# Patient Record
Sex: FEMALE | Race: WHITE | Employment: FULL TIME | URBAN - METROPOLITAN AREA
[De-identification: names, ages, dates, MRNs, and addresses within clinical notes are randomized per-mention and may not be internally consistent; named-entity substitution may affect disease eponyms.]

---

## 2017-01-25 ENCOUNTER — HOSPITAL ENCOUNTER (EMERGENCY)
Age: 57
Discharge: HOME | End: 2017-01-25
Attending: EMERGENCY MEDICINE
Payer: COMMERCIAL

## 2017-01-25 ENCOUNTER — APPOINTMENT (OUTPATIENT)
Dept: CT IMAGING | Age: 57
End: 2017-01-25
Attending: EMERGENCY MEDICINE
Payer: COMMERCIAL

## 2017-01-25 VITALS — DIASTOLIC BLOOD PRESSURE: 74 MMHG | SYSTOLIC BLOOD PRESSURE: 122 MMHG | OXYGEN SATURATION: 100 %

## 2017-01-25 DIAGNOSIS — R10.31 ABDOMINAL PAIN, RIGHT LOWER QUADRANT: Primary | ICD-10-CM

## 2017-01-25 LAB
ALBUMIN SERPL BCP-MCNC: 3.7 G/DL (ref 3.5–5)
ALBUMIN/GLOB SERPL: 1.1 {RATIO} (ref 1.1–2.2)
ALP SERPL-CCNC: 67 U/L (ref 45–117)
ALT SERPL-CCNC: 23 U/L (ref 12–78)
ANION GAP BLD CALC-SCNC: 8 MMOL/L (ref 5–15)
APPEARANCE UR: ABNORMAL
AST SERPL W P-5'-P-CCNC: 21 U/L (ref 15–37)
BACTERIA URNS QL MICRO: NEGATIVE /HPF
BASOPHILS # BLD AUTO: 0 K/UL (ref 0–0.1)
BASOPHILS # BLD: 0 % (ref 0–1)
BILIRUB SERPL-MCNC: 0.6 MG/DL (ref 0.2–1)
BILIRUB UR QL: NEGATIVE
BUN SERPL-MCNC: 16 MG/DL (ref 6–20)
BUN/CREAT SERPL: 30 (ref 12–20)
CALCIUM SERPL-MCNC: 8.9 MG/DL (ref 8.5–10.1)
CHLORIDE SERPL-SCNC: 104 MMOL/L (ref 97–108)
CO2 SERPL-SCNC: 27 MMOL/L (ref 21–32)
COLOR UR: ABNORMAL
CREAT SERPL-MCNC: 0.54 MG/DL (ref 0.55–1.02)
EOSINOPHIL # BLD: 0.2 K/UL (ref 0–0.4)
EOSINOPHIL NFR BLD: 2 % (ref 0–7)
EPITH CASTS URNS QL MICRO: ABNORMAL /LPF
ERYTHROCYTE [DISTWIDTH] IN BLOOD BY AUTOMATED COUNT: 13.5 % (ref 11.5–14.5)
GLOBULIN SER CALC-MCNC: 3.5 G/DL (ref 2–4)
GLUCOSE SERPL-MCNC: 92 MG/DL (ref 65–100)
GLUCOSE UR STRIP.AUTO-MCNC: NEGATIVE MG/DL
HCG UR QL: NEGATIVE
HCT VFR BLD AUTO: 44.8 % (ref 35–47)
HGB BLD-MCNC: 15 G/DL (ref 11.5–16)
HGB UR QL STRIP: NEGATIVE
KETONES UR QL STRIP.AUTO: NEGATIVE MG/DL
LEUKOCYTE ESTERASE UR QL STRIP.AUTO: NEGATIVE
LYMPHOCYTES # BLD AUTO: 15 % (ref 12–49)
LYMPHOCYTES # BLD: 1.4 K/UL (ref 0.8–3.5)
MCH RBC QN AUTO: 33.5 PG (ref 26–34)
MCHC RBC AUTO-ENTMCNC: 33.5 G/DL (ref 30–36.5)
MCV RBC AUTO: 100 FL (ref 80–99)
MONOCYTES # BLD: 0.6 K/UL (ref 0–1)
MONOCYTES NFR BLD AUTO: 6 % (ref 5–13)
MUCOUS THREADS URNS QL MICRO: ABNORMAL /LPF
NEUTS SEG # BLD: 7.3 K/UL (ref 1.8–8)
NEUTS SEG NFR BLD AUTO: 77 % (ref 32–75)
NITRITE UR QL STRIP.AUTO: NEGATIVE
PH UR STRIP: 6 [PH] (ref 5–8)
PLATELET # BLD AUTO: 287 K/UL (ref 150–400)
POTASSIUM SERPL-SCNC: 3.8 MMOL/L (ref 3.5–5.1)
PROT SERPL-MCNC: 7.2 G/DL (ref 6.4–8.2)
PROT UR STRIP-MCNC: NEGATIVE MG/DL
RBC # BLD AUTO: 4.48 M/UL (ref 3.8–5.2)
RBC #/AREA URNS HPF: ABNORMAL /HPF (ref 0–5)
SODIUM SERPL-SCNC: 139 MMOL/L (ref 136–145)
SP GR UR REFRACTOMETRY: 1.03 (ref 1–1.03)
UROBILINOGEN UR QL STRIP.AUTO: 0.2 EU/DL (ref 0.2–1)
WBC # BLD AUTO: 9.5 K/UL (ref 3.6–11)
WBC URNS QL MICRO: ABNORMAL /HPF (ref 0–4)

## 2017-01-25 PROCEDURE — 81025 URINE PREGNANCY TEST: CPT

## 2017-01-25 PROCEDURE — 36415 COLL VENOUS BLD VENIPUNCTURE: CPT | Performed by: EMERGENCY MEDICINE

## 2017-01-25 PROCEDURE — 99284 EMERGENCY DEPT VISIT MOD MDM: CPT

## 2017-01-25 PROCEDURE — 74011250636 HC RX REV CODE- 250/636: Performed by: EMERGENCY MEDICINE

## 2017-01-25 PROCEDURE — 80053 COMPREHEN METABOLIC PANEL: CPT | Performed by: EMERGENCY MEDICINE

## 2017-01-25 PROCEDURE — 85025 COMPLETE CBC W/AUTO DIFF WBC: CPT | Performed by: EMERGENCY MEDICINE

## 2017-01-25 PROCEDURE — 96375 TX/PRO/DX INJ NEW DRUG ADDON: CPT

## 2017-01-25 PROCEDURE — 74176 CT ABD & PELVIS W/O CONTRAST: CPT

## 2017-01-25 PROCEDURE — 96374 THER/PROPH/DIAG INJ IV PUSH: CPT

## 2017-01-25 PROCEDURE — 81001 URINALYSIS AUTO W/SCOPE: CPT | Performed by: EMERGENCY MEDICINE

## 2017-01-25 RX ORDER — KETOROLAC TROMETHAMINE 30 MG/ML
30 INJECTION, SOLUTION INTRAMUSCULAR; INTRAVENOUS
Status: COMPLETED | OUTPATIENT
Start: 2017-01-25 | End: 2017-01-25

## 2017-01-25 RX ORDER — SODIUM CHLORIDE 0.9 % (FLUSH) 0.9 %
5-10 SYRINGE (ML) INJECTION AS NEEDED
Status: DISCONTINUED | OUTPATIENT
Start: 2017-01-25 | End: 2017-01-25 | Stop reason: HOSPADM

## 2017-01-25 RX ORDER — OXYCODONE AND ACETAMINOPHEN 5; 325 MG/1; MG/1
1 TABLET ORAL
Qty: 20 TAB | Refills: 0 | Status: SHIPPED | OUTPATIENT
Start: 2017-01-25 | End: 2017-02-02 | Stop reason: ALTCHOICE

## 2017-01-25 RX ORDER — MORPHINE SULFATE 4 MG/ML
4 INJECTION, SOLUTION INTRAMUSCULAR; INTRAVENOUS
Status: COMPLETED | OUTPATIENT
Start: 2017-01-25 | End: 2017-01-25

## 2017-01-25 RX ORDER — SODIUM CHLORIDE 0.9 % (FLUSH) 0.9 %
5-10 SYRINGE (ML) INJECTION EVERY 8 HOURS
Status: DISCONTINUED | OUTPATIENT
Start: 2017-01-25 | End: 2017-01-25 | Stop reason: HOSPADM

## 2017-01-25 RX ADMIN — SODIUM CHLORIDE 1000 ML: 900 INJECTION, SOLUTION INTRAVENOUS at 10:16

## 2017-01-25 RX ADMIN — MORPHINE SULFATE 4 MG: 4 INJECTION, SOLUTION INTRAMUSCULAR; INTRAVENOUS at 11:20

## 2017-01-25 RX ADMIN — KETOROLAC TROMETHAMINE 30 MG: 30 INJECTION, SOLUTION INTRAMUSCULAR at 10:16

## 2017-01-25 NOTE — ED PROVIDER NOTES
HPI Comments: Miles Farrell is a 64 y.o. female who presents ambulatory to the ED with cc of constant, right lowre abdominal pain that progressively worsened since onset last night. Pt states that last night she experienced acute onset right, lower back pain which she believed was due to a pulled muscle, however, she states pain has since radiated and localized within the right lower abdomen. She reports exacerbation of pain with sitting upright, noting pain is alleviated while laying flat or when standing up straight. She reports use of OTC Advil around 0600 this morning with relief of pain. She endorses normal bowel movements. She reports additional symptom of nausea and lightheadedness secondary to exacerbation of pain. Her PSHx is significant for appendectomy. She specifically denies any dysuria, hematuria, fevers, chills, vomiting, chest pain, shortness of breath, headache, rash, diarrhea, sweating or weight loss. PCP: Charles Juarez MD    There are no other complaints, changes or physical findings at this time. The history is provided by the patient. No  was used. No past medical history on file. Past Surgical History:   Procedure Laterality Date    Hx gyn       dysplasia         Family History:   Problem Relation Age of Onset    Heart Disease Mother        Social History     Social History    Marital status: SINGLE     Spouse name: N/A    Number of children: N/A    Years of education: N/A     Occupational History    Not on file. Social History Main Topics    Smoking status: Current Every Day Smoker     Packs/day: 0.50     Years: 30.00    Smokeless tobacco: Not on file    Alcohol use Yes      Comment: weekends    Drug use: Not on file    Sexual activity: Not on file     Other Topics Concern    Not on file     Social History Narrative         ALLERGIES: Review of patient's allergies indicates no known allergies.     Review of Systems   Constitutional: Negative. Negative for activity change, appetite change, chills, diaphoresis, fatigue, fever and unexpected weight change. HENT: Negative. Negative for congestion, hearing loss, rhinorrhea, sneezing and voice change. Eyes: Negative. Negative for pain and visual disturbance. Respiratory: Negative. Negative for apnea, cough, choking, chest tightness and shortness of breath. Cardiovascular: Negative. Negative for chest pain and palpitations. Gastrointestinal: Positive for abdominal pain and nausea. Negative for abdominal distention, blood in stool, diarrhea and vomiting. Genitourinary: Negative. Negative for difficulty urinating, dysuria, flank pain, frequency, hematuria and urgency. No discharge   Musculoskeletal: Negative. Negative for arthralgias, back pain, myalgias and neck stiffness. Skin: Negative. Negative for color change and rash. Neurological: Positive for light-headedness. Negative for dizziness, seizures, syncope, speech difficulty, weakness, numbness and headaches. Hematological: Negative for adenopathy. Psychiatric/Behavioral: Negative. Negative for agitation, behavioral problems, dysphoric mood and suicidal ideas. The patient is not nervous/anxious. Vitals:    01/25/17 1115 01/25/17 1130 01/25/17 1145 01/25/17 1146   BP: 126/77 121/76 127/77 122/74   SpO2: 99% 100% 100% 100%            Physical Exam   Constitutional: She is oriented to person, place, and time. She appears well-developed and well-nourished. No distress. HENT:   Head: Normocephalic and atraumatic. Mouth/Throat: Oropharynx is clear and moist. No oropharyngeal exudate. Eyes: Conjunctivae and EOM are normal. Pupils are equal, round, and reactive to light. Right eye exhibits no discharge. Left eye exhibits no discharge. Neck: Normal range of motion. Neck supple. Cardiovascular: Normal rate, regular rhythm and intact distal pulses. Exam reveals no gallop and no friction rub.     No murmur heard.  Pulmonary/Chest: Effort normal and breath sounds normal. No respiratory distress. She has no wheezes. She has no rales. She exhibits no tenderness. Abdominal: Soft. Bowel sounds are normal. She exhibits no distension and no mass. There is no tenderness. There is no rebound and no guarding. Musculoskeletal: Normal range of motion. She exhibits no edema. Lymphadenopathy:     She has no cervical adenopathy. Neurological: She is alert and oriented to person, place, and time. No cranial nerve deficit. Coordination normal.   Skin: Skin is warm and dry. No rash noted. No erythema. Psychiatric: She has a normal mood and affect. Nursing note and vitals reviewed. MDM  Number of Diagnoses or Management Options  Abdominal pain, right lower quadrant:   Diagnosis management comments: DDx: Diverticulitis, SBO, kidney stone, UTI       Amount and/or Complexity of Data Reviewed  Clinical lab tests: ordered and reviewed  Tests in the radiology section of CPT®: ordered and reviewed  Review and summarize past medical records: yes  Independent visualization of images, tracings, or specimens: yes    Patient Progress  Patient progress: stable        Procedures      Chief Complaint   Patient presents with    Abdominal Pain       9:41 AM  The patients presenting problems have been discussed, and they are in agreement with the care plan formulated and outlined with them. I have encouraged them to ask questions as they arise throughout their visit.     MEDICATIONS GIVEN:  Medications   ketorolac (TORADOL) injection 30 mg (30 mg IntraVENous Given 1/25/17 1016)   sodium chloride 0.9 % bolus infusion 1,000 mL (0 mL IntraVENous IV Completed 1/25/17 1205)   morphine injection 4 mg (4 mg IntraVENous Given 1/25/17 1120)       LABS REVIEWED:  Recent Results (from the past 24 hour(s))   METABOLIC PANEL, COMPREHENSIVE    Collection Time: 01/30/17 11:35 AM   Result Value Ref Range    Sodium 137 136 - 145 mmol/L    Potassium 4.1 3.5 - 5.1 mmol/L    Chloride 101 97 - 108 mmol/L    CO2 28 21 - 32 mmol/L    Anion gap 8 5 - 15 mmol/L    Glucose 88 65 - 100 mg/dL    BUN 10 6 - 20 MG/DL    Creatinine 0.53 (L) 0.55 - 1.02 MG/DL    BUN/Creatinine ratio 19 12 - 20      GFR est AA >60 >60 ml/min/1.73m2    GFR est non-AA >60 >60 ml/min/1.73m2    Calcium 8.7 8.5 - 10.1 MG/DL    Bilirubin, total 0.4 0.2 - 1.0 MG/DL    ALT 28 12 - 78 U/L    AST 19 15 - 37 U/L    Alk. phosphatase 54 45 - 117 U/L    Protein, total 6.5 6.4 - 8.2 g/dL    Albumin 3.4 (L) 3.5 - 5.0 g/dL    Globulin 3.1 2.0 - 4.0 g/dL    A-G Ratio 1.1 1.1 - 2.2     CBC WITH AUTOMATED DIFF    Collection Time: 01/30/17 11:35 AM   Result Value Ref Range    WBC 6.8 3.6 - 11.0 K/uL    RBC 4.54 3.80 - 5.20 M/uL    HGB 15.2 11.5 - 16.0 g/dL    HCT 44.0 35.0 - 47.0 %    MCV 96.9 80.0 - 99.0 FL    MCH 33.5 26.0 - 34.0 PG    MCHC 34.5 30.0 - 36.5 g/dL    RDW 12.9 11.5 - 14.5 %    PLATELET 471 971 - 867 K/uL    NEUTROPHILS 60 32 - 75 %    LYMPHOCYTES 28 12 - 49 %    MONOCYTES 8 5 - 13 %    EOSINOPHILS 4 0 - 7 %    BASOPHILS 0 0 - 1 %    ABS. NEUTROPHILS 4.1 1.8 - 8.0 K/UL    ABS. LYMPHOCYTES 1.9 0.8 - 3.5 K/UL    ABS. MONOCYTES 0.6 0.0 - 1.0 K/UL    ABS. EOSINOPHILS 0.2 0.0 - 0.4 K/UL    ABS.  BASOPHILS 0.0 0.0 - 0.1 K/UL   URINALYSIS W/ REFLEX CULTURE    Collection Time: 01/30/17 11:35 AM   Result Value Ref Range    Color YELLOW/STRAW      Appearance CLEAR CLEAR      Specific gravity 1.009 1.003 - 1.030      pH (UA) 7.5 5.0 - 8.0      Protein NEGATIVE  NEG mg/dL    Glucose NEGATIVE  NEG mg/dL    Ketone TRACE (A) NEG mg/dL    Bilirubin NEGATIVE  NEG      Blood NEGATIVE  NEG      Urobilinogen 0.2 0.2 - 1.0 EU/dL    Nitrites NEGATIVE  NEG      Leukocyte Esterase NEGATIVE  NEG      WBC 0-4 0 - 4 /hpf    RBC 0-5 0 - 5 /hpf    Epithelial cells FEW FEW /lpf    Bacteria NEGATIVE  NEG /hpf    UA:UC IF INDICATED CULTURE NOT INDICATED BY UA RESULT CNI      Hyaline Cast 0-2 0 - 5 /lpf       VITAL SIGNS:  No data found.      RADIOLOGY RESULTS:  The following have been ordered and reviewed:  CT ABD PELV WO CONT   Final Result   INDICATION: Right lower back pain since last night now radiating to right lower  abdomen     COMPARISON: 4/1/2013     TECHNIQUE:   Thin axial images were obtained through the abdomen and pelvis. Coronal and  sagittal reconstructions were generated. Oral contrast was not administered. CT  dose reduction was achieved through use of a standardized protocol tailored for  this examination and automatic exposure control for dose modulation.      The absence of intravenous contrast material reduces the sensitivity for  evaluation of the solid parenchymal organs of the abdomen.      FINDINGS:   LUNG BASES: Minimal bibasilar atelectasis is noted. INCIDENTALLY IMAGED HEART AND MEDIASTINUM: Unremarkable. LIVER: There is an 8 mm cyst in the tip of the liver. No hepatic mass. GALLBLADDER: Unremarkable. SPLEEN: No mass. PANCREAS: No mass or ductal dilatation. ADRENALS: Unremarkable. KIDNEYS/URETERS: No mass, calculus, or hydronephrosis. STOMACH: Unremarkable. SMALL BOWEL: No dilatation or wall thickening. COLON: No dilatation or wall thickening. APPENDIX: Surgically absent. PERITONEUM: No ascites or pneumoperitoneum. RETROPERITONEUM: No lymphadenopathy or aortic aneurysm. REPRODUCTIVE ORGANS: There is no pelvic mass or lymphadenopathy. URINARY BLADDER: No mass or calculus. BONES: Degenerative changes are seen in the lumbar spine and hip joints  bilaterally. ADDITIONAL COMMENTS: N/A     IMPRESSION  IMPRESSION:  No renal or ureteral stone or evidence of hydronephrosis. No acute abnormality       PROGRESS NOTES:    10:01 AM  I have just reevaluated the patient. I have reviewed Her vital signs and determined there is currently no worsening in their condition or physical exam.  Results have been reviewed with them and their questions have been answered.   We will continue to review further results as they come available. 10:49 AM  The patient states that their symptoms have resolved and they feel much better. There are no other new complaints at this time. Her questions have been answered. We are awaiting final results and those will be reviewed with them when they become available. 10:52 AM  Pt has been re-evaluated. She states that she does not have an OBGYN. Will refer her to one for follow up on an outpatient basis. DIAGNOSIS:    1. Abdominal pain, right lower quadrant        PLAN:  Follow-up Information     Follow up With Details Comments [address] [facility],[address], [address] [facility]  [address] [phone]      [address]  [facility] [address]   [phone]          Discharge Medication List as of 1/25/2017 10:53 AM      CONTINUE these medications which have CHANGED    Details   oxyCODONE-acetaminophen (PERCOCET) 5-325 mg per tablet Take 1 Tab by mouth every four (4) hours as needed for Pain. Max Daily Amount: 6 Tabs., Print, Disp-20 Tab, R-0         CONTINUE these medications which have NOT CHANGED    Details   levothyroxine (LEVOXYL) 112 mcg tablet Take  by mouth Daily (before breakfast). Historical Med      DOXYCYCLINE CALCIUM PO Take  by mouth. Historical Med               ED COURSE: The patients hospital course has been uncomplicated. 11:01 AM  Elvis Garcia's  results have been reviewed with her. She has been counseled regarding her diagnosis. She verbally conveys understanding and agreement of the signs, symptoms, diagnosis, treatment and prognosis and additionally agrees to follow up as recommended with Dr. Chloe Chavarria MD in 24 - 48 hours. She also agrees with the care-plan and conveys that all of her questions have been answered.   I have also put together some discharge instructions for her that include: 1) educational information regarding their diagnosis, 2) how to care for their diagnosis at home, as well a 3) list of reasons why they would want to return to the ED prior to their follow-up appointment, should their condition change. Attestation: This note is prepared by Ayesha Everett, acting as Scribe for Vitryn. Meaghan Reynoso, [facility] Meaghan Reynoso MD: The scribe's documentation has been prepared under my direction and personally reviewed by me in its entirety. I confirm that the note above accurately reflects all work, treatment, procedures, and medical decision making performed by me.

## 2017-01-25 NOTE — ED TRIAGE NOTES
Patient reports right lower back pain that began last night which has now radiated to her right lower abdomen. She took motrin which produced mild relief. Patient declines pain medication at this time. She was ambulatory to restroom with steady gait to provide urine specimen. Daughter present at bedside.

## 2017-01-25 NOTE — DISCHARGE INSTRUCTIONS

## 2017-01-25 NOTE — ED NOTES
The patient was given discharge instructions and questions were answered. Patient is in no apparent distress at time of discharge. Ambulatory with steady gait, accompanied by niece who will drive her home.

## 2017-01-30 ENCOUNTER — HOSPITAL ENCOUNTER (EMERGENCY)
Age: 57
Discharge: HOME | End: 2017-01-30
Attending: EMERGENCY MEDICINE
Payer: COMMERCIAL

## 2017-01-30 ENCOUNTER — APPOINTMENT (OUTPATIENT)
Dept: GENERAL RADIOLOGY | Age: 57
End: 2017-01-30
Attending: EMERGENCY MEDICINE
Payer: COMMERCIAL

## 2017-01-30 VITALS
SYSTOLIC BLOOD PRESSURE: 120 MMHG | BODY MASS INDEX: 24.74 KG/M2 | DIASTOLIC BLOOD PRESSURE: 72 MMHG | WEIGHT: 157.63 LBS | RESPIRATION RATE: 16 BRPM | HEIGHT: 67 IN | OXYGEN SATURATION: 99 % | HEART RATE: 72 BPM | TEMPERATURE: 98.6 F

## 2017-01-30 DIAGNOSIS — K59.00 CONSTIPATION, UNSPECIFIED CONSTIPATION TYPE: Primary | ICD-10-CM

## 2017-01-30 DIAGNOSIS — R10.31 RLQ ABDOMINAL PAIN: ICD-10-CM

## 2017-01-30 LAB
ALBUMIN SERPL BCP-MCNC: 3.4 G/DL (ref 3.5–5)
ALBUMIN/GLOB SERPL: 1.1 {RATIO} (ref 1.1–2.2)
ALP SERPL-CCNC: 54 U/L (ref 45–117)
ALT SERPL-CCNC: 28 U/L (ref 12–78)
ANION GAP BLD CALC-SCNC: 8 MMOL/L (ref 5–15)
APPEARANCE UR: CLEAR
AST SERPL W P-5'-P-CCNC: 19 U/L (ref 15–37)
BACTERIA URNS QL MICRO: NEGATIVE /HPF
BASOPHILS # BLD AUTO: 0 K/UL (ref 0–0.1)
BASOPHILS # BLD: 0 % (ref 0–1)
BILIRUB SERPL-MCNC: 0.4 MG/DL (ref 0.2–1)
BILIRUB UR QL: NEGATIVE
BUN SERPL-MCNC: 10 MG/DL (ref 6–20)
BUN/CREAT SERPL: 19 (ref 12–20)
CALCIUM SERPL-MCNC: 8.7 MG/DL (ref 8.5–10.1)
CHLORIDE SERPL-SCNC: 101 MMOL/L (ref 97–108)
CO2 SERPL-SCNC: 28 MMOL/L (ref 21–32)
COLOR UR: ABNORMAL
CREAT SERPL-MCNC: 0.53 MG/DL (ref 0.55–1.02)
EOSINOPHIL # BLD: 0.2 K/UL (ref 0–0.4)
EOSINOPHIL NFR BLD: 4 % (ref 0–7)
EPITH CASTS URNS QL MICRO: ABNORMAL /LPF
ERYTHROCYTE [DISTWIDTH] IN BLOOD BY AUTOMATED COUNT: 12.9 % (ref 11.5–14.5)
GLOBULIN SER CALC-MCNC: 3.1 G/DL (ref 2–4)
GLUCOSE SERPL-MCNC: 88 MG/DL (ref 65–100)
GLUCOSE UR STRIP.AUTO-MCNC: NEGATIVE MG/DL
HCT VFR BLD AUTO: 44 % (ref 35–47)
HGB BLD-MCNC: 15.2 G/DL (ref 11.5–16)
HGB UR QL STRIP: NEGATIVE
HYALINE CASTS URNS QL MICRO: ABNORMAL /LPF (ref 0–5)
KETONES UR QL STRIP.AUTO: ABNORMAL MG/DL
LEUKOCYTE ESTERASE UR QL STRIP.AUTO: NEGATIVE
LYMPHOCYTES # BLD AUTO: 28 % (ref 12–49)
LYMPHOCYTES # BLD: 1.9 K/UL (ref 0.8–3.5)
MCH RBC QN AUTO: 33.5 PG (ref 26–34)
MCHC RBC AUTO-ENTMCNC: 34.5 G/DL (ref 30–36.5)
MCV RBC AUTO: 96.9 FL (ref 80–99)
MONOCYTES # BLD: 0.6 K/UL (ref 0–1)
MONOCYTES NFR BLD AUTO: 8 % (ref 5–13)
NEUTS SEG # BLD: 4.1 K/UL (ref 1.8–8)
NEUTS SEG NFR BLD AUTO: 60 % (ref 32–75)
NITRITE UR QL STRIP.AUTO: NEGATIVE
PH UR STRIP: 7.5 [PH] (ref 5–8)
PLATELET # BLD AUTO: 274 K/UL (ref 150–400)
POTASSIUM SERPL-SCNC: 4.1 MMOL/L (ref 3.5–5.1)
PROT SERPL-MCNC: 6.5 G/DL (ref 6.4–8.2)
PROT UR STRIP-MCNC: NEGATIVE MG/DL
RBC # BLD AUTO: 4.54 M/UL (ref 3.8–5.2)
RBC #/AREA URNS HPF: ABNORMAL /HPF (ref 0–5)
SODIUM SERPL-SCNC: 137 MMOL/L (ref 136–145)
SP GR UR REFRACTOMETRY: 1.01 (ref 1–1.03)
UA: UC IF INDICATED,UAUC: ABNORMAL
UROBILINOGEN UR QL STRIP.AUTO: 0.2 EU/DL (ref 0.2–1)
WBC # BLD AUTO: 6.8 K/UL (ref 3.6–11)
WBC URNS QL MICRO: ABNORMAL /HPF (ref 0–4)

## 2017-01-30 PROCEDURE — 80053 COMPREHEN METABOLIC PANEL: CPT | Performed by: EMERGENCY MEDICINE

## 2017-01-30 PROCEDURE — 81001 URINALYSIS AUTO W/SCOPE: CPT | Performed by: EMERGENCY MEDICINE

## 2017-01-30 PROCEDURE — 85025 COMPLETE CBC W/AUTO DIFF WBC: CPT | Performed by: EMERGENCY MEDICINE

## 2017-01-30 PROCEDURE — 74022 RADEX COMPL AQT ABD SERIES: CPT

## 2017-01-30 PROCEDURE — 99284 EMERGENCY DEPT VISIT MOD MDM: CPT

## 2017-01-30 PROCEDURE — 36415 COLL VENOUS BLD VENIPUNCTURE: CPT | Performed by: EMERGENCY MEDICINE

## 2017-01-30 PROCEDURE — 74011250637 HC RX REV CODE- 250/637: Performed by: EMERGENCY MEDICINE

## 2017-01-30 RX ORDER — DICYCLOMINE HYDROCHLORIDE 20 MG/1
20 TABLET ORAL
Status: COMPLETED | OUTPATIENT
Start: 2017-01-30 | End: 2017-01-30

## 2017-01-30 RX ORDER — MAGNESIUM CITRATE
296 SOLUTION, ORAL ORAL
Status: COMPLETED | OUTPATIENT
Start: 2017-01-30 | End: 2017-01-30

## 2017-01-30 RX ADMIN — MAGESIUM CITRATE 296 ML: 1.75 LIQUID ORAL at 11:58

## 2017-01-30 RX ADMIN — DICYCLOMINE HYDROCHLORIDE 20 MG: 20 TABLET ORAL at 11:58

## 2017-01-30 NOTE — DISCHARGE INSTRUCTIONS
Abdominal Pain: Care Instructions  Your Care Instructions    Abdominal pain has many possible causes. Some aren't serious and get better on their own in a few days. Others need more testing and treatment. If your pain continues or gets worse, you need to be rechecked and may need more tests to find out what is wrong. You may need surgery to correct the problem. Don't ignore new symptoms, such as fever, nausea and vomiting, urination problems, pain that gets worse, and dizziness. These may be signs of a more serious problem. Your doctor may have recommended a follow-up visit in the next 8 to 12 hours. If you are not getting better, you may need more tests or treatment. The doctor has checked you carefully, but problems can develop later. If you notice any problems or new symptoms, get medical treatment right away. Follow-up care is a key part of your treatment and safety. Be sure to make and go to all appointments, and call your doctor if you are having problems. It's also a good idea to know your test results and keep a list of the medicines you take. How can you care for yourself at home? · Rest until you feel better. · To prevent dehydration, drink plenty of fluids, enough so that your urine is light yellow or clear like water. Choose water and other caffeine-free clear liquids until you feel better. If you have kidney, heart, or liver disease and have to limit fluids, talk with your doctor before you increase the amount of fluids you drink. · If your stomach is upset, eat mild foods, such as rice, dry toast or crackers, bananas, and applesauce. Try eating several small meals instead of two or three large ones. · Wait until 48 hours after all symptoms have gone away before you have spicy foods, alcohol, and drinks that contain caffeine. · Do not eat foods that are high in fat. · Avoid anti-inflammatory medicines such as aspirin, ibuprofen (Advil, Motrin), and naproxen (Aleve).  These can cause stomach upset. Talk to your doctor if you take daily aspirin for another health problem. When should you call for help? Call 911 anytime you think you may need emergency care. For example, call if:  · You passed out (lost consciousness). · You pass maroon or very bloody stools. · You vomit blood or what looks like coffee grounds. · You have new, severe belly pain. Call your doctor now or seek immediate medical care if:  · Your pain gets worse, especially if it becomes focused in one area of your belly. · You have a new or higher fever. · Your stools are black and look like tar, or they have streaks of blood. · You have unexpected vaginal bleeding. · You have symptoms of a urinary tract infection. These may include:  ¨ Pain when you urinate. ¨ Urinating more often than usual.  ¨ Blood in your urine. · You are dizzy or lightheaded, or you feel like you may faint. Watch closely for changes in your health, and be sure to contact your doctor if:  · You are not getting better after 1 day (24 hours). Where can you learn more? Go to http://[facility]ichael.info/. Enter L596 in the search box to learn more about \"Abdominal Pain: Care Instructions. \"  Current as of: May 27, 2016  Content Version: 11.1  © 3717-0077 [employer]. Care instructions adapted under license by [employer] (which disclaims liability or warranty for this information). If you have questions about a medical condition or this instruction, always ask your healthcare professional. [place] [address] any warranty or liability for your use of this information. Constipation: Care Instructions  Your Care Instructions  Constipation means that you have a hard time passing stools (bowel movements). People pass stools from 3 times a day to once every 3 days. What is normal for you may be different. Constipation may occur with pain in the rectum and cramping.  The pain may get worse when you try to pass stools. Sometimes there are small amounts of bright red blood on toilet paper or the surface of stools. This is because of enlarged veins near the rectum (hemorrhoids). A few changes in your diet and lifestyle may help you avoid ongoing constipation. Your doctor may also prescribe medicine to help loosen your stool. Some medicines can cause constipation. These include pain medicines and antidepressants. Tell your doctor about all the medicines you take. Your doctor may want to make a medicine change to ease your symptoms. Follow-up care is a key part of your treatment and safety. Be sure to make and go to all appointments, and call your doctor if you are having problems. It's also a good idea to know your test results and keep a list of the medicines you take. How can you care for yourself at home? · Drink plenty of fluids, enough so that your urine is light yellow or clear like water. If you have kidney, heart, or liver disease and have to limit fluids, talk with your doctor before you increase the amount of fluids you drink. · Include high-fiber foods in your diet each day. These include fruits, vegetables, beans, and whole grains. · Get at least 30 minutes of exercise on most days of the week. Walking is a good choice. You also may want to do other activities, such as running, swimming, cycling, or playing tennis or team sports. · Take a fiber supplement, such as Citrucel or Metamucil, every day. Read and follow all instructions on the label. · Schedule time each day for a bowel movement. A daily routine may help. Take your time having your bowel movement. · Support your feet with a small step stool when you sit on the toilet. This helps flex your hips and places your pelvis in a squatting position. · Your doctor may recommend an over-the-counter laxative to relieve your constipation. Examples are Milk of Magnesia and MiraLax. Read and follow all instructions on the label.  Do not use laxatives on a long-term basis. When should you call for help? Call your doctor now or seek immediate medical care if:  · You have new or worse belly pain. · You have new or worse nausea or vomiting. · You have blood in your stools. Watch closely for changes in your health, and be sure to contact your doctor if:  · Your constipation is getting worse. · You do not get better as expected. Where can you learn more? Go to http://yuni-michael.info/. Enter 21 [id number] in the search box to learn more about \"Constipation: Care Instructions. \"  Current as of: May 27, 2016  Content Version: 11.1  © 3706-5732 [employer]. Care instructions adapted under license by [employer] (which disclaims liability or warranty for this information). If you have questions about a medical condition or this instruction, always ask your healthcare professional. [address] any warranty or liability for your use of this information.

## 2017-01-30 NOTE — LETTER
[address] 
[facility] EMERGENCY DEPT 
[address] [place] [address] [address] [phone] [phone] Work/School Note Date: 1/30/2017 To Whom It May concern: 
 
Denise Moser was seen and treated today in the emergency room by the following provider(s): 
Attending Provider: Jose De Jesus Silva DO. Denise Moser may return to work on Feb 1, 2017.  
 
Sincerely, 
 
 
 
 
Giovanny Pedro RN

## 2017-02-02 ENCOUNTER — OFFICE VISIT (OUTPATIENT)
Dept: INTERNAL MEDICINE CLINIC | Age: 57
End: 2017-02-02

## 2017-02-02 ENCOUNTER — TELEPHONE (OUTPATIENT)
Dept: INTERNAL MEDICINE CLINIC | Age: 57
End: 2017-02-02

## 2017-02-02 VITALS
HEIGHT: 67 IN | TEMPERATURE: 96.3 F | DIASTOLIC BLOOD PRESSURE: 67 MMHG | BODY MASS INDEX: 24.48 KG/M2 | SYSTOLIC BLOOD PRESSURE: 115 MMHG | HEART RATE: 71 BPM | WEIGHT: 156 LBS | RESPIRATION RATE: 17 BRPM | OXYGEN SATURATION: 98 %

## 2017-02-02 DIAGNOSIS — R10.31 RLQ ABDOMINAL PAIN: Primary | ICD-10-CM

## 2017-02-02 DIAGNOSIS — R10.31 RLQ ABDOMINAL PAIN: ICD-10-CM

## 2017-02-02 DIAGNOSIS — K59.00 CONSTIPATION, UNSPECIFIED CONSTIPATION TYPE: ICD-10-CM

## 2017-02-02 DIAGNOSIS — M16.10 HIP ARTHRITIS: Primary | ICD-10-CM

## 2017-02-02 DIAGNOSIS — M16.10 HIP ARTHRITIS: ICD-10-CM

## 2017-02-02 RX ORDER — DOXYCYCLINE HYCLATE 20 MG
20 TABLET ORAL 2 TIMES DAILY
COMMUNITY
End: 2020-06-26

## 2017-02-02 NOTE — LETTER
NOTIFICATION RETURN TO WORK / SCHOOL 
 
2/2/2017 11:17 AM 
 
Ms. Chucky Melchor [facility] [address] To Whom It May Concern: 
 
Chucky Melchor is currently under the care of Beth. She will not return to work/school until after being evaluated by primary care doctor. If there are questions or concerns please have the patient contact our office.  
 
 
 
Sincerely, 
 
 
Marbin Chiang MD

## 2017-02-02 NOTE — MR AVS SNAPSHOT
Visit Information Date & Time Provider Department Dept. Phone Encounter #  
 2/2/2017 10:15 AM Mando Becerra MD [facility] Internal Medicine [phone] [id number] Follow-up Instructions Return in about 1 week (around 2/9/2017). Upcoming Health Maintenance Date Due Hepatitis C Screening 1960 Pneumococcal 19-64 Medium Risk (1 of 1 - PPSV23) 8/8/1979 DTaP/Tdap/Td series (1 - Tdap) 8/8/1981 PAP AKA CERVICAL CYTOLOGY 8/8/1981 FOBT Q 1 YEAR AGE 50-75 8/8/2010 BREAST CANCER SCRN MAMMOGRAM 6/26/2011 INFLUENZA AGE 9 TO ADULT 8/1/2016 Allergies as of 2/2/2017  Review Complete On: 2/2/2017 By: Melvi Thomas LPN No Known Allergies Current Immunizations  Reviewed on 4/1/2013 No immunizations on file. Not reviewed this visit You Were Diagnosed With   
  
 Codes Comments RLQ abdominal pain    -  Primary ICD-10-CM: R10.31 ICD-9-CM: 789.03 Hip arthritis     ICD-10-CM: M19.90 ICD-9-CM: 716.95 Constipation, unspecified constipation type     ICD-10-CM: K59.00 ICD-9-CM: 564.00 Vitals BP Pulse Temp Resp Height(growth percentile) Weight(growth percentile)  
 115/67 (BP 1 Location: Left arm, BP Patient Position: Sitting) 71 96.3 °F (35.7 °C) (Oral) 17 5' 7\" (1.702 m) 156 lb (70.8 kg) SpO2 BMI OB Status Smoking Status 98% 24.43 kg/m2 Postmenopausal Former Smoker Vitals History BMI and BSA Data Body Mass Index Body Surface Area  
 24.43 kg/m 2 1.83 m 2 Preferred Pharmacy Pharmacy Name Phone [address] [place], [address] [facility] [phone] Your Updated Medication List  
  
   
This list is accurate as of: 2/2/17 11:05 AM.  Always use your most recent med list.  
  
  
  
  
 doxycycline 20 mg tablet Commonly known as:  PERIOSTAT Take 20 mg by mouth two (2) times a day. LEVOXYL 112 mcg tablet Generic drug:  levothyroxine Take  by mouth Daily (before breakfast). Follow-up Instructions Return in about 1 week (around 2/9/2017). To-Do List   
 02/02/2017 Imaging:  MRI HIP RT W  WO CONT   
  
 02/02/2017 Imaging:  XR ABD ACUTE W 1 V CHEST Patient Instructions Abdominal Pain: Care Instructions Your Care Instructions Abdominal pain has many possible causes. Some aren't serious and get better on their own in a few days. Others need more testing and treatment. If your pain continues or gets worse, you need to be rechecked and may need more tests to find out what is wrong. You may need surgery to correct the problem. Don't ignore new symptoms, such as fever, nausea and vomiting, urination problems, pain that gets worse, and dizziness. These may be signs of a more serious problem. Your doctor may have recommended a follow-up visit in the next 8 to 12 hours. If you are not getting better, you may need more tests or treatment. The doctor has checked you carefully, but problems can develop later. If you notice any problems or new symptoms, get medical treatment right away. Follow-up care is a key part of your treatment and safety. Be sure to make and go to all appointments, and call your doctor if you are having problems. It's also a good idea to know your test results and keep a list of the medicines you take. How can you care for yourself at home? · Rest until you feel better. · To prevent dehydration, drink plenty of fluids, enough so that your urine is light yellow or clear like water. Choose water and other caffeine-free clear liquids until you feel better. If you have kidney, heart, or liver disease and have to limit fluids, talk with your doctor before you increase the amount of fluids you drink. · If your stomach is upset, eat mild foods, such as rice, dry toast or crackers, bananas, and applesauce. Try eating several small meals instead of two or three large ones. · Wait until 48 hours after all symptoms have gone away before you have spicy foods, alcohol, and drinks that contain caffeine. · Do not eat foods that are high in fat. · Avoid anti-inflammatory medicines such as aspirin, ibuprofen (Advil, Motrin), and naproxen (Aleve). These can cause stomach upset. Talk to your doctor if you take daily aspirin for another health problem. When should you call for help? Call 911 anytime you think you may need emergency care. For example, call if: 
· You passed out (lost consciousness). · You pass maroon or very bloody stools. · You vomit blood or what looks like coffee grounds. · You have new, severe belly pain. Call your doctor now or seek immediate medical care if: 
· Your pain gets worse, especially if it becomes focused in one area of your belly. · You have a new or higher fever. · Your stools are black and look like tar, or they have streaks of blood. · You have unexpected vaginal bleeding. · You have symptoms of a urinary tract infection. These may include: 
¨ Pain when you urinate. ¨ Urinating more often than usual. 
¨ Blood in your urine. · You are dizzy or lightheaded, or you feel like you may faint. Watch closely for changes in your health, and be sure to contact your doctor if: 
· You are not getting better after 1 day (24 hours). Where can you learn more? Go to http://yuni-michael.info/. Enter U041 in the search box to learn more about \"Abdominal Pain: Care Instructions. \" Current as of: May 27, 2016 Content Version: 11.1 © 5512-0865 [employer]ova. Care instructions adapted under license by [employer] (which disclaims liability or warranty for this information). If you have questions about a medical condition or this instruction, always ask your healthcare professional. [place] [address] any warranty or liability for your use of this information. Introducing [facility] SERVICES! Flory Townsend introduces Optaros patient portal. Now you can access parts of your medical record, email your doctor's office, and request medication refills online. 1. In your internet browser, go to [email or web address] 2. Click on the First Time User? Click Here link in the Sign In box. You will see the New Member Sign Up page. 3. Enter your Optaros Access Code exactly as it appears below. You will not need to use this code after youve completed the sign-up process. If you do not sign up before the expiration date, you must request a new code. · Optaros Access Code: [email or web address] Expires: 4/25/2017  9:27 AM 
 
4. Enter the last four digits of your Social Security Number (xxxx) and Date of Birth (mm/dd/yyyy) as indicated and click Submit. You will be taken to the next sign-up page. 5. Create a Optaros ID. This will be your Optaros login ID and cannot be changed, so think of one that is secure and easy to remember. 6. Create a Optaros password. You can change your password at any time. 7. Enter your Password Reset Question and Answer. This can be used at a later time if you forget your password. 8. Enter your e-mail address. You will receive e-mail notification when new information is available in [address] 9. Click Sign Up. You can now view and download portions of your medical record. 10. Click the Download Summary menu link to download a portable copy of your medical information. If you have questions, please visit the Frequently Asked Questions section of the Optaros website. Remember, Optaros is NOT to be used for urgent needs. For medical emergencies, dial 911. Now available from your iPhone and Android! Please provide this summary of care documentation to your next provider. Your primary care clinician is listed as Wilma Thomas. If you have any questions after today's visit, please call [phone].

## 2017-02-02 NOTE — TELEPHONE ENCOUNTER
[employer] of care called and stated that Pts insurance is not covering MRI with contrast, needs an order put it for without contrast.

## 2017-02-02 NOTE — TELEPHONE ENCOUNTER
Order placed in [facility]. Sydnie Moeller with central scheduling states that pt is set to go and authorization approved. Pt notified and verbalized her understanding.

## 2017-02-02 NOTE — TELEPHONE ENCOUNTER
Staff Message: Patient's niece, Gela Crenshaw, would like office to know that they had xrays done today at [facility] on [address].  If there are questions, please call her at [phone].

## 2017-02-02 NOTE — PROGRESS NOTES
Subjective:     Chief Complaint   Patient presents with    Groin Pain     9-10 days, constant        She  is a 64y.o. year old female who presents as a new patient to establish and  for evaluation of her ongoing groin pain for the past 9-10 days. Past medical history is significant for hypothyroidism. Patient and her niece is here today. Reports that she was in and out of the ER twice already for the the constant sharp pain in her right groin area. Pain does not radiate. Pain staretd gradually and now is constant. CT scan was normal except hip arthritis. She was sent home with percocet but seems like pain never went away so she went to the ER again where she was found have constipation. She was treated with enema and stool softer and sent home. Even though she has been having normal BM but still she continue to have pain. Pain is mostly when she sit on it or walk. No UTI symptoms. Urine was clear recently. Pertinent items are noted in HPI. Objective:     Vitals:    02/02/17 1035   BP: 115/67   Pulse: 71   Resp: 17   Temp: 96.3 °F (35.7 °C)   TempSrc: Oral   SpO2: 98%   Weight: 156 lb (70.8 kg)   Height: 5' 7\" (1.702 m)       Physical Examination: General appearance - alert, well appearing, in distress due to pain, oriented to person, place, and time and normal appearing weight  Mental status - alert, oriented to person, place, and time, normal mood, behavior, speech, dress, motor activity, and thought processes  Chest - clear to auscultation, no wheezes, rales or rhonchi, symmetric air entry  Heart - normal rate, regular rhythm, normal S1, S2, no murmurs, rubs, clicks or gallops  Abdomen - soft,  nondistended, no masses or organomegaly. TTP over RLQ near the inguinal area. No hernia or lymph node enragement.    Back exam - full range of motion, no tenderness, palpable spasm or pain on motion  Neurological - alert, oriented, normal speech, no focal findings or movement disorder noted  Flexion of right hip was painful. No Known Allergies   Social History     Social History    Marital status: SINGLE     Spouse name: N/A    Number of children: N/A    Years of education: N/A     Social History Main Topics    Smoking status: Former Smoker     Packs/day: 0.50     Years: 30.00     Quit date: 1/21/2016    Smokeless tobacco: Never Used    Alcohol use 0.6 oz/week     1 Glasses of wine per week      Comment: weekends    Drug use: No    Sexual activity: Yes     Other Topics Concern    None     Social History Narrative      Family History   Problem Relation Age of Onset    Heart Disease Mother     Hypertension Mother     Diabetes Brother     Hypertension Brother       Past Surgical History   Procedure Laterality Date    Hx gyn       dysplasia    Hx appendectomy        Past Medical History   Diagnosis Date    Thyroid disease       Current Outpatient Prescriptions   Medication Sig Dispense Refill    doxycycline (PERIOSTAT) 20 mg tablet Take 20 mg by mouth two (2) times a day.  levothyroxine (LEVOXYL) 112 mcg tablet Take  by mouth Daily (before breakfast). Assessment/ Plan:   Allie Escoto was seen today for groin pain. Diagnoses and all orders for this visit:    RLQ abdominal pain  -       -     XR ABD ACUTE W 1 V CHEST; Future    -     MRI HIP RT W  WO CONT; Future    Hip arthritis:  -     MRI HIP RT W  WO CONT; Future    Constipation, unspecified constipation type  -     XR ABD ACUTE W 1 V CHEST; Future        -     Need to r/o any underlying bone pathology such as Avascular necrosis, soft tissue D/S    Medication risks/benefits/costs/interactions/alternatives discussed with patient. Advised patient to call back or return to office if symptoms worsen/change/persist. If patient cannot reach us or should anything more severe/urgent arise he/she should proceed directly to the nearest emergency department. Discussed expected course/resolution/complications of diagnosis in detail with patient.   Patient given a written after visit summary which includes her diagnoses, current medications and vitals. Patient expressed understanding with the diagnosis and plan. Follow-up Disposition:  Return in about 1 week (around 2/9/2017).

## 2017-02-02 NOTE — PATIENT INSTRUCTIONS

## 2017-02-03 ENCOUNTER — TELEPHONE (OUTPATIENT)
Dept: INTERNAL MEDICINE CLINIC | Age: 57
End: 2017-02-03

## 2017-02-03 ENCOUNTER — HOSPITAL ENCOUNTER (OUTPATIENT)
Dept: MRI IMAGING | Age: 57
Discharge: HOME | End: 2017-02-03
Attending: FAMILY MEDICINE
Payer: COMMERCIAL

## 2017-02-03 DIAGNOSIS — R10.31 RLQ ABDOMINAL PAIN: ICD-10-CM

## 2017-02-03 DIAGNOSIS — M16.10 HIP ARTHRITIS: ICD-10-CM

## 2017-02-03 PROCEDURE — 73721 MRI JNT OF LWR EXTRE W/O DYE: CPT

## 2017-02-03 NOTE — TELEPHONE ENCOUNTER
----- Message from Omar Huizar MD sent at 2/3/2017 11:57 AM EST -----  I did no receive her Xray result yet. MRI showed arthritis in hip joint otherwise no significant finding.

## 2017-02-03 NOTE — PROGRESS NOTES
I did no receive her Xray result yet. MRI showed arthritis in hip joint otherwise no significant finding.

## 2017-02-03 NOTE — TELEPHONE ENCOUNTER
Informed pt that MRI showed arthritis, but no other abnormalities. Pt verbalized her understanding. Went to [facility] for xray. States they told them to send report to . Informed pt that we have not yet received it.

## 2017-02-06 ENCOUNTER — TELEPHONE (OUTPATIENT)
Dept: INTERNAL MEDICINE CLINIC | Age: 57
End: 2017-02-06

## 2017-02-06 ENCOUNTER — OFFICE VISIT (OUTPATIENT)
Dept: INTERNAL MEDICINE CLINIC | Age: 57
End: 2017-02-06

## 2017-02-06 DIAGNOSIS — R14.1 ABDOMINAL GAS PAIN: ICD-10-CM

## 2017-02-06 DIAGNOSIS — K59.00 CONSTIPATION, UNSPECIFIED CONSTIPATION TYPE: Primary | ICD-10-CM

## 2017-02-06 RX ORDER — SIMETHICONE 80 MG
80 TABLET,CHEWABLE ORAL
Qty: 30 TAB | Refills: 0 | Status: SHIPPED | OUTPATIENT
Start: 2017-02-06 | End: 2017-09-25 | Stop reason: ALTCHOICE

## 2017-02-06 RX ORDER — AMOXICILLIN 250 MG
2 CAPSULE ORAL DAILY
Qty: 30 TAB | Refills: 0 | Status: SHIPPED | OUTPATIENT
Start: 2017-02-06 | End: 2017-09-25 | Stop reason: ALTCHOICE

## 2017-02-06 NOTE — TELEPHONE ENCOUNTER
Staff Message: BEST Pt's Niece [phone]   Pt's niece advised she is on pt's privacy statement and left several messages, yesterday, requesting a call back.  Pt is scheduled on Monday, 02/06/17 to discuss pt's MRI.  Pt's x-rays of stomach, pelvic area and chest were done yesterday at [facility], [place]; pt was experiencing pain lower (R) side of stomach.  [facility] advised pt, the disc was not necessary and declined giving her the disc.   She was advised to give Dr. Nico Jasso name which is all that was needed for pt's PCP to obtain the x-rays.

## 2017-02-06 NOTE — MR AVS SNAPSHOT
Visit Information Date & Time Provider Department Dept. Phone Encounter #  
 2/6/2017  1:30 PM Betty Rodriguez MD [facility] Internal Medicine [phone] [id number] Follow-up Instructions Return if symptoms worsen or fail to improve. Upcoming Health Maintenance Date Due Hepatitis C Screening 1960 DTaP/Tdap/Td series (1 - Tdap) 8/8/1981 PAP AKA CERVICAL CYTOLOGY 8/8/1981 FOBT Q 1 YEAR AGE 50-75 8/8/2010 BREAST CANCER SCRN MAMMOGRAM 6/26/2011 INFLUENZA AGE 9 TO ADULT 8/1/2016 Allergies as of 2/6/2017  Review Complete On: 2/6/2017 By: Betty Rodriguez MD  
 No Known Allergies Current Immunizations  Reviewed on 4/1/2013 No immunizations on file. Not reviewed this visit You Were Diagnosed With   
  
 Codes Comments Constipation, unspecified constipation type    -  Primary ICD-10-CM: K59.00 ICD-9-CM: 564.00 Abdominal gas pain     ICD-10-CM: R14.1 ICD-9-CM: 641. 3 Vitals BP Height(growth percentile) OB Status Smoking Status (!) 80/60 (P) 5' 7\" (1.702 m) Postmenopausal Former Smoker Vitals History Preferred Pharmacy Pharmacy Name Phone [address] [place] [address], [facility] [phone] Your Updated Medication List  
  
   
This list is accurate as of: 2/6/17  3:09 PM.  Always use your most recent med list.  
  
  
  
  
 doxycycline 20 mg tablet Commonly known as:  PERIOSTAT Take 20 mg by mouth two (2) times a day. LEVOXYL 112 mcg tablet Generic drug:  levothyroxine Take  by mouth Daily (before breakfast). senna-docusate 8.6-50 mg per tablet Commonly known as:  Raiza Varinder Take 2 Tabs by mouth daily. Indications: Constipation  
  
 simethicone 80 mg chewable tablet Commonly known as:  Cates Mower Take 1 Tab by mouth every six (6) hours as needed for Flatulence. Indications: FLATULENCE Prescriptions Sent to Pharmacy Refills senna-docusate (PERICOLACE) 8.6-50 mg per tablet 0 Sig: Take 2 Tabs by mouth daily. Indications: Constipation Class: Normal  
 Pharmacy: [place] [address] [address] [place], [place] [address], [address] [facility] #: [phone] Route: Oral  
 simethicone (MYLICON) 80 mg chewable tablet 0 Sig: Take 1 Tab by mouth every six (6) hours as needed for Flatulence. Indications: FLATULENCE Class: Normal  
 [address] [facility] Ph #: [phone] Route: Oral  
  
Follow-up Instructions Return if symptoms worsen or fail to improve. Patient Instructions Gas and Bloating: Care Instructions Your Care Instructions Gas and bloating can be uncomfortable and embarrassing problems. All people pass gas, but some people produce more gas than others, sometimes enough to cause distress. It is normal to pass gas from 6 to 20 times per day. Excess gas usually is not caused by a serious health problem. Gas and bloating usually are caused by something you eat or drink, including some food supplements and medicines. Gas and bloating are usually harmless and go away without treatment. However, changing your diet can help end the problem. Some over-the-counter medicines can help prevent gas and relieve bloating. Follow-up care is a key part of your treatment and safety. Be sure to make and go to all appointments, and call your doctor if you are having problems. Its also a good idea to know your test results and keep a list of the medicines you take. How can you care for yourself at home? · Keep a food diary if you think a food gives you gas. Write down what you eat or drink. Also record when you get gas. If you notice that a food seems to cause your gas each time, avoid it and see if the gas goes away. Examples of foods that cause gas include: ¨ Fried and fatty foods. ¨ Beans.  
¨ Vegetables such as artichokes, asparagus, broccoli, brussels sprouts, cabbage, cauliflower, cucumbers, green peppers, onions, peas, radishes, and raw potatoes. ¨ Fruits such as apricots, bananas, melons, peaches, pears, prunes, and raw apples. ¨ Wheat and wheat bran. · Soak dry beans in water overnight, then dump the water and cook the soaked beans in new water. This can help prevent gas and bloating. · If you have problems with lactose, avoid dairy products such as milk and cheese. · Try not to swallow air. Do not drink through a straw, gulp your food, or chew gum. · Take an over-the-counter medicine. Read and follow all instructions on the label. ¨ Food enzymes, such as Beano, can be added to gas-producing foods to prevent gas. ¨ Antacids, such as Maalox Anti-Gas and Mylanta Gas, can relieve bloating by making you burp. ¨ Activated charcoal tablets, such as CharcoCaps, may decrease odor from gas you pass. ¨ If you have problems with lactose, you can take medicines such as Dairy Ease and Lactaid with dairy products to prevent gas and bloating. · Get some exercise regularly. When should you call for help? Call 911 anytime you think you may need emergency care. For example, call if: 
· You have gas and signs of a heart attack, such as: ¨ Chest pain or pressure. ¨ Sweating. ¨ Shortness of breath. ¨ Nausea or vomiting. ¨ Pain that spreads from the chest to the neck, jaw, or one or both shoulders or arms. ¨ Dizziness or lightheadedness. ¨ A fast or uneven pulse. After calling 911, chew 1 adult-strength aspirin. Wait for an ambulance. Do not try to drive yourself. Call your doctor now or seek immediate medical care if: 
· You have severe belly pain. · You have blood in your stool. Watch closely for changes in your health, and be sure to contact your doctor if: 
· You have blood or pus in your urine. · Your urine is cloudy or smells bad. · You are burping and have trouble swallowing. · You feel bloated and have swelling in your belly. · You do not get better as expected. Where can you learn more? Go to http://yuni-michael.info/. Enter X876 in the search box to learn more about \"Gas and Bloating: Care Instructions. \" Current as of: May 27, 2016 Content Version: 11.1 © 5214-6296 [employer]. Care instructions adapted under license by [employer] (which disclaims liability or warranty for this information). If you have questions about a medical condition or this instruction, always ask your healthcare professional. [address] any warranty or liability for your use of this information. Introducing \[facility]\"" & HEALTH SERVICES! Rene Hernandez introduces SpeedTax patient portal. Now you can access parts of your medical record, email your doctor's office, and request medication refills online. 1. In your internet browser, go to [email or web address] 2. Click on the First Time User? Click Here link in the Sign In box. You will see the New Member Sign Up page. 3. Enter your SpeedTax Access Code exactly as it appears below. You will not need to use this code after youve completed the sign-up process. If you do not sign up before the expiration date, you must request a new code. · SpeedTax Access Code: [email or web address] Expires: 4/25/2017  9:27 AM 
 
4. Enter the last four digits of your Social Security Number (xxxx) and Date of Birth (mm/dd/yyyy) as indicated and click Submit. You will be taken to the next sign-up page. 5. Create a Create! Art Collectivet ID. This will be your SpeedTax login ID and cannot be changed, so think of one that is secure and easy to remember. 6. Create a SpeedTax password. You can change your password at any time. 7. Enter your Password Reset Question and Answer. This can be used at a later time if you forget your password. 8. Enter your e-mail address. You will receive e-mail notification when new information is available in [address] 9. Click Sign Up. You can now view and download portions of your medical record. 10. Click the Download Summary menu link to download a portable copy of your medical information. If you have questions, please visit the Frequently Asked Questions section of the KXEN website. Remember, KXEN is NOT to be used for urgent needs. For medical emergencies, dial 911. Now available from your iPhone and Android! Please provide this summary of care documentation to your next provider. Your primary care clinician is listed as Deya Gonzales. If you have any questions after today's visit, please call [phone].

## 2017-02-06 NOTE — PATIENT INSTRUCTIONS
Gas and Bloating: Care Instructions  Your Care Instructions  Gas and bloating can be uncomfortable and embarrassing problems. All people pass gas, but some people produce more gas than others, sometimes enough to cause distress. It is normal to pass gas from 6 to 20 times per day. Excess gas usually is not caused by a serious health problem. Gas and bloating usually are caused by something you eat or drink, including some food supplements and medicines. Gas and bloating are usually harmless and go away without treatment. However, changing your diet can help end the problem. Some over-the-counter medicines can help prevent gas and relieve bloating. Follow-up care is a key part of your treatment and safety. Be sure to make and go to all appointments, and call your doctor if you are having problems. Its also a good idea to know your test results and keep a list of the medicines you take. How can you care for yourself at home? · Keep a food diary if you think a food gives you gas. Write down what you eat or drink. Also record when you get gas. If you notice that a food seems to cause your gas each time, avoid it and see if the gas goes away. Examples of foods that cause gas include:  ¨ Fried and fatty foods. ¨ Beans. ¨ Vegetables such as artichokes, asparagus, broccoli, brussels sprouts, cabbage, cauliflower, cucumbers, green peppers, onions, peas, radishes, and raw potatoes. ¨ Fruits such as apricots, bananas, melons, peaches, pears, prunes, and raw apples. ¨ Wheat and wheat bran. · Soak dry beans in water overnight, then dump the water and cook the soaked beans in new water. This can help prevent gas and bloating. · If you have problems with lactose, avoid dairy products such as milk and cheese. · Try not to swallow air. Do not drink through a straw, gulp your food, or chew gum. · Take an over-the-counter medicine. Read and follow all instructions on the label.   ¨ Food enzymes, such as Beano, can be added to gas-producing foods to prevent gas. ¨ Antacids, such as Maalox Anti-Gas and Mylanta Gas, can relieve bloating by making you burp. ¨ Activated charcoal tablets, such as CharcoCaps, may decrease odor from gas you pass. ¨ If you have problems with lactose, you can take medicines such as Dairy Ease and Lactaid with dairy products to prevent gas and bloating. · Get some exercise regularly. When should you call for help? Call 911 anytime you think you may need emergency care. For example, call if:  · You have gas and signs of a heart attack, such as:  ¨ Chest pain or pressure. ¨ Sweating. ¨ Shortness of breath. ¨ Nausea or vomiting. ¨ Pain that spreads from the chest to the neck, jaw, or one or both shoulders or arms. ¨ Dizziness or lightheadedness. ¨ A fast or uneven pulse. After calling 911, chew 1 adult-strength aspirin. Wait for an ambulance. Do not try to drive yourself. Call your doctor now or seek immediate medical care if:  · You have severe belly pain. · You have blood in your stool. Watch closely for changes in your health, and be sure to contact your doctor if:  · You have blood or pus in your urine. · Your urine is cloudy or smells bad. · You are burping and have trouble swallowing. · You feel bloated and have swelling in your belly. · You do not get better as expected. Where can you learn more? Go to http://yuni-michael.info/. Enter M317 in the search box to learn more about \"Gas and Bloating: Care Instructions. \"  Current as of: May 27, 2016  Content Version: 11.1  © 3316-1632 [employer]. Care instructions adapted under license by [employer] (which disclaims liability or warranty for this information). If you have questions about a medical condition or this instruction, always ask your healthcare professional. [place] [address] any warranty or liability for your use of this information.

## 2017-02-07 VITALS
DIASTOLIC BLOOD PRESSURE: 60 MMHG | BODY MASS INDEX: 24.43 KG/M2 | HEART RATE: 86 BPM | TEMPERATURE: 96 F | RESPIRATION RATE: 17 BRPM | SYSTOLIC BLOOD PRESSURE: 80 MMHG | WEIGHT: 156 LBS | OXYGEN SATURATION: 100 %

## 2017-02-07 NOTE — PROGRESS NOTES
Subjective:     Chief Complaint   Patient presents with    Abdominal Pain        She  is a 64y.o. year old female who presents with her niece for evaluation of right groin pain for the past two weeks. She had a CT and MRI done, MRI showed DDD of hip with tendinopathy  of IT band band. Her Xray and showed non obstructive gas pattern. She reports that she feels slight better today but still having sharp pain in right groin area. Sitting hurts. In last three days she had one small BM today. Reports that she has been passing lot of gas and burp. No N/V. She is not taking any stool softner today. See last note for more info. She denies any chest pain, palpitation, soa. Pertinent items are noted in HPI. Objective:     Vitals:    02/06/17 1407   BP: (!) 80/60   Height: (P) 5' 7\" (1.702 m)       Physical Examination: General appearance - alert, well appearing, and in no distress, oriented to person, place, and time and normal appearing weight  Mental status - alert, oriented to person, place, and time  Abdomen - tenderness noted in RLQ near inguinal area. No rebound. Soft, normal BS.     No Known Allergies   Social History     Social History    Marital status: SINGLE     Spouse name: N/A    Number of children: N/A    Years of education: N/A     Social History Main Topics    Smoking status: Former Smoker     Packs/day: 0.50     Years: 30.00     Quit date: 1/21/2016    Smokeless tobacco: Never Used    Alcohol use 0.6 oz/week     1 Glasses of wine per week      Comment: weekends    Drug use: No    Sexual activity: Yes     Other Topics Concern    None     Social History Narrative      Family History   Problem Relation Age of Onset    Heart Disease Mother     Hypertension Mother     Diabetes Brother     Hypertension Brother       Past Surgical History   Procedure Laterality Date    Hx gyn       dysplasia    Hx appendectomy        Past Medical History   Diagnosis Date    Thyroid disease       Current Outpatient Prescriptions   Medication Sig Dispense Refill    senna-docusate (PERICOLACE) 8.6-50 mg per tablet Take 2 Tabs by mouth daily. Indications: Constipation 30 Tab 0    simethicone (MYLICON) 80 mg chewable tablet Take 1 Tab by mouth every six (6) hours as needed for Flatulence. Indications: FLATULENCE 30 Tab 0    doxycycline (PERIOSTAT) 20 mg tablet Take 20 mg by mouth two (2) times a day.  levothyroxine (LEVOXYL) 112 mcg tablet Take  by mouth Daily (before breakfast). Assessment/ Plan:   Kathy Molina was seen today for abdominal pain. Diagnoses and all orders for this visit:    Constipation, unspecified constipation type  -     senna-docusate (PERICOLACE) 8.6-50 mg per tablet; Take 2 Tabs by mouth daily. Indications: Constipation  -     simethicone (MYLICON) 80 mg chewable tablet; Take 1 Tab by mouth every six (6) hours as needed for Flatulence. Indications: FLATULENCE    Abdominal gas pain  -     simethicone (MYLICON) 80 mg chewable tablet; Take 1 Tab by mouth every six (6) hours as needed for Flatulence. Indications: FLATULENCE       Advised that if pain does not get better in next 48 hours call me or make an appointment. Got to ER if any worsening symptoms. If BM is back to normal but continue to have pain will consider NSAID/medrol pack to treat IT band tendinopathy. Will consider GI eval.     Medication risks/benefits/costs/interactions/alternatives discussed with patient. Advised patient to call back or return to office if symptoms worsen/change/persist. If patient cannot reach us or should anything more severe/urgent arise he/she should proceed directly to the nearest emergency department. Discussed expected course/resolution/complications of diagnosis in detail with patient. Patient given a written after visit summary which includes her diagnoses, current medications and vitals. Patient expressed understanding with the diagnosis and plan.        Follow-up Disposition:  Return if symptoms worsen or fail to improve.

## 2017-02-09 ENCOUNTER — OFFICE VISIT (OUTPATIENT)
Dept: INTERNAL MEDICINE CLINIC | Age: 57
End: 2017-02-09

## 2017-02-09 VITALS
WEIGHT: 153.6 LBS | BODY MASS INDEX: 24.11 KG/M2 | DIASTOLIC BLOOD PRESSURE: 49 MMHG | HEART RATE: 75 BPM | OXYGEN SATURATION: 99 % | RESPIRATION RATE: 17 BRPM | SYSTOLIC BLOOD PRESSURE: 66 MMHG | HEIGHT: 67 IN | TEMPERATURE: 96.6 F

## 2017-02-09 DIAGNOSIS — M76.31 ILIOTIBIAL BAND TENDINITIS OF RIGHT SIDE: ICD-10-CM

## 2017-02-09 DIAGNOSIS — M70.61 TROCHANTERIC BURSITIS OF RIGHT HIP: Primary | ICD-10-CM

## 2017-02-09 RX ORDER — METHYLPREDNISOLONE 4 MG/1
TABLET ORAL
Qty: 1 DOSE PACK | Refills: 0 | Status: SHIPPED | OUTPATIENT
Start: 2017-02-09 | End: 2017-09-25 | Stop reason: ALTCHOICE

## 2017-02-09 RX ORDER — IBUPROFEN 800 MG/1
800 TABLET ORAL
Qty: 30 TAB | Refills: 0 | Status: SHIPPED | OUTPATIENT
Start: 2017-02-09 | End: 2017-09-25 | Stop reason: DRUGHIGH

## 2017-02-09 RX ORDER — TRIAMCINOLONE ACETONIDE 40 MG/ML
40 INJECTION, SUSPENSION INTRA-ARTICULAR; INTRAMUSCULAR ONCE
Qty: 1 ML | Refills: 0
Start: 2017-02-09 | End: 2017-02-09

## 2017-02-09 NOTE — PROGRESS NOTES
Subjective:     Chief Complaint   Patient presents with    Abdominal Pain        She  is a 64y.o. year old female who presents with continue to have pain in her right groin area when she sits or move to certain movement. MRI, CT scan was negative for any major issues other than DDD and IT band tendinopathy. She sates that her BM is much regulated now after the senna treatment, she is not gassy anymore. Pain is somewhat better compare to last visit. Pertinent items are noted in HPI. Objective:     Vitals:    02/09/17 1013   BP: (!) 66/49   Pulse: 75   Resp: 17   Temp: 96.6 °F (35.9 °C)   TempSrc: Oral   SpO2: 99%   Weight: 153 lb 9.6 oz (69.7 kg)   Height: 5' 7\" (1.702 m)       Physical Examination: General appearance - alert, well appearing, and in no distress, oriented to person, place, and time and normal appearing weight  Mental status - alert, oriented to person, place, and time  Back exam - full range of motion, no tenderness, palpable spasm or pain on motion, sacroiliac joints and sciatic notches nontender, positive straight-leg raise in right, normal reflexes and strength bilateral lower extremities, sensory exam intact bilateral lower extremities. Very tender to palpiation in right trochanteric area. Neurological - alert, oriented, normal speech, no focal findings or movement disorder noted    No rash noted.      No Known Allergies   Social History     Social History    Marital status: SINGLE     Spouse name: N/A    Number of children: N/A    Years of education: N/A     Social History Main Topics    Smoking status: Former Smoker     Packs/day: 0.50     Years: 30.00     Quit date: 1/21/2016    Smokeless tobacco: Never Used    Alcohol use 0.6 oz/week     1 Glasses of wine per week      Comment: weekends    Drug use: No    Sexual activity: Yes     Other Topics Concern    None     Social History Narrative      Family History   Problem Relation Age of Onset    Heart Disease Mother    Juan Hunter Hypertension Mother     Diabetes Brother     Hypertension Brother       Past Surgical History   Procedure Laterality Date    Hx gyn       dysplasia    Hx appendectomy        Past Medical History   Diagnosis Date    Thyroid disease       Current Outpatient Prescriptions   Medication Sig Dispense Refill    methylPREDNISolone (MEDROL DOSEPACK) 4 mg tablet Follow pack instruction. 1 Dose Pack 0    ibuprofen (MOTRIN) 800 mg tablet Take 1 Tab by mouth every eight (8) hours as needed for Pain. 30 Tab 0    senna-docusate (PERICOLACE) 8.6-50 mg per tablet Take 2 Tabs by mouth daily. Indications: Constipation 30 Tab 0    doxycycline (PERIOSTAT) 20 mg tablet Take 20 mg by mouth two (2) times a day.  levothyroxine (LEVOXYL) 112 mcg tablet Take  by mouth Daily (before breakfast).  simethicone (MYLICON) 80 mg chewable tablet Take 1 Tab by mouth every six (6) hours as needed for Flatulence. Indications: FLATULENCE 30 Tab 0        Assessment/ Plan:   Ruby Nassar was seen today for abdominal pain. Diagnoses and all orders for this visit:    Trochanteric bursitis of right hip  -     methylPREDNISolone (MEDROL DOSEPACK) 4 mg tablet; Follow pack instruction. -     ibuprofen (MOTRIN) 800 mg tablet; Take 1 Tab by mouth every eight (8) hours as needed for Pain.  -     TRIAMCINOLONE ACETONIDE INJ  -     triamcinolone acetonide (KENALOG) 40 mg/mL injection; 1 mL by IntraMUSCular route once for 1 dose. Iliotibial band tendinitis of right side  -    Start tomorrow- methylPREDNISolone (MEDROL DOSEPACK) 4 mg tablet; Follow pack instruction. -     ibuprofen (MOTRIN) 800 mg tablet; Take 1 Tab by mouth every eight (8) hours as needed for Pain.  -     TRIAMCINOLONE ACETONIDE INJ  -     triamcinolone acetonide (KENALOG) 40 mg/mL injection; 1 mL by IntraMUSCular route once for 1 dose. -     Exercise instructed.     1 ml of kenalog with 4 ml lidocaine with epi was injected intramuscularly in her highest pain point in right trochanteric area. Patient tolerated well. Patient felt less pain after the injection. Medication risks/benefits/costs/interactions/alternatives discussed with patient. Advised patient to call back or return to office if symptoms worsen/change/persist. If patient cannot reach us or should anything more severe/urgent arise he/she should proceed directly to the nearest emergency department. Discussed expected course/resolution/complications of diagnosis in detail with patient. Patient given a written after visit summary which includes her diagnoses, current medications and vitals. Patient expressed understanding with the diagnosis and plan. Follow-up Disposition:  Return if symptoms worsen or fail to improve.

## 2017-02-09 NOTE — PATIENT INSTRUCTIONS
Iliotibial Band Syndrome: Exercises  Your Care Instructions  Here are some examples of typical rehabilitation exercises for your condition. Start each exercise slowly. Ease off the exercise if you start to have pain. Your doctor or physical therapist will tell you when you can start these exercises and which ones will work best for you. How to do the exercises  Iliotibial band stretch    1. Lean sideways against a wall. If you are not steady on your feet, hold on to a chair or counter. 2. Stand on the leg with the affected hip, with that leg close to the wall. Then cross your other leg in front of it. 3. Let your affected hip drop out to the side of your body and against the wall. Then lean away from your affected hip until you feel a stretch. 4. Hold the stretch for 15 to 30 seconds. 5. Repeat 2 to 4 times. Piriformis stretch    1. Lie on your back with your legs straight. 2. Lift your affected leg and bend your knee. With your opposite hand, reach across your body, and then gently pull your knee toward your opposite shoulder. 3. Hold the stretch for 15 to 30 seconds. 4. Repeat 2 to 4 times. Hamstring wall stretch    1. Lie on your back in a doorway, with your good leg through the open door. 2. Slide your affected leg up the wall to straighten your knee. You should feel a gentle stretch down the back of your leg. ¨ Do not arch your back. ¨ Do not bend either knee. ¨ Keep one heel touching the floor and the other heel touching the wall. Do not point your toes. 3. Hold the stretch for at least 1 minute to begin. Then try to lengthen the time you hold the stretch to as long as 6 minutes. 4. Repeat 2 to 4 times. If you do not have a place to do this exercise in a doorway, there is another way to do it:  1. Lie on your back, and bend the knee of your affected leg. 2. Loop a towel under the ball and toes of that foot, and hold the ends of the towel in your hands.   3. Straighten your knee, and slowly pull back on the towel. You should feel a gentle stretch down the back of your leg. 4. Hold the stretch for 15 to 30 seconds. Or even better, hold the stretch for 1 minute if you can. 5. Repeat 2 to 4 times. Follow-up care is a key part of your treatment and safety. Be sure to make and go to all appointments, and call your doctor if you are having problems. It's also a good idea to know your test results and keep a list of the medicines you take. Where can you learn more? Go to http://yuni-michael.info/. Enter P252 in the search box to learn more about \"Iliotibial Band Syndrome: Exercises. \"  Current as of: May 23, 2016  Content Version: 11.1  © 2006-2016 [employer]. Care instructions adapted under license by [employer] (which disclaims liability or warranty for this information). If you have questions about a medical condition or this instruction, always ask your healthcare professional. [address] any warranty or liability for your use of this information. Bursitis: Care Instructions  Your Care Instructions  A bursa is a small sac of fluid that helps the tissues around a joint slide over one another easily. Injury or overuse of a joint can cause pain, redness, and inflammation in the bursa (bursitis). Bursitis usually gets better if you avoid the activity that caused it. You can help prevent bursitis from coming back by doing stretching and strengthening exercises. You may also need to change the way you do some activities. Follow-up care is a key part of your treatment and safety. Be sure to make and go to all appointments, and call your doctor if you are having problems. Its also a good idea to know your test results and keep a list of the medicines you take. How can you care for yourself at home? · Put ice or a cold pack on the area for 10 to 20 minutes at a time.  Try to do this every 1 to 2 hours for the next 3 days (when you are awake) or until the swelling goes down. Put a thin cloth between the ice and your skin. · After the 3 days of using ice, you may use heat on the area. You can use a hot water bottle; a warm, moist towel; or a heating pad set on low. You can also try alternating heat and ice. · Rest the area where you have pain. Stop any activities that cause pain. Switch to activities that do not stress the area. · Take pain medicines exactly as directed. ¨ If the doctor gave you a prescription medicine for pain, take it as prescribed. ¨ If you are not taking a prescription pain medicine, ask your doctor if you can take an over-the-counter medicine. ¨ Do not take two or more pain medicines at the same time unless the doctor told you to. Many pain medicines have acetaminophen, which is Tylenol. Too much acetaminophen (Tylenol) can be harmful. · To prevent stiffness, gently move the joint as much as you can without pain every day. As the pain gets better, keep doing range-of-motion exercises. Ask your doctor for exercises that will make the muscles around the joint stronger. Do these as directed. · You can slowly return to the activity that caused the pain, but do it with less effort until you can do it without pain or swelling. Be sure to warm up before and stretch after you do the activity. When should you call for help? Call your doctor now or seek immediate medical care if:  · You get a fever and chills. · You have increased swelling or redness in a joint. · You cannot use a joint, or the pain in a joint gets worse. Watch closely for changes in your health, and be sure to contact your doctor if:  · You have pain for 2 weeks or longer despite home treatment. Where can you learn more? Go to http://yuni-michael.info/. Enter E747 in the search box to learn more about \"Bursitis: Care Instructions. \"  Current as of: May 23, 2016  Content Version: 11.1  © 6653-2076 [employer]. Care instructions adapted under license by [employer] (which disclaims liability or warranty for this information). If you have questions about a medical condition or this instruction, always ask your healthcare professional. [address] any warranty or liability for your use of this information.

## 2017-02-09 NOTE — MR AVS SNAPSHOT
Visit Information Date & Time Provider Department Dept. Phone Encounter #  
 2/9/2017 10:15 AM Mando Varela MD [facility] Internal Medicine [phone] [id number] Follow-up Instructions Return if symptoms worsen or fail to improve. Upcoming Health Maintenance Date Due Hepatitis C Screening 1960 DTaP/Tdap/Td series (1 - Tdap) 8/8/1981 PAP AKA CERVICAL CYTOLOGY 8/8/1981 FOBT Q 1 YEAR AGE 50-75 8/8/2010 BREAST CANCER SCRN MAMMOGRAM 6/26/2011 INFLUENZA AGE 9 TO ADULT 8/1/2016 Allergies as of 2/9/2017  Review Complete On: 2/9/2017 By: Jumana Hernandez LPN No Known Allergies Current Immunizations  Reviewed on 4/1/2013 No immunizations on file. Not reviewed this visit You Were Diagnosed With   
  
 Codes Comments Trochanteric bursitis of right hip    -  Primary ICD-10-CM: M70.61 ICD-9-CM: 726.5 Iliotibial band tendinitis of right side     ICD-10-CM: M76.31 
ICD-9-CM: 728.89 Vitals BP Pulse Temp Resp Height(growth percentile) Weight(growth percentile) (!) 66/49 (BP 1 Location: Left arm, BP Patient Position: Sitting) 75 96.6 °F (35.9 °C) (Oral) 17 5' 7\" (1.702 m) 153 lb 9.6 oz (69.7 kg) SpO2 BMI OB Status Smoking Status 99% 24.06 kg/m2 Postmenopausal Former Smoker Vitals History BMI and BSA Data Body Mass Index Body Surface Area 24.06 kg/m 2 1.82 m 2 Preferred Pharmacy Pharmacy Name Phone [address] [place] [address], [facility] [address] [phone] Your Updated Medication List  
  
   
This list is accurate as of: 2/9/17 10:48 AM.  Always use your most recent med list.  
  
  
  
  
 doxycycline 20 mg tablet Commonly known as:  PERIOSTAT Take 20 mg by mouth two (2) times a day. ibuprofen 800 mg tablet Commonly known as:  MOTRIN Take 1 Tab by mouth every eight (8) hours as needed for Pain. LEVOXYL 112 mcg tablet Generic drug:  levothyroxine Take  by mouth Daily (before breakfast). methylPREDNISolone 4 mg tablet Commonly known as:  Leigha Reason Follow pack instruction. senna-docusate 8.6-50 mg per tablet Commonly known as:  Aurora Distad Take 2 Tabs by mouth daily. Indications: Constipation  
  
 simethicone 80 mg chewable tablet Commonly known as:  Ulis Lundborg Take 1 Tab by mouth every six (6) hours as needed for Flatulence. Indications: FLATULENCE Prescriptions Sent to Pharmacy Refills  
 methylPREDNISolone (MEDROL DOSEPACK) 4 mg tablet 0 Sig: Follow pack instruction. Class: Normal  
 Pharmacy: [address] [place] [address], [address] [facility] Ph #: [phone]  
 ibuprofen (MOTRIN) 800 mg tablet 0 Sig: Take 1 Tab by mouth every eight (8) hours as needed for Pain. Class: Normal  
 Pharmacy: [facility] [address] [facility], [facility] Ph #: [phone] Route: Oral  
  
Follow-up Instructions Return if symptoms worsen or fail to improve. Patient Instructions Iliotibial Band Syndrome: Exercises Your Care Instructions Here are some examples of typical rehabilitation exercises for your condition. Start each exercise slowly. Ease off the exercise if you start to have pain. Your doctor or physical therapist will tell you when you can start these exercises and which ones will work best for you. How to do the exercises Iliotibial band stretch 1. Lean sideways against a wall. If you are not steady on your feet, hold on to a chair or counter. 2. Stand on the leg with the affected hip, with that leg close to the wall. Then cross your other leg in front of it. 3. Let your affected hip drop out to the side of your body and against the wall. Then lean away from your affected hip until you feel a stretch. 4. Hold the stretch for 15 to 30 seconds. 5. Repeat 2 to 4 times. Piriformis stretch 1. Lie on your back with your legs straight. 2. Lift your affected leg and bend your knee. With your opposite hand, reach across your body, and then gently pull your knee toward your opposite shoulder. 3. Hold the stretch for 15 to 30 seconds. 4. Repeat 2 to 4 times. Hamstring wall stretch 1. Lie on your back in a doorway, with your good leg through the open door. 2. Slide your affected leg up the wall to straighten your knee. You should feel a gentle stretch down the back of your leg. ¨ Do not arch your back. ¨ Do not bend either knee. ¨ Keep one heel touching the floor and the other heel touching the wall. Do not point your toes. 3. Hold the stretch for at least 1 minute to begin. Then try to lengthen the time you hold the stretch to as long as 6 minutes. 4. Repeat 2 to 4 times. If you do not have a place to do this exercise in a doorway, there is another way to do it: 1. Lie on your back, and bend the knee of your affected leg. 2. Loop a towel under the ball and toes of that foot, and hold the ends of the towel in your hands. 3. Straighten your knee, and slowly pull back on the towel. You should feel a gentle stretch down the back of your leg. 4. Hold the stretch for 15 to 30 seconds. Or even better, hold the stretch for 1 minute if you can. 5. Repeat 2 to 4 times. Follow-up care is a key part of your treatment and safety. Be sure to make and go to all appointments, and call your doctor if you are having problems. It's also a good idea to know your test results and keep a list of the medicines you take. Where can you learn more? Go to http://yuni-michael.info/. Enter P252 in the search box to learn more about \"Iliotibial Band Syndrome: Exercises. \" Current as of: May 23, 2016 Content Version: 11.1 © 1132-0416 [employer].  Care instructions adapted under license by [employer] (which disclaims liability or warranty for this information). If you have questions about a medical condition or this instruction, always ask your healthcare professional. [address] any warranty or liability for your use of this information. Bursitis: Care Instructions Your Care Instructions A bursa is a small sac of fluid that helps the tissues around a joint slide over one another easily. Injury or overuse of a joint can cause pain, redness, and inflammation in the bursa (bursitis). Bursitis usually gets better if you avoid the activity that caused it. You can help prevent bursitis from coming back by doing stretching and strengthening exercises. You may also need to change the way you do some activities. Follow-up care is a key part of your treatment and safety. Be sure to make and go to all appointments, and call your doctor if you are having problems. Its also a good idea to know your test results and keep a list of the medicines you take. How can you care for yourself at home? · Put ice or a cold pack on the area for 10 to 20 minutes at a time. Try to do this every 1 to 2 hours for the next 3 days (when you are awake) or until the swelling goes down. Put a thin cloth between the ice and your skin. · After the 3 days of using ice, you may use heat on the area. You can use a hot water bottle; a warm, moist towel; or a heating pad set on low. You can also try alternating heat and ice. · Rest the area where you have pain. Stop any activities that cause pain. Switch to activities that do not stress the area. · Take pain medicines exactly as directed. ¨ If the doctor gave you a prescription medicine for pain, take it as prescribed. ¨ If you are not taking a prescription pain medicine, ask your doctor if you can take an over-the-counter medicine. ¨ Do not take two or more pain medicines at the same time unless the doctor told you to.  Many pain medicines have acetaminophen, which is Tylenol. Too much acetaminophen (Tylenol) can be harmful. · To prevent stiffness, gently move the joint as much as you can without pain every day. As the pain gets better, keep doing range-of-motion exercises. Ask your doctor for exercises that will make the muscles around the joint stronger. Do these as directed. · You can slowly return to the activity that caused the pain, but do it with less effort until you can do it without pain or swelling. Be sure to warm up before and stretch after you do the activity. When should you call for help? Call your doctor now or seek immediate medical care if: 
· You get a fever and chills. · You have increased swelling or redness in a joint. · You cannot use a joint, or the pain in a joint gets worse. Watch closely for changes in your health, and be sure to contact your doctor if: 
· You have pain for 2 weeks or longer despite home treatment. Where can you learn more? Go to http://yuni-michael.info/. Enter O624 in the search box to learn more about \"Bursitis: Care Instructions. \" Current as of: May 23, 2016 Content Version: 11.1 © 5582-0728 [employer]. Care instructions adapted under license by [employer] (which disclaims liability or warranty for this information). If you have questions about a medical condition or this instruction, always ask your healthcare professional. [address] any warranty or liability for your use of this information. Introducing [facility]! Susie Sanchez introduces [employer] patient portal. Now you can access parts of your medical record, email your doctor's office, and request medication refills online. 1. In your internet browser, go to [email or web address] 2. Click on the First Time User? Click Here link in the Sign In box. You will see the New Member Sign Up page. 3. Enter your [employer] Access Code exactly as it appears below.  You will not need to use this code after youve completed the sign-up process. If you do not sign up before the expiration date, you must request a new code. · Instabug Access Code: [email or web address] Expires: 4/25/2017  9:27 AM 
 
4. Enter the last four digits of your Social Security Number (xxxx) and Date of Birth (mm/dd/yyyy) as indicated and click Submit. You will be taken to the next sign-up page. 5. Create a Instabug ID. This will be your Instabug login ID and cannot be changed, so think of one that is secure and easy to remember. 6. Create a Inst[employer] password. You can change your password at any time. 7. Enter your Password Reset Question and Answer. This can be used at a later time if you forget your password. 8. Enter your e-mail address. You will receive e-mail notification when new information is available in [address]. 9. Click Sign Up. You can now view and download portions of your medical record. 10. Click the Download Summary menu link to download a portable copy of your medical information. If you have questions, please visit the Frequently Asked Questions section of the Instabug website. Remember, Instabug is NOT to be used for urgent needs. For medical emergencies, dial 911. Now available from your iPhone and Android! Please provide this summary of care documentation to your next provider. Your primary care clinician is listed as Merlinda Andrews. If you have any questions after today's visit, please call [phone].

## 2017-02-13 ENCOUNTER — TELEPHONE (OUTPATIENT)
Dept: INTERNAL MEDICINE CLINIC | Age: 57
End: 2017-02-13

## 2017-02-13 NOTE — TELEPHONE ENCOUNTER
Pt rates pain 4-5/10. States that pain is about 70% better. Still unable to sit for  Long periods of time. Taking medication as prescribed. Thinks she will need another 2 days off before returning to work. She will call back on Wednesday and let me know so that we can finish filling out STD form and give RTW note.

## 2017-02-16 ENCOUNTER — TELEPHONE (OUTPATIENT)
Dept: INTERNAL MEDICINE CLINIC | Age: 57
End: 2017-02-16

## 2017-02-16 NOTE — TELEPHONE ENCOUNTER
Pt called and stated she started back to work today and would like to let Dr. Hermila Valdez know so she can start on her disability forms

## 2017-09-25 ENCOUNTER — OFFICE VISIT (OUTPATIENT)
Dept: INTERNAL MEDICINE CLINIC | Age: 57
End: 2017-09-25

## 2017-09-25 VITALS
TEMPERATURE: 97.4 F | WEIGHT: 168 LBS | HEIGHT: 67 IN | RESPIRATION RATE: 18 BRPM | OXYGEN SATURATION: 99 % | DIASTOLIC BLOOD PRESSURE: 50 MMHG | HEART RATE: 85 BPM | SYSTOLIC BLOOD PRESSURE: 101 MMHG | BODY MASS INDEX: 26.37 KG/M2

## 2017-09-25 DIAGNOSIS — S46.812A TRAPEZIUS MUSCLE STRAIN, LEFT, INITIAL ENCOUNTER: Primary | ICD-10-CM

## 2017-09-25 RX ORDER — IBUPROFEN 800 MG/1
800 TABLET ORAL
Qty: 30 TAB | Refills: 0 | Status: SHIPPED | OUTPATIENT
Start: 2017-09-25 | End: 2018-10-30 | Stop reason: SDUPTHER

## 2017-09-25 RX ORDER — IBUPROFEN 200 MG
800 TABLET ORAL
COMMUNITY
End: 2017-09-25

## 2017-09-25 RX ORDER — CYCLOBENZAPRINE HCL 10 MG
10 TABLET ORAL
Qty: 30 TAB | Refills: 0 | Status: SHIPPED | OUTPATIENT
Start: 2017-09-25 | End: 2018-04-03 | Stop reason: ALTCHOICE

## 2017-09-25 RX ORDER — FEXOFENADINE HCL AND PSEUDOEPHEDRINE HCI 180; 240 MG/1; MG/1
1 TABLET, EXTENDED RELEASE ORAL DAILY
COMMUNITY
End: 2018-10-30 | Stop reason: ALTCHOICE

## 2017-09-25 NOTE — PATIENT INSTRUCTIONS
Rhomboid Muscle Strain: Rehab Exercises  Your Care Instructions  Here are some examples of typical rehabilitation exercises for your condition. Start each exercise slowly. Ease off the exercise if you start to have pain. Your doctor or physical therapist will tell you when you can start these exercises and which ones will work best for you. How to do the exercises  Lower neck and upper back (rhomboid) stretch    1. Stretch your arms out in front of your body. Clasp one hand on top of your other hand. 2. Gently reach out so that you feel your shoulder blades stretching away from each other. 3. Gently bend your head forward. 4. Hold for 15 to 30 seconds. 5. Repeat 2 to 4 times. Resisted rows    Note: For this exercise, you will need elastic exercise material, such as surgical tubing or Thera-Band. 1. Put the band around a solid object, such as a bedpost, at about waist level. Stand facing where you have placed the band. Hold equal lengths of the band in each hand. 2. Start with your arms held out in front of you. 3. Pull the bands back, and move your shoulder blades together. As you finish, your elbows should be at your side and bent at 90 degrees (like the angle of the letter \"L\"). 4. Return to the starting position. 5. Repeat 8 to 12 times. Neck stretches    1. Look straight ahead, and tip your right ear to your right shoulder. Do not let your left shoulder rise as you tip your head to the right. 2. Hold for 15 to 30 seconds. 3. Tilt your head to the left. Do not let your right shoulder rise as you tip your head to the left. 4. Hold for 15 to 30 seconds. 5. Repeat 2 to 4 times to each side. Neck rotation    1. Sit in a firm chair, or stand up straight. 2. Keeping your chin level, turn your head to the right, and hold for 15 to 30 seconds. 3. Turn your head to the left, and hold for 15 to 30 seconds. 4. Repeat 2 to 4 times to each side.   Follow-up care is a key part of your treatment and safety. Be sure to make and go to all appointments, and call your doctor if you are having problems. It's also a good idea to know your test results and keep a list of the medicines you take. Where can you learn more? Go to http://yuni-michael.info/. Enter 0841 [id number] in the search box to learn more about \"Rhomboid Muscle Strain: Rehab Exercises. \"  Current as of: March 21, 2017  Content Version: 11.3  © 8151-8644 [employer]. Care instructions adapted under license by [employer] (which disclaims liability or warranty for this information). If you have questions about a medical condition or this instruction, always ask your healthcare professional. [address] any warranty or liability for your use of this information.

## 2017-09-25 NOTE — PROGRESS NOTES
Subjective:     Chief Complaint   Patient presents with    Shoulder Pain     left shoulder pain x 1 week. Thinks she pulled a muscle        She  is a 62y.o. year old female who presents with a complain of pain in her left upper back for the past one week after she helped her friend to move heavy stuff. Reports that pain is constant, radiates to her top of the shoulder, turning  head to left side hurts. ROM of left shoulder is normal. Reports that she had taken 4 OTC advil this morning which helps . Pertinent items are noted in HPI. Objective:     Vitals:    09/25/17 0928   BP: 101/50   Pulse: 85   Resp: 18   Temp: 97.4 °F (36.3 °C)   TempSrc: Oral   SpO2: 99%   Weight: 168 lb (76.2 kg)   Height: 5' 7\" (1.702 m)       Physical Examination: General appearance - alert, well appearing, and in no distress, oriented to person, place, and time and normal appearing weight  Mental status - alert, oriented to person, place, and time, normal mood, behavior, speech, dress, motor activity, and thought processes  Neck - supple, no significant adenopathy. No swelling noted. Left lateral  movement of neck triggers the pain. Extension of the cervical area triggers the pain. Otherwise all ROM is normal.   Back exam - full range of motion, no tenderness, palpable spasm or pain on motion  Musculoskeletal - no joint tenderness, deformity or swelling.  Shoulder ROM is normal.       No Known Allergies   Social History     Social History    Marital status: SINGLE     Spouse name: N/A    Number of children: N/A    Years of education: N/A     Social History Main Topics    Smoking status: Former Smoker     Packs/day: 0.50     Years: 30.00     Quit date: 1/21/2016    Smokeless tobacco: Never Used    Alcohol use 0.6 oz/week     1 Glasses of wine per week      Comment: weekends    Drug use: No    Sexual activity: Yes     Other Topics Concern    None     Social History Narrative      Family History   Problem Relation Age of Onset  Heart Disease Mother     Hypertension Mother     Diabetes Brother     Hypertension Brother       Past Surgical History:   Procedure Laterality Date    HX APPENDECTOMY      HX GYN      dysplasia      Past Medical History:   Diagnosis Date    Thyroid disease       Current Outpatient Prescriptions   Medication Sig Dispense Refill    fexofenadine-pseudoephedrine (ALLEGRA-D 24 HOUR) 180-240 mg per tablet Take 1 Tab by mouth daily.  cyclobenzaprine (FLEXERIL) 10 mg tablet Take 1 Tab by mouth three (3) times daily as needed for Muscle Spasm(s). Indications: MUSCLE SPASM 30 Tab 0    ibuprofen (MOTRIN) 800 mg tablet Take 1 Tab by mouth every eight (8) hours as needed for Pain. 30 Tab 0    doxycycline (PERIOSTAT) 20 mg tablet Take 20 mg by mouth two (2) times a day.  levothyroxine (LEVOXYL) 112 mcg tablet Take  by mouth Daily (before breakfast). Assessment/ Plan:   Diagnoses and all orders for this visit:    1. Trapezius muscle strain, left, initial encounter  -     cyclobenzaprine (FLEXERIL) 10 mg tablet; Take 1 Tab by mouth three (3) times daily as needed for Muscle Spasm(s). Indications: MUSCLE SPASM  -     ibuprofen (MOTRIN) 800 mg tablet; Take 1 Tab by mouth every eight (8) hours as needed for Pain.        -     Advised heat and cold therapy. -     Stretching exercise. -     Avoid triggering factors. Medication risks/benefits/costs/interactions/alternatives discussed with patient. Advised patient to call back or return to office if symptoms worsen/change/persist. If patient cannot reach us or should anything more severe/urgent arise he/she should proceed directly to the nearest emergency department. Discussed expected course/resolution/complications of diagnosis in detail with patient. Patient given a written after visit summary which includes her diagnoses, current medications and vitals. Patient expressed understanding with the diagnosis and plan. Follow-up Disposition:  Return in about 1 month (around 10/25/2017) for complete physical  and fasting blood work. Arbutus Ring

## 2017-09-25 NOTE — MR AVS SNAPSHOT
Visit Information Date & Time Provider Department Dept. Phone Encounter #  
 9/25/2017  9:30 AM Jaja Godoy MD [facility] Internal Medicine [phone] [id number] Follow-up Instructions Return in about 1 month (around 10/25/2017) for complete physical  and fasting blood work. Chong Harris Upcoming Health Maintenance Date Due Hepatitis C Screening 1960 DTaP/Tdap/Td series (1 - Tdap) 8/8/1981 PAP AKA CERVICAL CYTOLOGY 8/8/1981 FOBT Q 1 YEAR AGE 50-75 8/8/2010 BREAST CANCER SCRN MAMMOGRAM 6/26/2011 INFLUENZA AGE 9 TO ADULT 8/1/2017 Allergies as of 9/25/2017  Review Complete On: 9/25/2017 By: Antonio Tavarez LPN No Known Allergies Current Immunizations  Reviewed on 4/1/2013 No immunizations on file. Not reviewed this visit You Were Diagnosed With   
  
 Codes Comments Trapezius muscle strain, left, initial encounter    -  Primary ICD-10-CM: C30.132X ICD-9-CM: 840.8 Vitals BP Pulse Temp Resp Height(growth percentile) Weight(growth percentile) 101/50 (BP 1 Location: Left arm, BP Patient Position: Sitting) 85 97.4 °F (36.3 °C) (Oral) 18 5' 7\" (1.702 m) 168 lb (76.2 kg) SpO2 BMI OB Status Smoking Status 99% 26.31 kg/m2 Postmenopausal Former Smoker Vitals History BMI and BSA Data Body Mass Index Body Surface Area  
 26.31 kg/m 2 1.9 m 2 Preferred Pharmacy Pharmacy Name Phone [facility] [address] [place] [address], [facility] [address] [phone] Your Updated Medication List  
  
   
This list is accurate as of: 9/25/17  9:52 AM.  Always use your most recent med list. ALLEGRA-D 24 HOUR 180-240 mg per tablet Generic drug:  fexofenadine-pseudoephedrine Take 1 Tab by mouth daily. cyclobenzaprine 10 mg tablet Commonly known as:  FLEXERIL Take 1 Tab by mouth three (3) times daily as needed for Muscle Spasm(s). Indications: MUSCLE SPASM doxycycline 20 mg tablet Commonly known as:  PERIOSTAT Take 20 mg by mouth two (2) times a day. ibuprofen 800 mg tablet Commonly known as:  MOTRIN Take 1 Tab by mouth every eight (8) hours as needed for Pain. LEVOXYL 112 mcg tablet Generic drug:  levothyroxine Take  by mouth Daily (before breakfast). Prescriptions Sent to Pharmacy Refills  
 cyclobenzaprine (FLEXERIL) 10 mg tablet 0 Sig: Take 1 Tab by mouth three (3) times daily as needed for Muscle Spasm(s). Indications: MUSCLE SPASM Class: Normal  
 Pharmacy: [facility] [address] [place] [address] , [address] [facility] Ph #: [phone] Route: Oral  
 ibuprofen (MOTRIN) 800 mg tablet 0 Sig: Take 1 Tab by mouth every eight (8) hours as needed for Pain. Class: Normal  
 Pharmacy: [facility] [address] [place] [address] , [address] [facility] Ph #: [phone] Route: Oral  
  
Follow-up Instructions Return in about 1 month (around 10/25/2017) for complete physical  and fasting blood work. Jayne Anand Patient Instructions Rhomboid Muscle Strain: Rehab Exercises Your Care Instructions Here are some examples of typical rehabilitation exercises for your condition. Start each exercise slowly. Ease off the exercise if you start to have pain. Your doctor or physical therapist will tell you when you can start these exercises and which ones will work best for you. How to do the exercises Lower neck and upper back (rhomboid) stretch 1. Stretch your arms out in front of your body. Clasp one hand on top of your other hand. 2. Gently reach out so that you feel your shoulder blades stretching away from each other. 3. Gently bend your head forward. 4. Hold for 15 to 30 seconds. 5. Repeat 2 to 4 times. Resisted rows Note: For this exercise, you will need elastic exercise material, such as surgical tubing or Thera-Band. 1. Put the band around a solid object, such as a bedpost, at about waist level. Stand facing where you have placed the band. Hold equal lengths of the band in each hand. 2. Start with your arms held out in front of you. 3. Pull the bands back, and move your shoulder blades together. As you finish, your elbows should be at your side and bent at 90 degrees (like the angle of the letter \"L\"). 4. Return to the starting position. 5. Repeat 8 to 12 times. Neck stretches 1. Look straight ahead, and tip your right ear to your right shoulder. Do not let your left shoulder rise as you tip your head to the right. 2. Hold for 15 to 30 seconds. 3. Tilt your head to the left. Do not let your right shoulder rise as you tip your head to the left. 4. Hold for 15 to 30 seconds. 5. Repeat 2 to 4 times to each side. Neck rotation 1. Sit in a firm chair, or stand up straight. 2. Keeping your chin level, turn your head to the right, and hold for 15 to 30 seconds. 3. Turn your head to the left, and hold for 15 to 30 seconds. 4. Repeat 2 to 4 times to each side. Follow-up care is a key part of your treatment and safety. Be sure to make and go to all appointments, and call your doctor if you are having problems. It's also a good idea to know your test results and keep a list of the medicines you take. Where can you learn more? Go to http://yuni-michael.info/. Enter [id number] in the search box to learn more about \"Rhomboid Muscle Strain: Rehab Exercises. \" Current as of: March 21, 2017 Content Version: 11.3 © 3867-2407 [employer]. Care instructions adapted under license by [employer] (which disclaims liability or warranty for this information). If you have questions about a medical condition or this instruction, always ask your healthcare professional. [address] any warranty or liability for your use of this information. Introducing [address]! Grover Reynolds introduces Aptera patient portal. Now you can access parts of your medical record, email your doctor's office, and request medication refills online. 1. In your internet browser, go to [email or web address] 2. Click on the First Time User? Click Here link in the Sign In box. You will see the New Member Sign Up page. 3. Enter your Aptera Access Code exactly as it appears below. You will not need to use this code after youve completed the sign-up process. If you do not sign up before the expiration date, you must request a new code. · Aptera Access Code: [email or web address] Expires: 12/24/2017  9:52 AM 
 
4. Enter the last four digits of your Social Security Number (xxxx) and Date of Birth (mm/dd/yyyy) as indicated and click Submit. You will be taken to the next sign-up page. 5. Create a Aptera ID. This will be your Aptera login ID and cannot be changed, so think of one that is secure and easy to remember. 6. Create a Aptera password. You can change your password at any time. 7. Enter your Password Reset Question and Answer. This can be used at a later time if you forget your password. 8. Enter your e-mail address. You will receive e-mail notification when new information is available in [address] 9. Click Sign Up. You can now view and download portions of your medical record. 10. Click the Download Summary menu link to download a portable copy of your medical information. If you have questions, please visit the Frequently Asked Questions section of the Aptera website. Remember, Aptera is NOT to be used for urgent needs. For medical emergencies, dial 911. Now available from your iPhone and Android! Please provide this summary of care documentation to your next provider. Your primary care clinician is listed as Mando Fernandez. If you have any questions after today's visit, please call [phone].

## 2018-04-03 ENCOUNTER — OFFICE VISIT (OUTPATIENT)
Dept: INTERNAL MEDICINE CLINIC | Age: 58
End: 2018-04-03

## 2018-04-03 VITALS
BODY MASS INDEX: 26.97 KG/M2 | DIASTOLIC BLOOD PRESSURE: 64 MMHG | TEMPERATURE: 97.2 F | SYSTOLIC BLOOD PRESSURE: 110 MMHG | HEIGHT: 67 IN | WEIGHT: 171.8 LBS | OXYGEN SATURATION: 99 % | RESPIRATION RATE: 18 BRPM | HEART RATE: 89 BPM

## 2018-04-03 DIAGNOSIS — E03.9 ACQUIRED HYPOTHYROIDISM: ICD-10-CM

## 2018-04-03 DIAGNOSIS — Z12.39 SCREENING BREAST EXAMINATION: ICD-10-CM

## 2018-04-03 DIAGNOSIS — Z78.9: ICD-10-CM

## 2018-04-03 DIAGNOSIS — Z00.00 WELL ADULT HEALTH CHECK: Primary | ICD-10-CM

## 2018-04-03 DIAGNOSIS — Z11.59 ENCOUNTER FOR HEPATITIS C SCREENING TEST FOR LOW RISK PATIENT: ICD-10-CM

## 2018-04-03 RX ORDER — LEVOTHYROXINE SODIUM 125 UG/1
TABLET ORAL
COMMUNITY
End: 2020-02-21 | Stop reason: SDUPTHER

## 2018-04-03 NOTE — MR AVS SNAPSHOT
[facility] [address] 
[phone] Patient: Opal Michael MRN: [id number] Leatha Cisneros Visit Information Date & Time Provider Department Dept. Phone Encounter #  
 4/3/2018  9:15 AM Mando Lazaro MD [facility] [phone] Follow-up Instructions Return in about 1 year (around 4/3/2019), or if symptoms worsen or fail to improve. Upcoming Health Maintenance Date Due Hepatitis C Screening 1960 DTaP/Tdap/Td series (1 - Tdap) 8/8/1981 FOBT Q 1 YEAR AGE 50-75 8/8/2010 BREAST CANCER SCRN MAMMOGRAM 6/26/2011 Bone Densitometry 3/24/2020 PAP AKA CERVICAL CYTOLOGY 4/17/2020 Allergies as of 4/3/2018  Review Complete On: 4/3/2018 By: Xiao Bui MD  
 No Known Allergies Current Immunizations  Reviewed on 4/3/2018 Name Date Influenza Vaccine 10/28/2017 Reviewed by Xiao Bui MD on 4/3/2018 at  9:39 AM  
You Were Diagnosed With   
  
 Codes Comments Well adult health check    -  Primary ICD-10-CM: Z00.00 ICD-9-CM: V70.0 Screening breast examination     ICD-10-CM: Z12.31 
ICD-9-CM: V76.10 Encounter for hepatitis C screening test for low risk patient     ICD-10-CM: Z11.59 
ICD-9-CM: V73.89 Vitals BP Pulse Temp Resp Height(growth percentile) 110/64 (BP 1 Location: Left arm, BP Patient Position: Sitting) 89 97.2 °F (36.2 °C) (Temporal) 18 5' 7\" (1.702 m) Weight(growth percentile) SpO2 BMI OB Status Smoking Status 171 lb 12.8 oz (77.9 kg) 99% 26.91 kg/m2 Postmenopausal Current Some Day Smoker Vitals History BMI and BSA Data Body Mass Index Body Surface Area  
 26.91 kg/m 2 1.92 m 2 Your Updated Medication List  
  
   
This list is accurate as of 4/3/18  9:52 AM.  Always use your most recent med list. ALLEGRA-D 24 HOUR 180-240 mg per tablet Generic drug:  fexofenadine-pseudoephedrine Take 1 Tab by mouth daily. doxycycline 20 mg tablet Commonly known as:  PERIOSTAT Take 20 mg by mouth two (2) times a day. ibuprofen 800 mg tablet Commonly known as:  MOTRIN Take 1 Tab by mouth every eight (8) hours as needed for Pain. LEVOXYL 125 mcg tablet Generic drug:  levothyroxine Take  by mouth Daily (before breakfast). We Performed the Following CBC WITH AUTOMATED DIFF [60563 CPT(R)] HEMOGLOBIN A1C WITH EAG [92460 CPT(R)] HEPATITIS C AB [61252 CPT(R)] LIPID PANEL [74426 CPT(R)] METABOLIC PANEL, COMPREHENSIVE [35050 CPT(R)] Follow-up Instructions Return in about 1 year (around 4/3/2019), or if symptoms worsen or fail to improve. To-Do List   
 04/03/2018 Imaging:  [facility] MAMMO BI SCREENING INCL CAD Patient Instructions Well Visit, Women 48 to 72: Care Instructions Your Care Instructions Physical exams can help you stay healthy. Your doctor has checked your overall health and may have suggested ways to take good care of yourself. He or she also may have recommended tests. At home, you can help prevent illness with healthy eating, regular exercise, and other steps. Follow-up care is a key part of your treatment and safety. Be sure to make and go to all appointments, and call your doctor if you are having problems. It's also a good idea to know your test results and keep a list of the medicines you take. How can you care for yourself at home? · Reach and stay at a healthy weight. This will lower your risk for many problems, such as obesity, diabetes, heart disease, and high blood pressure. · Get at least 30 minutes of exercise on most days of the week. Walking is a good choice. You also may want to do other activities, such as running, swimming, cycling, or playing tennis or team sports. · Do not smoke. Smoking can make health problems worse.  If you need help quitting, talk to your doctor about stop-smoking programs and medicines. These can increase your chances of quitting for good. · Protect your skin from too much sun. When you're outdoors from 10 a.m. to 4 p.m., stay in the shade or cover up with clothing and a hat with a wide brim. Wear sunglasses that block UV rays. Even when it's cloudy, put broad-spectrum sunscreen (SPF 30 or higher) on any exposed skin. · See a dentist one or two times a year for checkups and to have your teeth cleaned. · Wear a seat belt in the car. · Limit alcohol to 1 drink a day. Too much alcohol can cause health problems. Follow your doctor's advice about when to have certain tests. These tests can spot problems early. · Cholesterol. Your doctor will tell you how often to have this done based on your age, family history, or other things that can increase your risk for heart attack and stroke. · Blood pressure. Have your blood pressure checked during a routine doctor visit. Your doctor will tell you how often to check your blood pressure based on your age, your blood pressure results, and other factors. · Mammogram. Ask your doctor how often you should have a mammogram, which is an X-ray of your breasts. A mammogram can spot breast cancer before it can be felt and when it is easiest to treat. · Pap test and pelvic exam. Ask your doctor how often you should have a Pap test. You may not need to have a Pap test as often as you used to. · Vision. Have your eyes checked every year or two or as often as your doctor suggests. Some experts recommend that you have yearly exams for glaucoma and other age-related eye problems starting at age 48. · Hearing. Tell your doctor if you notice any change in your hearing. You can have tests to find out how well you hear. · Diabetes. Ask your doctor whether you should have tests for diabetes. · Colon cancer. You should begin tests for colon cancer at age 48.  You may have one of several tests. Your doctor will tell you how often to have tests based on your age and risk. Risks include whether you already had a precancerous polyp removed from your colon or whether your parents, sisters and brothers, or children have had colon cancer. · Thyroid disease. Talk to your doctor about whether to have your thyroid checked as part of a regular physical exam. Women have an increased chance of a thyroid problem. · Osteoporosis. You should begin tests for bone density at age 72. If you are younger than 72, ask your doctor whether you have factors that may increase your risk for this disease. You may want to have this test before age 72. · Heart attack and stroke risk. At least every 4 to 6 years, you should have your risk for heart attack and stroke assessed. Your doctor uses factors such as your age, blood pressure, cholesterol, and whether you smoke or have diabetes to show what your risk for a heart attack or stroke is over the next 10 years. When should you call for help? Watch closely for changes in your health, and be sure to contact your doctor if you have any problems or symptoms that concern you. Where can you learn more? Go to http://yuni-michael.info/. Enter J342 in the search box to learn more about \"Well Visit, Women 50 to 72: Care Instructions. \" Current as of: May 12, 2017 Content Version: 11.4 © 0851-1937 [employer]orporated. Care instructions adapted under license by [employer] (which disclaims liability or warranty for this information). If you have questions about a medical condition or this instruction, always ask your healthcare professional. [place] [address] any warranty or liability for your use of this information. Introducing [facility]!    
 [facility] introduces Isentropic patient portal. Now you can access parts of your medical record, email your doctor's office, and request medication refills online. 1. In your internet browser, go to [email or web address] 2. Click on the First Time User? Click Here link in the Sign In box. You will see the New Member Sign Up page. 3. Enter your tolingo Access Code exactly as it appears below. You will not need to use this code after youve completed the sign-up process. If you do not sign up before the expiration date, you must request a new code. · tolingo Access Code: [email or web address] Expires: 7/2/2018  9:52 AM 
 
4. Enter the last four digits of your Social Security Number (xxxx) and Date of Birth (mm/dd/yyyy) as indicated and click Submit. You will be taken to the next sign-up page. 5. Create a tolingo ID. This will be your tolingo login ID and cannot be changed, so think of one that is secure and easy to remember. 6. Create a tolingo password. You can change your password at any time. 7. Enter your Password Reset Question and Answer. This can be used at a later time if you forget your password. 8. Enter your e-mail address. You will receive e-mail notification when new information is available in [address] 9. Click Sign Up. You can now view and download portions of your medical record. 10. Click the Download Summary menu link to download a portable copy of your medical information. If you have questions, please visit the Frequently Asked Questions section of the tolingo website. Remember, tolingo is NOT to be used for urgent needs. For medical emergencies, dial 911. Now available from your iPhone and Android! Please provide this summary of care documentation to your next provider. Your primary care clinician is listed as Mando Fernandez. If you have any questions after today's visit, please call [phone].

## 2018-04-03 NOTE — ACP (ADVANCE CARE PLANNING)
Discussed medical directive with patient. She states that she would like to have CPR if situation arises but she would not like to be on artificial machine. States that at any case if her attending physician determines that she has terminal condition where the application of life prolonging procedures would serve only to artificially prolong the the dying process, she direct that such procedures be withheld or withdrawn. She states that in that situation she would prefer to die naturally with only the administration of medication or the performances of any medical procedures deemed necessary to provided her with comfort or alleviate the pain.

## 2018-04-03 NOTE — PROGRESS NOTES
Subjective:   62 y.o. female for Well Woman Check. Just had her colonoscopy done recently. will obtain records. Her gyne and breast care is done elsewhere by her Ob-Gyne physician. Has been following up with Dr. Jennifer Mclean for thyroid issue. Patient reports that she had carotid and abdominal aorta doppler recently. She had asked to send all the reports to my office. Patient Active Problem List   Diagnosis Code    S/P laparoscopic appendectomy Z90.49     Current Outpatient Prescriptions   Medication Sig Dispense Refill    levothyroxine (LEVOXYL) 125 mcg tablet Take  by mouth Daily (before breakfast).  fexofenadine-pseudoephedrine (ALLEGRA-D 24 HOUR) 180-240 mg per tablet Take 1 Tab by mouth daily.  doxycycline (PERIOSTAT) 20 mg tablet Take 20 mg by mouth two (2) times a day.  ibuprofen (MOTRIN) 800 mg tablet Take 1 Tab by mouth every eight (8) hours as needed for Pain.  30 Tab 0     No Known Allergies  Past Medical History:   Diagnosis Date    Thyroid disease      Past Surgical History:   Procedure Laterality Date    HX APPENDECTOMY      HX GYN      dysplasia     Social History   Substance Use Topics    Smoking status: Current Some Day Smoker     Years: 30.00     Last attempt to quit: 1/21/2016    Smokeless tobacco: Never Used    Alcohol use 0.6 oz/week     1 Glasses of wine per week      Comment: weekends        Lab Results  Component Value Date/Time   Cholesterol, total 176 04/03/2010 07:42 AM   HDL Cholesterol 60 04/03/2010 07:42 AM   LDL, calculated 105 (H) 04/03/2010 07:42 AM   Triglyceride 55 04/03/2010 07:42 AM   CHOL/HDL Ratio 2.9 04/03/2010 07:42 AM     Lab Results  Component Value Date/Time   GFR est non-AA >60 01/30/2017 11:35 AM   GFR est AA >60 01/30/2017 11:35 AM   Creatinine 0.53 (L) 01/30/2017 11:35 AM   BUN 10 01/30/2017 11:35 AM   Sodium 137 01/30/2017 11:35 AM   Potassium 4.1 01/30/2017 11:35 AM   Chloride 101 01/30/2017 11:35 AM   CO2 28 01/30/2017 11:35 AM No results found for: HBA1C, [id number], HGBE8, NLF4VPQJ, EYF2VDNC      ROS: Feeling generally well. No TIA's or unusual headaches, no dysphagia. No prolonged cough. No dyspnea or chest pain on exertion. No abdominal pain, change in bowel habits, black or bloody stools. No urinary tract symptoms. No new or unusual musculoskeletal symptoms. Specific concerns today: see HPI. .    Objective: The patient appears well, alert, oriented x 3, in no distress. Visit Vitals    /64 (BP 1 Location: Left arm, BP Patient Position: Sitting)    Pulse 89    Temp 97.2 °F (36.2 °C) (Temporal)    Resp 18    Ht 5' 7\" (1.702 m)    Wt 171 lb 12.8 oz (77.9 kg)    SpO2 99%    BMI 26.91 kg/m2     ENT normal.  Neck supple. No adenopathy or thyromegaly. JULIANE. Lungs are clear, good air entry, no wheezes, rhonchi or rales. S1 and S2 normal, no murmurs, regular rate and rhythm. Abdomen soft without tenderness, guarding, mass or organomegaly. Extremities show no edema, normal peripheral pulses. Neurological is normal, no focal findings. Breast and Pelvic exams are deferred. Assessment/Plan:   Well Woman  lose weight, increase physical activity, stop smoking (advice and handout given), follow low fat diet, follow low salt diet, routine labs ordered, call if any problems    ICD-10-CM ICD-9-CM    1. Well adult health check Z00.00 V70.0 CBC WITH AUTOMATED DIFF      METABOLIC PANEL, COMPREHENSIVE      LIPID PANEL      HEMOGLOBIN A1C WITH EAG   2. Screening breast examination Z12.31 V76.10 [facility]ID MAMMO BI SCREENING INCL CAD   3. Encounter for hepatitis C screening test for low risk patient Z11.59 V73.89 HEPATITIS C AB   4. Advance directive indicates patient wish for resuscitation by cardiopulmonary resuscitation (CPR) and no intubation Z78.9 V49.89 PARTIAL CODE     Diagnoses and all orders for this visit:    1.  Well adult health check  -     CBC WITH AUTOMATED DIFF  -     METABOLIC PANEL, COMPREHENSIVE  -     LIPID PANEL  - HEMOGLOBIN A1C WITH EAG    2. Screening breast examination  -     [facility] MAMMO BI SCREENING INCL CAD; Future    3. Encounter for hepatitis C screening test for low risk patient  -     HEPATITIS C AB    4. Advance directive indicates patient wish for resuscitation by cardiopulmonary resuscitation (CPR) and no intubation  -     PARTIAL CODE    5. Acquired hypothyroidism       - continue follow up with Dr. Inge Guthrie. Follow-up Disposition:  Return in about 1 year (around 4/3/2019), or if symptoms worsen or fail to improve.   reviewed diet, exercise and weight control  very strongly urged to quit smoking to reduce cardiovascular risk  reviewed medications and side effects in detail

## 2018-04-03 NOTE — PATIENT INSTRUCTIONS
Well Visit, Women 48 to 72: Care Instructions  Your Care Instructions    Physical exams can help you stay healthy. Your doctor has checked your overall health and may have suggested ways to take good care of yourself. He or she also may have recommended tests. At home, you can help prevent illness with healthy eating, regular exercise, and other steps. Follow-up care is a key part of your treatment and safety. Be sure to make and go to all appointments, and call your doctor if you are having problems. It's also a good idea to know your test results and keep a list of the medicines you take. How can you care for yourself at home? · Reach and stay at a healthy weight. This will lower your risk for many problems, such as obesity, diabetes, heart disease, and high blood pressure. · Get at least 30 minutes of exercise on most days of the week. Walking is a good choice. You also may want to do other activities, such as running, swimming, cycling, or playing tennis or team sports. · Do not smoke. Smoking can make health problems worse. If you need help quitting, talk to your doctor about stop-smoking programs and medicines. These can increase your chances of quitting for good. · Protect your skin from too much sun. When you're outdoors from 10 a.m. to 4 p.m., stay in the shade or cover up with clothing and a hat with a wide brim. Wear sunglasses that block UV rays. Even when it's cloudy, put broad-spectrum sunscreen (SPF 30 or higher) on any exposed skin. · See a dentist one or two times a year for checkups and to have your teeth cleaned. · Wear a seat belt in the car. · Limit alcohol to 1 drink a day. Too much alcohol can cause health problems. Follow your doctor's advice about when to have certain tests. These tests can spot problems early. · Cholesterol.  Your doctor will tell you how often to have this done based on your age, family history, or other things that can increase your risk for heart attack and stroke. · Blood pressure. Have your blood pressure checked during a routine doctor visit. Your doctor will tell you how often to check your blood pressure based on your age, your blood pressure results, and other factors. · Mammogram. Ask your doctor how often you should have a mammogram, which is an X-ray of your breasts. A mammogram can spot breast cancer before it can be felt and when it is easiest to treat. · Pap test and pelvic exam. Ask your doctor how often you should have a Pap test. You may not need to have a Pap test as often as you used to. · Vision. Have your eyes checked every year or two or as often as your doctor suggests. Some experts recommend that you have yearly exams for glaucoma and other age-related eye problems starting at age 48. · Hearing. Tell your doctor if you notice any change in your hearing. You can have tests to find out how well you hear. · Diabetes. Ask your doctor whether you should have tests for diabetes. · Colon cancer. You should begin tests for colon cancer at age 48. You may have one of several tests. Your doctor will tell you how often to have tests based on your age and risk. Risks include whether you already had a precancerous polyp removed from your colon or whether your parents, sisters and brothers, or children have had colon cancer. · Thyroid disease. Talk to your doctor about whether to have your thyroid checked as part of a regular physical exam. Women have an increased chance of a thyroid problem. · Osteoporosis. You should begin tests for bone density at age 72. If you are younger than 72, ask your doctor whether you have factors that may increase your risk for this disease. You may want to have this test before age 72. · Heart attack and stroke risk. At least every 4 to 6 years, you should have your risk for heart attack and stroke assessed.  Your doctor uses factors such as your age, blood pressure, cholesterol, and whether you smoke or have diabetes to show what your risk for a heart attack or stroke is over the next 10 years. When should you call for help? Watch closely for changes in your health, and be sure to contact your doctor if you have any problems or symptoms that concern you. Where can you learn more? Go to http://yuni-michael.info/. Enter S265 in the search box to learn more about \"Well Visit, Women 50 to 72: Care Instructions. \"  Current as of: May 12, 2017  Content Version: 11.4  © 6192-6288 [employer]. Care instructions adapted under license by [employer] (which disclaims liability or warranty for this information). If you have questions about a medical condition or this instruction, always ask your healthcare professional. [address] any warranty or liability for your use of this information.

## 2018-04-04 LAB
ALBUMIN SERPL-MCNC: 4.3 G/DL (ref 3.5–5.5)
ALBUMIN/GLOB SERPL: 1.7 {RATIO} (ref 1.2–2.2)
ALP SERPL-CCNC: 66 IU/L (ref 39–117)
ALT SERPL-CCNC: 23 IU/L (ref 0–32)
AST SERPL-CCNC: 26 IU/L (ref 0–40)
BASOPHILS # BLD AUTO: 0 X10E3/UL (ref 0–0.2)
BASOPHILS NFR BLD AUTO: 0 %
BILIRUB SERPL-MCNC: 0.4 MG/DL (ref 0–1.2)
BUN SERPL-MCNC: 13 MG/DL (ref 6–24)
BUN/CREAT SERPL: 23 (ref 9–23)
CALCIUM SERPL-MCNC: 9.4 MG/DL (ref 8.7–10.2)
CHLORIDE SERPL-SCNC: 98 MMOL/L (ref 96–106)
CHOLEST SERPL-MCNC: 208 MG/DL (ref 100–199)
CO2 SERPL-SCNC: 23 MMOL/L (ref 18–29)
CREAT SERPL-MCNC: 0.57 MG/DL (ref 0.57–1)
EOSINOPHIL # BLD AUTO: 0.3 X10E3/UL (ref 0–0.4)
EOSINOPHIL NFR BLD AUTO: 5 %
ERYTHROCYTE [DISTWIDTH] IN BLOOD BY AUTOMATED COUNT: 14.1 % (ref 12.3–15.4)
EST. AVERAGE GLUCOSE BLD GHB EST-MCNC: 108 MG/DL
GFR SERPLBLD CREATININE-BSD FMLA CKD-EPI: 103 ML/MIN/1.73
GFR SERPLBLD CREATININE-BSD FMLA CKD-EPI: 119 ML/MIN/1.73
GLOBULIN SER CALC-MCNC: 2.6 G/DL (ref 1.5–4.5)
GLUCOSE SERPL-MCNC: 92 MG/DL (ref 65–99)
HBA1C MFR BLD: 5.4 % (ref 4.8–5.6)
HCT VFR BLD AUTO: 45.8 % (ref 34–46.6)
HCV AB S/CO SERPL IA: <0.1 S/CO RATIO (ref 0–0.9)
HDLC SERPL-MCNC: 88 MG/DL
HGB BLD-MCNC: 15.1 G/DL (ref 11.1–15.9)
IMM GRANULOCYTES # BLD: 0 X10E3/UL (ref 0–0.1)
IMM GRANULOCYTES NFR BLD: 0 %
INTERPRETATION, 910389: NORMAL
LDLC SERPL CALC-MCNC: 108 MG/DL (ref 0–99)
LYMPHOCYTES # BLD AUTO: 1.6 X10E3/UL (ref 0.7–3.1)
LYMPHOCYTES NFR BLD AUTO: 23 %
MCH RBC QN AUTO: 32.4 PG (ref 26.6–33)
MCHC RBC AUTO-ENTMCNC: 33 G/DL (ref 31.5–35.7)
MCV RBC AUTO: 98 FL (ref 79–97)
MONOCYTES # BLD AUTO: 0.5 X10E3/UL (ref 0.1–0.9)
MONOCYTES NFR BLD AUTO: 8 %
NEUTROPHILS # BLD AUTO: 4.3 X10E3/UL (ref 1.4–7)
NEUTROPHILS NFR BLD AUTO: 64 %
PLATELET # BLD AUTO: 336 X10E3/UL (ref 150–379)
POTASSIUM SERPL-SCNC: 4.3 MMOL/L (ref 3.5–5.2)
PROT SERPL-MCNC: 6.9 G/DL (ref 6–8.5)
RBC # BLD AUTO: 4.66 X10E6/UL (ref 3.77–5.28)
SODIUM SERPL-SCNC: 137 MMOL/L (ref 134–144)
TRIGL SERPL-MCNC: 62 MG/DL (ref 0–149)
VLDLC SERPL CALC-MCNC: 12 MG/DL (ref 5–40)
WBC # BLD AUTO: 6.7 X10E3/UL (ref 3.4–10.8)

## 2018-04-06 NOTE — PROGRESS NOTES
Please mail letter:     1. CBC, kidney, liver level is within normal range. 2. Bad cholesterol LDL level is very mildly elevated. No need to start any medication. Continue watching your diet, eat healthy, less serrano and fatty food, more baked or grilled or broiled food. Exercise 150 minutes/week will be helpful as well. Will recheck level in one year. 3. No diabetes. 4. Hep C is negative.

## 2018-04-14 ENCOUNTER — HOSPITAL ENCOUNTER (OUTPATIENT)
Dept: MAMMOGRAPHY | Age: 58
Discharge: HOME | End: 2018-04-14
Attending: FAMILY MEDICINE
Payer: COMMERCIAL

## 2018-04-14 DIAGNOSIS — Z12.39 SCREENING BREAST EXAMINATION: ICD-10-CM

## 2018-04-14 PROCEDURE — 77067 SCR MAMMO BI INCL CAD: CPT

## 2018-04-27 ENCOUNTER — HOSPITAL ENCOUNTER (OUTPATIENT)
Dept: MAMMOGRAPHY | Age: 58
Discharge: HOME | End: 2018-04-27
Attending: FAMILY MEDICINE
Payer: COMMERCIAL

## 2018-04-27 DIAGNOSIS — R92.8 ABNORMAL MAMMOGRAM: ICD-10-CM

## 2018-04-27 PROCEDURE — 77065 DX MAMMO INCL CAD UNI: CPT

## 2018-10-30 ENCOUNTER — OFFICE VISIT (OUTPATIENT)
Dept: PRIMARY CARE CLINIC | Age: 58
End: 2018-10-30

## 2018-10-30 ENCOUNTER — HOSPITAL ENCOUNTER (OUTPATIENT)
Dept: GENERAL RADIOLOGY | Age: 58
Discharge: HOME | End: 2018-10-30
Payer: COMMERCIAL

## 2018-10-30 ENCOUNTER — TELEPHONE (OUTPATIENT)
Dept: PRIMARY CARE CLINIC | Age: 58
End: 2018-10-30

## 2018-10-30 VITALS
RESPIRATION RATE: 19 BRPM | DIASTOLIC BLOOD PRESSURE: 69 MMHG | TEMPERATURE: 98.1 F | HEIGHT: 67 IN | SYSTOLIC BLOOD PRESSURE: 95 MMHG | OXYGEN SATURATION: 97 % | WEIGHT: 174.4 LBS | HEART RATE: 96 BPM | BODY MASS INDEX: 27.37 KG/M2

## 2018-10-30 DIAGNOSIS — M25.532 LEFT WRIST PAIN: ICD-10-CM

## 2018-10-30 DIAGNOSIS — Z23 ENCOUNTER FOR IMMUNIZATION: ICD-10-CM

## 2018-10-30 DIAGNOSIS — M25.532 LEFT WRIST PAIN: Primary | ICD-10-CM

## 2018-10-30 PROCEDURE — 73090 X-RAY EXAM OF FOREARM: CPT

## 2018-10-30 PROCEDURE — 73110 X-RAY EXAM OF WRIST: CPT

## 2018-10-30 RX ORDER — IBUPROFEN 800 MG/1
800 TABLET ORAL
Qty: 30 TAB | Refills: 0 | Status: SHIPPED | OUTPATIENT
Start: 2018-10-30 | End: 2020-02-21 | Stop reason: ALTCHOICE

## 2018-10-30 NOTE — PROGRESS NOTES
Please call patient:     Xray showed mild arthritis over the area she has been having pain. Continue the Ibuprofen, ice and rest. If symptoms not better then she can go to Ortho. Referral already provided to patient today.

## 2018-10-30 NOTE — TELEPHONE ENCOUNTER
----- Message from Faye Haynes MD sent at 10/30/2018  4:31 PM EDT -----  Please call patient:    Xray showed mild arthritis over the area she has been having pain. Continue the Ibuprofen, ice and rest. If symptoms not better then she can go to Ortho. Referral already provided to patient today.

## 2018-10-31 NOTE — PROGRESS NOTES
Subjective:     Chief Complaint   Patient presents with    Wrist Pain     [facility] was [facility] that she has cellulitis and then webmd told her she had thrombosis. Was prescribed ABX both times and nothing helped, still having pain. Has been having the pain [facility] emid september        She  is a 62y.o. year old female who presents today with a c/o pain and swelling in her left wrist joint for the past one month. She reports that [facility] where she was diagnosed with cellulitis . She was prescribed keflex for about ten days. Reports that swelling is down but the pain was still there so she consulted with a doctor through [id number] where she was prescribed Augmentin and steroid. Reports that she took the Augmentin just few days then she stopped due to GI side effect. She finished the 5 days of prednisone. She did not take NSAID. Pertinent items are noted in HPI. Objective:     Vitals:    10/30/18 1007   BP: 95/69   Pulse: 96   Resp: 19   Temp: 98.1 °F (36.7 °C)   TempSrc: Oral   SpO2: 97%   Weight: 174 lb 6.4 oz (79.1 kg)   Height: 5' 7\" (1.702 m)       Physical Examination: General appearance - alert, well appearing, and in no distress, oriented to person, place, and time and overweight  Mental status - alert, oriented to person, place, and time, normal mood, behavior, speech, dress, motor activity, and thought processes  Musculoskeletal - radial side of the wrist joint is slightly swollen. No redness noted. TTP. Wrist joint movement is restricted on flexion, eversion.      No Known Allergies   Social History     Socioeconomic History    Marital status: SINGLE     Spouse name: Not on file    Number of children: Not on file    Years of education: Not on file    Highest education level: Not on file   Social Needs    Financial resource strain: Not on file    Food insecurity - worry: Not on file    Food insecurity - inability: Not on file   [employer] needs - medical: Not on file   [employer] needs - non-medical: Not on file   Occupational History    Not on file   Tobacco Use    Smoking status: Current Some Day Smoker     Years: 30.00     Last attempt to quit: 2016     Years since quittin.7    Smokeless tobacco: Never Used   Substance and Sexual Activity    Alcohol use: Yes     Alcohol/week: 0.6 oz     Types: 1 Glasses of wine per week     Comment: weekends    Drug use: No    Sexual activity: Yes   Other Topics Concern    Not on file   Social History Narrative    Not on file      Family History   Problem Relation Age of Onset    Heart Disease Mother     Hypertension Mother     Diabetes Brother     Hypertension Brother       Past Surgical History:   Procedure Laterality Date    HX APPENDECTOMY      HX GYN      dysplasia      Past Medical History:   Diagnosis Date    Thyroid disease       Current Outpatient Medications   Medication Sig Dispense Refill    ibuprofen (MOTRIN) 800 mg tablet Take 1 Tab by mouth every eight (8) hours as needed for Pain. 30 Tab 0    levothyroxine (LEVOXYL) 125 mcg tablet Take  by mouth Daily (before breakfast).  doxycycline (PERIOSTAT) 20 mg tablet Take 20 mg by mouth two (2) times a day. Assessment/ Plan:   Diagnoses and all orders for this visit:    1. Left wrist pain  -     D/D is ganglion cyst, tendonitis, osteoarthritis. -     XR FOREARM LT AP/LAT; Future  - IMPRESSION:        1. No fracture of the left forearm or wrist.  2. Mild radiocarpal osteoarthritis. -     Start ibuprofen (MOTRIN) 800 mg tablet; Take 1 Tab by mouth every eight (8) hours as needed for Pain.  -      Rest, ice, wrist brace.   -     REFERRAL TO ORTHOPEDICS    2. Encounter for immunization  -     INFLUENZA VIRUS VAC QUAD,SPLIT,PRESV FREE SYRINGE IM           Medication risks/benefits/costs/interactions/alternatives discussed with patient.   Advised patient to call back or return to office if symptoms worsen/change/persist. If patient cannot reach us or should anything more severe/urgent arise he/she should proceed directly to the nearest emergency department. Discussed expected course/resolution/complications of diagnosis in detail with patient. Patient given a written after visit summary which includes her diagnoses, current medications and vitals. Patient expressed understanding with the diagnosis and plan. Follow-up Disposition:  Return if symptoms worsen or fail to improve.

## 2020-02-21 ENCOUNTER — OFFICE VISIT (OUTPATIENT)
Dept: PRIMARY CARE CLINIC | Age: 60
End: 2020-02-21

## 2020-02-21 VITALS
SYSTOLIC BLOOD PRESSURE: 102 MMHG | TEMPERATURE: 98.1 F | DIASTOLIC BLOOD PRESSURE: 67 MMHG | BODY MASS INDEX: 29.98 KG/M2 | RESPIRATION RATE: 17 BRPM | HEIGHT: 67 IN | HEART RATE: 85 BPM | OXYGEN SATURATION: 97 % | WEIGHT: 191 LBS

## 2020-02-21 DIAGNOSIS — M25.551 PAIN OF RIGHT HIP JOINT: ICD-10-CM

## 2020-02-21 DIAGNOSIS — E03.9 ACQUIRED HYPOTHYROIDISM: ICD-10-CM

## 2020-02-21 DIAGNOSIS — M16.10 HIP ARTHRITIS: ICD-10-CM

## 2020-02-21 DIAGNOSIS — Z00.00 WELL ADULT ON ROUTINE HEALTH CHECK: Primary | ICD-10-CM

## 2020-02-21 DIAGNOSIS — E55.9 VITAMIN D DEFICIENCY: ICD-10-CM

## 2020-02-21 DIAGNOSIS — Z23 ENCOUNTER FOR IMMUNIZATION: ICD-10-CM

## 2020-02-21 PROBLEM — Z87.891 FORMER CIGARETTE SMOKER: Status: ACTIVE | Noted: 2020-02-21

## 2020-02-21 RX ORDER — IBUPROFEN 200 MG
200 TABLET ORAL
COMMUNITY
End: 2020-06-26

## 2020-02-21 RX ORDER — LEVOTHYROXINE SODIUM 150 UG/1
150 CAPSULE ORAL
Status: ON HOLD | COMMUNITY
Start: 2020-02-02 | End: 2020-10-14

## 2020-02-21 RX ORDER — IBUPROFEN 800 MG/1
800 TABLET ORAL
Qty: 30 TAB | Refills: 1 | Status: SHIPPED | OUTPATIENT
Start: 2020-02-21 | End: 2020-06-26

## 2020-02-21 NOTE — PROGRESS NOTES
Subjective:   61 y.o. female for Well Woman Check. Her gyne and breast care is done elsewhere by her Ob-Gyne physician. Has been following up with Dr. Magnolia Lancaster for thyroid issue. Patient continue to have pain in her right hip. Reports that walking, standing is painful. MRI hip on 2017 showed. 1. Mild degenerative changes of the right hip and pubic symphysis  2. Tendinopathy of the bilateral hamstring origins    Patient Active Problem List   Diagnosis Code    S/P laparoscopic appendectomy Z90.49    Acquired hypothyroidism E03.9    Former cigarette smoker Z87.891     Current Outpatient Medications   Medication Sig Dispense Refill    levothyroxine (SYNTHROID) 137 mcg tablet       ibuprofen (MOTRIN) 200 mg tablet Take 200 mg by mouth every six (6) hours as needed for Pain.  ibuprofen (MOTRIN) 800 mg tablet Take 1 Tab by mouth every eight (8) hours as needed for Pain. 30 Tab 1    doxycycline (PERIOSTAT) 20 mg tablet Take 20 mg by mouth two (2) times a day. No Known Allergies  Past Medical History:   Diagnosis Date    Thyroid disease      Past Surgical History:   Procedure Laterality Date    HX APPENDECTOMY      HX GYN      dysplasia     Family History   Problem Relation Age of Onset    Heart Disease Mother     Hypertension Mother     Diabetes Brother     Hypertension Brother      Social History     Tobacco Use    Smoking status: Current Some Day Smoker     Years: 30.00     Last attempt to quit: 2016     Years since quittin.0    Smokeless tobacco: Never Used   Substance Use Topics    Alcohol use: Yes     Alcohol/week: 1.0 standard drinks     Types: 1 Glasses of wine per week     Comment: weekends             ROS: Feeling generally well. No TIA's or unusual headaches, no dysphagia. No prolonged cough. No dyspnea or chest pain on exertion. No abdominal pain, change in bowel habits, black or bloody stools. No urinary tract symptoms.   No new or unusual musculoskeletal symptoms except HPI. Specific concerns today: Refer to HPI. Objective: The patient appears well, alert, oriented x 3, in no distress. Visit Vitals  /67 (BP 1 Location: Right arm, BP Patient Position: Sitting)   Pulse 85   Temp 98.1 °F (36.7 °C) (Oral)   Resp 17   Ht 5' 7\" (1.702 m)   Wt 191 lb (86.6 kg)   SpO2 97%   BMI 29.91 kg/m²     ENT normal.  Neck supple. No adenopathy or thyromegaly. JULIANE. Lungs are clear, good air entry, no wheezes, rhonchi or rales. S1 and S2 normal, no murmurs, regular rate and rhythm. Abdomen soft without tenderness, guarding, mass or organomegaly. Extremities show no edema, normal peripheral pulses. Neurological is normal, no focal findings. Right hip: flexion, external rotation of right him was painful. Breast and Pelvic exams are deferred. Assessment/Plan:   Well Woman  lose weight, increase physical activity, follow low fat diet, follow low salt diet, continue present plan, routine labs ordered, call if any problems    ICD-10-CM ICD-9-CM    1. Well adult on routine health check Z00.00 V70.0 LIPID PANEL      METABOLIC PANEL, COMPREHENSIVE      HEMOGLOBIN A1C WITH EAG      CBC WITH AUTOMATED DIFF   2. Hip arthritis M16.10 716.95 REFERRAL TO ORTHOPEDICS      VITAMIN D, 25 HYDROXY      ibuprofen (MOTRIN) 800 mg tablet   3. Acquired hypothyroidism E03.9 244.9    4. Vitamin D deficiency E55.9 268.9 VITAMIN D, 25 HYDROXY   5. Pain of right hip joint M25.551 719.45 ibuprofen (MOTRIN) 800 mg tablet   6. Encounter for immunization Z23 V03.89 PNEUMOCOCCAL POLYSACCHARIDE VACCINE, 23-VALENT, ADULT OR IMMUNOSUPPRESSED PT DOSE,     Diagnoses and all orders for this visit:    1. Well adult visit  -     LIPID PANEL  -     METABOLIC PANEL, COMPREHENSIVE  -     HEMOGLOBIN A1C WITH EAG  -     CBC WITH AUTOMATED DIFF    2. Hip arthritis  -     REFERRAL TO ORTHOPEDICS  -     VITAMIN D, 25 HYDROXY  -     ibuprofen (MOTRIN) 800 mg tablet;  Take 1 Tab by mouth every eight (8) hours as needed for Pain. 3. Acquired hypothyroidism       - per endocrinologist.  4. Vitamin D deficiency  -     VITAMIN D, 25 HYDROXY                 5. Pain of right hip joint  -    Take as needed ibuprofen (MOTRIN) 800 mg tablet; Take 1 Tab by mouth every eight (8) hours as needed for Pain. 6. Encounter for immunization  -     PNEUMOCOCCAL POLYSACCHARIDE VACCINE, 23-VALENT, ADULT OR IMMUNOSUPPRESSED PT DOSE,      Advised to get shingle vaccine from local pharmacy. Follow-up and Dispositions    · Return in about 1 year (around 2/21/2021), or if symptoms worsen or fail to improve.        current treatment plan is effective, no change in therapy  reviewed diet, exercise and weight control  reviewed medications and side effects in detail

## 2020-02-26 LAB
25(OH)D3+25(OH)D2 SERPL-MCNC: 31.7 NG/ML (ref 30–100)
ALBUMIN SERPL-MCNC: 4 G/DL (ref 3.8–4.9)
ALBUMIN/GLOB SERPL: 1.5 {RATIO} (ref 1.2–2.2)
ALP SERPL-CCNC: 68 IU/L (ref 39–117)
ALT SERPL-CCNC: 13 IU/L (ref 0–32)
AST SERPL-CCNC: 19 IU/L (ref 0–40)
BASOPHILS # BLD AUTO: 0.1 X10E3/UL (ref 0–0.2)
BASOPHILS NFR BLD AUTO: 1 %
BILIRUB SERPL-MCNC: 0.4 MG/DL (ref 0–1.2)
BUN SERPL-MCNC: 17 MG/DL (ref 6–24)
BUN/CREAT SERPL: 33 (ref 9–23)
CALCIUM SERPL-MCNC: 9.3 MG/DL (ref 8.7–10.2)
CHLORIDE SERPL-SCNC: 104 MMOL/L (ref 96–106)
CHOLEST SERPL-MCNC: 180 MG/DL (ref 100–199)
CO2 SERPL-SCNC: 22 MMOL/L (ref 20–29)
CREAT SERPL-MCNC: 0.52 MG/DL (ref 0.57–1)
EOSINOPHIL # BLD AUTO: 0.4 X10E3/UL (ref 0–0.4)
EOSINOPHIL NFR BLD AUTO: 6 %
ERYTHROCYTE [DISTWIDTH] IN BLOOD BY AUTOMATED COUNT: 12.3 % (ref 11.7–15.4)
EST. AVERAGE GLUCOSE BLD GHB EST-MCNC: 111 MG/DL
GLOBULIN SER CALC-MCNC: 2.6 G/DL (ref 1.5–4.5)
GLUCOSE SERPL-MCNC: 84 MG/DL (ref 65–99)
HBA1C MFR BLD: 5.5 % (ref 4.8–5.6)
HCT VFR BLD AUTO: 40.5 % (ref 34–46.6)
HDLC SERPL-MCNC: 69 MG/DL
HGB BLD-MCNC: 14.4 G/DL (ref 11.1–15.9)
IMM GRANULOCYTES # BLD AUTO: 0 X10E3/UL (ref 0–0.1)
IMM GRANULOCYTES NFR BLD AUTO: 0 %
LDLC SERPL CALC-MCNC: 100 MG/DL (ref 0–99)
LYMPHOCYTES # BLD AUTO: 1.5 X10E3/UL (ref 0.7–3.1)
LYMPHOCYTES NFR BLD AUTO: 23 %
MCH RBC QN AUTO: 34.1 PG (ref 26.6–33)
MCHC RBC AUTO-ENTMCNC: 35.6 G/DL (ref 31.5–35.7)
MCV RBC AUTO: 96 FL (ref 79–97)
MONOCYTES # BLD AUTO: 0.5 X10E3/UL (ref 0.1–0.9)
MONOCYTES NFR BLD AUTO: 7 %
NEUTROPHILS # BLD AUTO: 4.3 X10E3/UL (ref 1.4–7)
NEUTROPHILS NFR BLD AUTO: 63 %
PLATELET # BLD AUTO: 269 X10E3/UL (ref 150–450)
POTASSIUM SERPL-SCNC: 4.4 MMOL/L (ref 3.5–5.2)
PROT SERPL-MCNC: 6.6 G/DL (ref 6–8.5)
RBC # BLD AUTO: 4.22 X10E6/UL (ref 3.77–5.28)
SODIUM SERPL-SCNC: 141 MMOL/L (ref 134–144)
TRIGL SERPL-MCNC: 53 MG/DL (ref 0–149)
VLDLC SERPL CALC-MCNC: 11 MG/DL (ref 5–40)
WBC # BLD AUTO: 6.8 X10E3/UL (ref 3.4–10.8)

## 2020-02-27 NOTE — PROGRESS NOTES
Please mail letter:    1. CBC, kidney and liver function is normal.    2. Vit D level is normal.    3. Average blood sugar level is normal. No diabetes. 4. Cholesterol level is better than last time, slightly elevated from normal range but stable. Continue maintain healthy lifestyle.

## 2020-03-18 ENCOUNTER — TELEPHONE (OUTPATIENT)
Dept: PRIMARY CARE CLINIC | Age: 60
End: 2020-03-18

## 2020-03-18 ENCOUNTER — OFFICE VISIT (OUTPATIENT)
Dept: PRIMARY CARE CLINIC | Age: 60
End: 2020-03-18

## 2020-03-18 VITALS
HEART RATE: 94 BPM | HEIGHT: 67 IN | OXYGEN SATURATION: 97 % | RESPIRATION RATE: 17 BRPM | WEIGHT: 198.4 LBS | SYSTOLIC BLOOD PRESSURE: 110 MMHG | BODY MASS INDEX: 31.14 KG/M2 | TEMPERATURE: 97.8 F | DIASTOLIC BLOOD PRESSURE: 76 MMHG

## 2020-03-18 DIAGNOSIS — R00.0 HEART RATE FAST: Primary | ICD-10-CM

## 2020-03-18 DIAGNOSIS — I45.10 RIGHT BUNDLE BRANCH BLOCK (RBBB) DETERMINED BY ELECTROCARDIOGRAPHY: ICD-10-CM

## 2020-03-18 DIAGNOSIS — I49.3 ECTOPIC BEAT, VENTRICULAR: ICD-10-CM

## 2020-03-18 NOTE — TELEPHONE ENCOUNTER
Patient states that for 1 week, her heart rate has been elevated. States that it normally happens in the middles of the night. This morning @ 4 am, she woke up with SOB and chest pain with a HR of 121. At this time it is 96 and O2 is 98%. Would like to know what she should do.

## 2020-03-18 NOTE — PROGRESS NOTES
Subjective:     Chief Complaint   Patient presents with    Irregular Heart Beat     x 1wk, mainly in the middle of the night. At 4 am it was 121    Shortness of Breath     denies chest pain        She  is a 61y.o. year old female with hx of hypothyroid and  no significant cardiac or lungs history who present today with a concern about rapid heart beat X 1 week. Reports that for the past one week she has been waking up in the middle of the night with racing heart. During the day time she feels okay other than slight CLAROS but no chest pain, cough, sweating. Denies any anxiety. Endocrinologist been managing thyroid level and per patient levels been normal.          Pertinent items are noted in HPI. Objective:     Vitals:    20 1131   BP: 110/76   Pulse: 94   Resp: 17   Temp: 97.8 °F (36.6 °C)   TempSrc: Oral   SpO2: 97%   Weight: 198 lb 6.4 oz (90 kg)   Height: 5' 7\" (1.702 m)       Physical Examination: General appearance - alert, well appearing, and in no distress, oriented to person, place, and time and overweight  Mental status - alert, oriented to person, place, and time, normal mood, behavior, speech, dress, motor activity, and thought processes  Chest - clear to auscultation, no wheezes, rales or rhonchi, symmetric air entry  Heart - tachycardia, regular rhythm, normal S1, S2, no murmurs, rubs, clicks or gallops. No Known Allergies   Social History     Socioeconomic History    Marital status: SINGLE     Spouse name: Not on file    Number of children: Not on file    Years of education: Not on file    Highest education level: Not on file   Tobacco Use    Smoking status: Current Some Day Smoker     Years: 30.00     Last attempt to quit: 2016     Years since quittin.1    Smokeless tobacco: Never Used   Substance and Sexual Activity    Alcohol use:  Yes     Alcohol/week: 1.0 standard drinks     Types: 1 Glasses of wine per week     Comment: weekends    Drug use: No    Sexual activity: Yes      Family History   Problem Relation Age of Onset    Heart Disease Mother     Hypertension Mother     Diabetes Brother     Hypertension Brother       Past Surgical History:   Procedure Laterality Date    HX APPENDECTOMY      HX GYN      dysplasia      Past Medical History:   Diagnosis Date    Thyroid disease       Current Outpatient Medications   Medication Sig Dispense Refill    levothyroxine (SYNTHROID) 137 mcg tablet Take 137 mcg by mouth Daily (before breakfast).  ibuprofen (MOTRIN) 200 mg tablet Take 200 mg by mouth every six (6) hours as needed for Pain.  doxycycline (PERIOSTAT) 20 mg tablet Take 20 mg by mouth two (2) times a day.  ibuprofen (MOTRIN) 800 mg tablet Take 1 Tab by mouth every eight (8) hours as needed for Pain. 30 Tab 1        Assessment/ Plan:   Diagnoses and all orders for this visit:    1. Heart rate fast  -     AMB POC EKG ROUTINE W/ 12 LEADS, INTER & REP- showed RBBB and ectopic heart beat. Sinus rhythm. Made and appointment with cardiologist Dr. Mj Siddiqui for tomorrow. Provided contact info to patient before she left office. Strongly advised patient to got to ER if anything gets worsen in the mean time. 2. Ectopic beat, ventricular         Same as #1  3. Right bundle branch block (RBBB) determined by electrocardiography      Same as #1         Medication risks/benefits/costs/interactions/alternatives discussed with patient. Advised patient to call back or return to office if symptoms worsen/change/persist. If patient cannot reach us or should anything more severe/urgent arise he/she should proceed directly to the nearest emergency department. Discussed expected course/resolution/complications of diagnosis in detail with patient. Patient given a written after visit summary which includes her diagnoses, current medications and vitals. Patient expressed understanding with the diagnosis and plan.           Follow-up and Dispositions · Return if symptoms worsen or fail to improve, for referred to cardiologist.

## 2020-03-30 ENCOUNTER — VIRTUAL VISIT (OUTPATIENT)
Dept: ORTHOPEDIC SURGERY | Age: 60
End: 2020-03-30

## 2020-03-30 ENCOUNTER — HOSPITAL ENCOUNTER (OUTPATIENT)
Dept: GENERAL RADIOLOGY | Age: 60
Discharge: HOME | End: 2020-03-30
Payer: COMMERCIAL

## 2020-03-30 DIAGNOSIS — M16.0 BILATERAL PRIMARY OSTEOARTHRITIS OF HIP: Primary | ICD-10-CM

## 2020-03-30 DIAGNOSIS — M25.551 RIGHT HIP PAIN: ICD-10-CM

## 2020-03-30 DIAGNOSIS — M25.551 RIGHT HIP PAIN: Primary | ICD-10-CM

## 2020-03-30 PROCEDURE — 73502 X-RAY EXAM HIP UNI 2-3 VIEWS: CPT

## 2020-03-30 NOTE — PROGRESS NOTES
3/30/2020    Chief Complaint: Right hip pain    HPI: This is a 61 y.o. female who complains of right hip pain which is activity dependent. The patient has had activity dependent pain for several months. The patient has tried activity modification, no physical therapy, injections have not been attempted. The pain is in the groin and laerally, it is severe in intensity. The pain is in the groin and causes some limitation in the normal activities of daily living. The patient denies any numbness, weakness, tingling, fevers, chills, nausea, vomiting, fevers, chills, nausea, vomiting. Past Medical History:   Diagnosis Date    Thyroid disease        Past Surgical History:   Procedure Laterality Date    HX APPENDECTOMY      HX GYN      dysplasia       Current Outpatient Medications on File Prior to Visit   Medication Sig Dispense Refill    levothyroxine (SYNTHROID) 137 mcg tablet Take 137 mcg by mouth Daily (before breakfast).  ibuprofen (MOTRIN) 200 mg tablet Take 200 mg by mouth every six (6) hours as needed for Pain.  ibuprofen (MOTRIN) 800 mg tablet Take 1 Tab by mouth every eight (8) hours as needed for Pain. 30 Tab 1    doxycycline (PERIOSTAT) 20 mg tablet Take 20 mg by mouth two (2) times a day. No current facility-administered medications on file prior to visit. No Known Allergies    Family History   Problem Relation Age of Onset    Heart Disease Mother     Hypertension Mother     Diabetes Brother     Hypertension Brother        Social History     Socioeconomic History    Marital status: SINGLE     Spouse name: Not on file    Number of children: Not on file    Years of education: Not on file    Highest education level: Not on file   Tobacco Use    Smoking status: Current Some Day Smoker     Years: 30.00     Last attempt to quit: 2016     Years since quittin.1    Smokeless tobacco: Never Used   Substance and Sexual Activity    Alcohol use:  Yes Alcohol/week: 1.0 standard drinks     Types: 1 Glasses of wine per week     Comment: weekends    Drug use: No    Sexual activity: Yes         Review of Systems:       General: Denies headache, lethargy, fever, weight loss  Ears/Nose/Throat: Denies ear discharge, drainage, nosebleeds, hoarse voice, dental problems  Cardiovascular: Denies chest pain, shortness of breath  Lungs: Denies chest pain, breathing problems, wheezing, pneumonia  Stomach: Denies stomach pain, heartburn, constipation, irritable bowel  Skin: Denies rash, sores, open wounds  Musculoskeletal: Admits to joint pain and swelling, this pain is severe and causes difficulty walking. This pain is in the groin on the right side, it is , made better by rest.   There is decreased mobility, and some difficulty doing normal activities of daily living secondary to the pain. Genitourinary: Denies dysuria, hematuria, polyuria  Gastrointestinal: Denies constipation, obstipation, diarrhea  Neurological: Denies changes in sight, smell, hearing, taste, seizures. Denies loss of consciousness. Psychiatric: Denies depression, sleep pattern changes, anxiety, change in personality  Endocrine: Denies mood swings, heat or cold intolerance  Hematologic/Lymphatic: Denies anemia, purpura, petechia  Allergic/Immunologic: Denies swelling of throat, pain or swelling at lymph nodes      Physical Examination:      General: AOX3, no apparent distress  Psychiatric: mood and affect appropriate        No exam was performed, as this was a telemedicine visit. Diagnostics:    Pertinent Xrays: Xrays are available of the bilateral hip, they indicate moderate right hip and mild to moderate left hip osteoarthritis of the femoroacetabular joints, no significant other findings, no other osseus abnormalities, fractures, or dislocations. Mild OA of lumbar spine noted        Assessment: Unilateral Primary Osteoarthritis, Right Hip    Plan:   This patient does have symptomatic hip osteoarthritis. We did discuss the general management of osteoarthritis, which is largely nonsurgical.   We discussed that activity modification, weight loss, weight bearing exercises, physical therapy, over the counter medications, prescription medications, ambulatory aids, intra-articular injections, and pain management modalities are the treatment of choice. Only once these fail, would we consider surgery. The patient stated their understanding of the pathology, as well as their choices. We decided to proceed with an injection at her convenience      Follow up will be for injection    This was a phone visit  Total time on phone was 19 minutes  Patient location: personal home  Physician location: home office  Patient was made aware of billing of visits. Ms. Tian Harkins has a reminder for a \"due or due soon\" health maintenance. I have asked that she contact her primary care provider for follow-up on this health maintenance.

## 2020-04-15 ENCOUNTER — OFFICE VISIT (OUTPATIENT)
Dept: ORTHOPEDIC SURGERY | Age: 60
End: 2020-04-15

## 2020-04-15 VITALS
HEIGHT: 67 IN | DIASTOLIC BLOOD PRESSURE: 69 MMHG | OXYGEN SATURATION: 98 % | WEIGHT: 200 LBS | SYSTOLIC BLOOD PRESSURE: 100 MMHG | TEMPERATURE: 97.6 F | HEART RATE: 76 BPM | BODY MASS INDEX: 31.39 KG/M2

## 2020-04-15 DIAGNOSIS — M16.0 BILATERAL PRIMARY OSTEOARTHRITIS OF HIP: Primary | ICD-10-CM

## 2020-04-15 PROBLEM — I50.21 ACUTE SYSTOLIC CONGESTIVE HEART FAILURE (HCC): Status: ACTIVE | Noted: 2020-04-15

## 2020-04-15 PROBLEM — I42.8 CARDIOMYOPATHY, NONISCHEMIC (HCC): Status: ACTIVE | Noted: 2020-04-15

## 2020-04-15 RX ORDER — FUROSEMIDE 40 MG/1
TABLET ORAL
COMMUNITY
Start: 2020-03-23

## 2020-04-15 RX ORDER — METOPROLOL SUCCINATE 25 MG/1
TABLET, EXTENDED RELEASE ORAL
COMMUNITY
Start: 2020-03-23

## 2020-04-15 RX ORDER — LIDOCAINE HYDROCHLORIDE 10 MG/ML
5 INJECTION INFILTRATION; PERINEURAL ONCE
Qty: 1 VIAL | Refills: 0
Start: 2020-04-15 | End: 2020-04-15

## 2020-04-15 RX ORDER — SACUBITRIL AND VALSARTAN 24; 26 MG/1; MG/1
TABLET, FILM COATED ORAL
COMMUNITY
Start: 2020-04-07

## 2020-04-15 NOTE — PROGRESS NOTES
PROCEDURE NOTE:    After consent was obtained, the right hip was visualized under direct ultrasound guidance, once I had confirmation of direct visualization of the head neck junction, skin at the anterior lateral hip was prepped with chlorhexidine. Under direct visualization, 1% lidocaine was injected into the subcutaneous tissues as well as into the capsule of the hip. Needle remained in place 6 mg of betamethasone as well as 1% lidocaine were injected into the hip joint itself, there was good filling of the capsule itself as noted by direct visualization. Images were saved. Once this was completed, the needle was extracted, sterile dressing was applied. The patient tolerated well. Postinjection instructions were given. Follow up in 1 week for phone call.

## 2020-04-15 NOTE — PROGRESS NOTES
Identified pt with two pt identifiers (name and ). Reviewed chart in preparation for visit and have obtained necessary documentation. Carlton Xie is a 61 y.o. female  Chief Complaint   Patient presents with    Joint Or Tendon Injection     RT hip     Visit Vitals  /69 (BP 1 Location: Right arm, BP Patient Position: Sitting)   Pulse 76   Temp 97.6 °F (36.4 °C) (Oral)   Ht 5' 7\" (1.702 m)   Wt 200 lb (90.7 kg)   SpO2 98%   BMI 31.32 kg/m²     1. Have you been to the ER, urgent care clinic since your last visit? Hospitalized since your last visit? No    2. Have you seen or consulted any other health care providers outside of the [address] [place] [address] since your last visit? Include any pap smears or colon screening. No     Planned procedure and potential risks reviewed with pt. She expresses understanding and verbally consents to proceed.

## 2020-04-19 ENCOUNTER — DOCUMENTATION ONLY (OUTPATIENT)
Dept: ORTHOPEDIC SURGERY | Age: 60
End: 2020-04-19

## 2020-04-19 NOTE — PROGRESS NOTES
Patient confirmed that the demographics listed in [place] were correct. They verbally consented to being treated in the office today. They were also made aware that any service performed in the office will be billed. They are aware that they are financially responsible for any balances not covered by their insurance company. They were also provided a copy of the HIPAA Notice of Privacy Practices.

## 2020-04-22 ENCOUNTER — VIRTUAL VISIT (OUTPATIENT)
Dept: ORTHOPEDIC SURGERY | Age: 60
End: 2020-04-22

## 2020-04-22 DIAGNOSIS — M16.0 BILATERAL PRIMARY OSTEOARTHRITIS OF HIP: Primary | ICD-10-CM

## 2020-04-22 NOTE — PROGRESS NOTES
2020      CC: Right hip pain    HPI:      This is a 61y.o. year old female who presents for a follow up visit. The patient was last seen and diagnosed with osteoarthritis, right hip. The patient's treatments since the most recent visit have comprised of intra-articular injection last week. The patient has had 50% relief of the chief complaint. She covers the rest of her pain with ibuprofen. PMH:  Past Medical History:   Diagnosis Date    Thyroid disease        PSxHx:  Past Surgical History:   Procedure Laterality Date    HX APPENDECTOMY      HX GYN      dysplasia       Meds:    Current Outpatient Medications:     furosemide (LASIX) 40 mg tablet, TAKE 1 TABLET BY MOUTH EVERY DAY, Disp: , Rfl:     metoprolol succinate (TOPROL-XL) 25 mg XL tablet, TAKE 1 TABLET BY MOUTH EVERY DAY, Disp: , Rfl:     Entresto 24-26 mg tablet, TAKE 1 TABLET BY MOUTH TWICE A DAY, Disp: , Rfl:     levothyroxine (SYNTHROID) 137 mcg tablet, Take 137 mcg by mouth Daily (before breakfast). , Disp: , Rfl:     ibuprofen (MOTRIN) 200 mg tablet, Take 200 mg by mouth every six (6) hours as needed for Pain., Disp: , Rfl:     ibuprofen (MOTRIN) 800 mg tablet, Take 1 Tab by mouth every eight (8) hours as needed for Pain., Disp: 30 Tab, Rfl: 1    doxycycline (PERIOSTAT) 20 mg tablet, Take 20 mg by mouth two (2) times a day., Disp: , Rfl:     All:  No Known Allergies    Social Hx:  Social History     Socioeconomic History    Marital status: SINGLE     Spouse name: Not on file    Number of children: Not on file    Years of education: Not on file    Highest education level: Not on file   Tobacco Use    Smoking status: Current Some Day Smoker     Years: 30.00     Last attempt to quit: 2016     Years since quittin.2    Smokeless tobacco: Never Used   Substance and Sexual Activity    Alcohol use:  Yes     Alcohol/week: 1.0 standard drinks     Types: 1 Glasses of wine per week     Comment: weekends    Drug use: No    Sexual activity: Yes       Family Hx:  Family History   Problem Relation Age of Onset    Heart Disease Mother     Hypertension Mother     Diabetes Brother     Hypertension Brother          Review of Systems:       General: Denies headache, lethargy, fever, weight loss  Ears/Nose/Throat: Denies ear discharge, drainage, nosebleeds, hoarse voice, dental problems  Cardiovascular: Denies chest pain, shortness of breath  Lungs: Denies chest pain, breathing problems, wheezing, pneumonia  Stomach: Denies stomach pain, heartburn, constipation, irritable bowel  Skin: Denies rash, sores, open wounds  Musculoskeletal: Right hip pain  Genitourinary: Denies dysuria, hematuria, polyuria  Gastrointestinal: Denies constipation, obstipation, diarrhea  Neurological: Denies changes in sight, smell, hearing, taste, seizures. Denies loss of consciousness. Psychiatric: Denies depression, sleep pattern changes, anxiety, change in personality  Endocrine: Denies mood swings, heat or cold intolerance  Hematologic/Lymphatic: Denies anemia, purpura, petechia  Allergic/Immunologic: Denies swelling of throat, pain or swelling at lymph nodes      Physical Examination:    There were no vitals taken for this visit. General: AOX3, no apparent distress  Psychiatric: mood and affect appropriate      Diagnostics:    Pertinent Diagnostics: None today    Assessment: Bilateral primary osteoarthritis of the hip, right is more symptomatic  Plan: This patient has had a successful injection into her hip, she has no further complaints at this time, she feels as though this is moderately helping and she is functional at this point. Plan would be to proceed with continued conservative treatment of her hip arthritis, I would plan on following up with her on an as-needed basis, she is aware that the only definitive treatment would be total hip arthroplasty, but she would need to fail all nonoperative management before that.   She stated her understanding and satisfaction. Follow-up will be as needed per her. Telemedicine modality: Video  Location of patient: home  Location of physician: office  Time spent in consultation: 10 minutes  The patient has been made aware of the billing practices of telemedicine. Ms. Rangel Motley has a reminder for a \"due or due soon\" health maintenance. I have asked that she contact her primary care provider for follow-up on this health maintenance.

## 2020-05-08 ENCOUNTER — TELEPHONE (OUTPATIENT)
Dept: ORTHOPEDIC SURGERY | Age: 60
End: 2020-05-08

## 2020-05-08 DIAGNOSIS — M16.0 BILATERAL PRIMARY OSTEOARTHRITIS OF HIP: Primary | ICD-10-CM

## 2020-05-08 RX ORDER — MELOXICAM 15 MG/1
15 TABLET ORAL DAILY
Qty: 60 TAB | Refills: 1 | Status: SHIPPED | OUTPATIENT
Start: 2020-05-08 | End: 2020-07-07

## 2020-05-08 NOTE — TELEPHONE ENCOUNTER
Pt states she has been having knee pain at night. Would like to know if we can send something to her pharmacy to help. States she wants to make us aware that she is currently taking medications for CHF as well Confirmed [facility] at [facility].

## 2020-05-19 ENCOUNTER — TELEPHONE (OUTPATIENT)
Dept: ORTHOPEDIC SURGERY | Age: 60
End: 2020-05-19

## 2020-05-19 NOTE — TELEPHONE ENCOUNTER
Pt states she would like to proceed with hip replacement. Did you need to see her in the office again? Or did you just want to send the posting.  Just let me know

## 2020-05-20 NOTE — TELEPHONE ENCOUNTER
Called pt. Advised that posting has been sent and we would schedule as soon as Dr. Trina Sandoval was operating again. Pt agreed states she would prefer around July 6th. She will also call her PCP and Cardiology to set up pre-op appt.

## 2020-06-26 ENCOUNTER — OFFICE VISIT (OUTPATIENT)
Dept: PRIMARY CARE CLINIC | Age: 60
End: 2020-06-26

## 2020-06-26 VITALS
DIASTOLIC BLOOD PRESSURE: 55 MMHG | HEART RATE: 79 BPM | RESPIRATION RATE: 16 BRPM | HEIGHT: 67 IN | OXYGEN SATURATION: 97 % | TEMPERATURE: 97.6 F | WEIGHT: 207.8 LBS | SYSTOLIC BLOOD PRESSURE: 84 MMHG | BODY MASS INDEX: 32.62 KG/M2

## 2020-06-26 DIAGNOSIS — E03.9 ACQUIRED HYPOTHYROIDISM: ICD-10-CM

## 2020-06-26 DIAGNOSIS — Z12.39 SCREENING FOR BREAST CANCER: ICD-10-CM

## 2020-06-26 DIAGNOSIS — I42.8 CARDIOMYOPATHY, NONISCHEMIC (HCC): ICD-10-CM

## 2020-06-26 DIAGNOSIS — M25.551 RIGHT HIP PAIN: ICD-10-CM

## 2020-06-26 DIAGNOSIS — I50.22 SYSTOLIC CHF, CHRONIC (HCC): ICD-10-CM

## 2020-06-26 DIAGNOSIS — Z01.818 PREOPERATIVE CLEARANCE: Primary | ICD-10-CM

## 2020-06-26 NOTE — PROGRESS NOTES
Chief Complaint   Patient presents with    Pre-op Exam     hip surgery on monday july 6 patient does not have pre op paper work she has a pre op with the hospital

## 2020-06-26 NOTE — PROGRESS NOTES
Preoperative Evaluation    Date of Exam: 2020    Augusto Ramirez is a 61 y.o. female (:1960) who presents for preoperative evaluation. Patient already had her clearance from her cardiologist.   Reports that she has been feeling great since Entresto was added. Breathing is lot better. Denies any dizziness, chest pain, soa, cough. She quit smoking. Procedure/Surgery: Right Hip arthroplasty. Date of Procedure/Surgery: 2020  Surgeon: Dr. Jw Saldaña. Hospital/Surgical Facility: [facility] [place]  Primary Physician: Klaus Osorio MD  Latex Allergy: no    Problem List:     Patient Active Problem List    Diagnosis Date Noted    Systolic CHF, chronic ([facility] [address] 2020    Cardiomyopathy, nonischemic ([facility] [address]     Acute systolic congestive heart failure ([facility] [address] 04/15/2020    Bilateral primary osteoarthritis of hip 2020    Former cigarette smoker 2020    Acquired hypothyroidism 2018    S/P laparoscopic appendectomy 2013     Medical History:     Past Medical History:   Diagnosis Date    Thyroid disease      Allergies:   No Known Allergies   Medications:     Current Outpatient Medications   Medication Sig    furosemide (LASIX) 40 mg tablet TAKE 1 TABLET BY MOUTH EVERY DAY    metoprolol succinate (TOPROL-XL) 25 mg XL tablet TAKE 1 TABLET BY MOUTH EVERY DAY    Entresto 24-26 mg tablet TAKE 1 TABLET BY MOUTH TWICE A DAY    Levothyroxine 150 mcg cap Take 150 mcg by mouth Daily (before breakfast).  meloxicam (MOBIC) 15 mg tablet Take 1 Tab by mouth daily. No current facility-administered medications for this visit.       Surgical History:     Past Surgical History:   Procedure Laterality Date    HX APPENDECTOMY      HX GYN      dysplasia     Social History:     Social History     Socioeconomic History    Marital status: SINGLE     Spouse name: Not on file    Number of children: Not on file    Years of education: Not on file    Highest education level: Not on file   Tobacco Use    Smoking status: Current Some Day Smoker     Years: 30.00     Last attempt to quit: 2016     Years since quittin.4    Smokeless tobacco: Never Used   Substance and Sexual Activity    Alcohol use: Yes     Alcohol/week: 1.0 standard drinks     Types: 1 Glasses of wine per week     Comment: weekends    Drug use: No    Sexual activity: Yes       Recent use of: No recent use of aspirin (ASA), NSAIDS or steroids    Tetanus up to date: she will get her tetanus shot today or tomorrow in pharmacy. Anesthesia Complications: None  History of abnormal bleeding : None  History of Blood Transfusions: no  Health Care Directive or Living Will: no. Provided the form and discussed about the living will. REVIEW OF SYSTEMS:  Constitutional: negative for fevers and chills  Ears, nose, mouth, throat, and face: negative for earaches and sore throat  Respiratory: negative for cough  Cardiovascular: negative for chest pain, chest pressure/discomfort  Gastrointestinal: negative for nausea and vomiting  Musculoskeletal:positive for hip pain.   Neurological: negative for headaches, dizziness, vertigo and paresthesia  Behavioral/Psych: negative for anxiety and depression    EXAM:   Visit Vitals  BP (!) 84/55 (BP 1 Location: Left arm, BP Patient Position: Sitting)   Pulse 79   Temp 97.6 °F (36.4 °C) (Oral)   Resp 16   Ht 5' 7\" (1.702 m)   Wt 207 lb 12.8 oz (94.3 kg)   SpO2 97%   BMI 32.55 kg/m²     General appearance - alert, well appearing, and in no distress, oriented to person, place, and time and overweight  Mental status - alert, oriented to person, place, and time, normal mood, behavior, speech, dress, motor activity, and thought processes  Ears - bilateral TM's and external ear canals normal  Nose - normal and patent, no erythema, discharge or polyps  Mouth - mucous membranes moist, pharynx normal without lesions  Neck - supple, no significant adenopathy  Chest - clear to auscultation, no wheezes, rales or rhonchi, symmetric air entry  Heart - normal rate, regular rhythm, normal S1, S2, no murmurs, rubs, clicks or gallops  Abdomen - soft, nontender, nondistended, no masses or organomegaly  Neurological - alert, oriented, normal speech, no focal findings or movement disorder noted  Musculoskeletal - restricted ROM of right hip. Extremities - no pedal edema noted      DIAGNOSTICS:   1. EKG: EKG FINDINGS - Not required. Patient received clearance form her cardiologist.   2. CXR: Not required. 3. Labs:   Lab Results   Component Value Date/Time    WBC 5.2 06/26/2020 10:06 AM    HGB 13.4 06/26/2020 10:06 AM    HCT 37.9 06/26/2020 10:06 AM    PLATELET 804 68/89/9454 10:06 AM    MCV 96 06/26/2020 10:06 AM     Lab Results   Component Value Date/Time    GFR est non- 06/26/2020 10:06 AM    GFR est  06/26/2020 10:06 AM    Creatinine 0.58 06/26/2020 10:06 AM    BUN 17 06/26/2020 10:06 AM    Sodium 140 06/26/2020 10:06 AM    Potassium 4.4 06/26/2020 10:06 AM    Chloride 103 06/26/2020 10:06 AM    CO2 23 06/26/2020 10:06 AM     Lab Results   Component Value Date/Time    TSH 0.272 (L) 06/26/2020 10:06 AM    TSH 0.22 (L) 10/15/2010 04:15 PM        IMPRESSION:   Congestive heart failure-  Doing well with current med. Hypothyroid-  Recent thyroid level is slightly elevated. Asked patient to follow up with her endocrinologist.     According to patient she received clearance from cardiologist already. Cleared for surgery from my stand point.      Vicki Shea MD   6/26/2020

## 2020-06-27 LAB
BASOPHILS # BLD AUTO: 0.1 X10E3/UL (ref 0–0.2)
BASOPHILS NFR BLD AUTO: 1 %
BUN SERPL-MCNC: 17 MG/DL (ref 6–24)
BUN/CREAT SERPL: 29 (ref 9–23)
CALCIUM SERPL-MCNC: 9.6 MG/DL (ref 8.7–10.2)
CHLORIDE SERPL-SCNC: 103 MMOL/L (ref 96–106)
CO2 SERPL-SCNC: 23 MMOL/L (ref 20–29)
CREAT SERPL-MCNC: 0.58 MG/DL (ref 0.57–1)
EOSINOPHIL # BLD AUTO: 0.6 X10E3/UL (ref 0–0.4)
EOSINOPHIL NFR BLD AUTO: 12 %
ERYTHROCYTE [DISTWIDTH] IN BLOOD BY AUTOMATED COUNT: 13.1 % (ref 11.7–15.4)
EST. AVERAGE GLUCOSE BLD GHB EST-MCNC: 117 MG/DL
GLUCOSE SERPL-MCNC: 94 MG/DL (ref 65–99)
HBA1C MFR BLD: 5.7 % (ref 4.8–5.6)
HCT VFR BLD AUTO: 37.9 % (ref 34–46.6)
HGB BLD-MCNC: 13.4 G/DL (ref 11.1–15.9)
IMM GRANULOCYTES # BLD AUTO: 0 X10E3/UL (ref 0–0.1)
IMM GRANULOCYTES NFR BLD AUTO: 0 %
LYMPHOCYTES # BLD AUTO: 1.6 X10E3/UL (ref 0.7–3.1)
LYMPHOCYTES NFR BLD AUTO: 31 %
MCH RBC QN AUTO: 34.1 PG (ref 26.6–33)
MCHC RBC AUTO-ENTMCNC: 35.4 G/DL (ref 31.5–35.7)
MCV RBC AUTO: 96 FL (ref 79–97)
MONOCYTES # BLD AUTO: 0.5 X10E3/UL (ref 0.1–0.9)
MONOCYTES NFR BLD AUTO: 9 %
NEUTROPHILS # BLD AUTO: 2.4 X10E3/UL (ref 1.4–7)
NEUTROPHILS NFR BLD AUTO: 47 %
PLATELET # BLD AUTO: 276 X10E3/UL (ref 150–450)
POTASSIUM SERPL-SCNC: 4.4 MMOL/L (ref 3.5–5.2)
RBC # BLD AUTO: 3.93 X10E6/UL (ref 3.77–5.28)
SODIUM SERPL-SCNC: 140 MMOL/L (ref 134–144)
T4 FREE SERPL-MCNC: 1.78 NG/DL (ref 0.82–1.77)
TSH SERPL DL<=0.005 MIU/L-ACNC: 0.27 UIU/ML (ref 0.45–4.5)
WBC # BLD AUTO: 5.2 X10E3/UL (ref 3.4–10.8)

## 2020-06-29 ENCOUNTER — HOSPITAL ENCOUNTER (OUTPATIENT)
Dept: PREADMISSION TESTING | Age: 60
Discharge: HOME | End: 2020-06-29
Payer: COMMERCIAL

## 2020-06-29 VITALS
BODY MASS INDEX: 32.39 KG/M2 | DIASTOLIC BLOOD PRESSURE: 59 MMHG | WEIGHT: 206.35 LBS | TEMPERATURE: 97.8 F | HEART RATE: 69 BPM | SYSTOLIC BLOOD PRESSURE: 107 MMHG | HEIGHT: 67 IN | RESPIRATION RATE: 16 BRPM | OXYGEN SATURATION: 99 %

## 2020-06-29 LAB
ABO + RH BLD: NORMAL
ALBUMIN SERPL-MCNC: 3.3 G/DL (ref 3.5–5)
ALBUMIN/GLOB SERPL: 0.8 {RATIO} (ref 1.1–2.2)
ALP SERPL-CCNC: 52 U/L (ref 45–117)
ALT SERPL-CCNC: 27 U/L (ref 12–78)
ANION GAP SERPL CALC-SCNC: 6 MMOL/L (ref 5–15)
APPEARANCE UR: CLEAR
AST SERPL-CCNC: 20 U/L (ref 15–37)
BACTERIA URNS QL MICRO: NEGATIVE /HPF
BILIRUB SERPL-MCNC: 0.3 MG/DL (ref 0.2–1)
BILIRUB UR QL: NEGATIVE
BLOOD GROUP ANTIBODIES SERPL: NORMAL
BUN SERPL-MCNC: 15 MG/DL (ref 6–20)
BUN/CREAT SERPL: 28 (ref 12–20)
CALCIUM SERPL-MCNC: 8.8 MG/DL (ref 8.5–10.1)
CHLORIDE SERPL-SCNC: 107 MMOL/L (ref 97–108)
CO2 SERPL-SCNC: 26 MMOL/L (ref 21–32)
COLOR UR: ABNORMAL
CREAT SERPL-MCNC: 0.54 MG/DL (ref 0.55–1.02)
EPITH CASTS URNS QL MICRO: ABNORMAL /LPF
GLOBULIN SER CALC-MCNC: 3.9 G/DL (ref 2–4)
GLUCOSE SERPL-MCNC: 96 MG/DL (ref 65–100)
GLUCOSE UR STRIP.AUTO-MCNC: NEGATIVE MG/DL
HGB UR QL STRIP: NEGATIVE
INR PPP: 1 (ref 0.9–1.1)
KETONES UR QL STRIP.AUTO: NEGATIVE MG/DL
LEUKOCYTE ESTERASE UR QL STRIP.AUTO: ABNORMAL
NITRITE UR QL STRIP.AUTO: NEGATIVE
PH UR STRIP: 5 [PH] (ref 5–8)
POTASSIUM SERPL-SCNC: 3.9 MMOL/L (ref 3.5–5.1)
PROT SERPL-MCNC: 7.2 G/DL (ref 6.4–8.2)
PROT UR STRIP-MCNC: NEGATIVE MG/DL
PROTHROMBIN TIME: 10.7 SEC (ref 9–11.1)
RBC #/AREA URNS HPF: ABNORMAL /HPF (ref 0–5)
SODIUM SERPL-SCNC: 139 MMOL/L (ref 136–145)
SP GR UR REFRACTOMETRY: 1.02 (ref 1–1.03)
SPECIMEN EXP DATE BLD: NORMAL
UA: UC IF INDICATED,UAUC: ABNORMAL
UROBILINOGEN UR QL STRIP.AUTO: 0.2 EU/DL (ref 0.2–1)
WBC URNS QL MICRO: ABNORMAL /HPF (ref 0–4)

## 2020-06-29 PROCEDURE — 97110 THERAPEUTIC EXERCISES: CPT

## 2020-06-29 PROCEDURE — 85610 PROTHROMBIN TIME: CPT

## 2020-06-29 PROCEDURE — 97161 PT EVAL LOW COMPLEX 20 MIN: CPT

## 2020-06-29 PROCEDURE — 86900 BLOOD TYPING SEROLOGIC ABO: CPT

## 2020-06-29 PROCEDURE — 97116 GAIT TRAINING THERAPY: CPT

## 2020-06-29 PROCEDURE — 36415 COLL VENOUS BLD VENIPUNCTURE: CPT

## 2020-06-29 PROCEDURE — 81001 URINALYSIS AUTO W/SCOPE: CPT

## 2020-06-29 PROCEDURE — 80053 COMPREHEN METABOLIC PANEL: CPT

## 2020-06-29 RX ORDER — CHOLECALCIFEROL (VITAMIN D3) 50 MCG
10-15 CAPSULE ORAL DAILY
COMMUNITY

## 2020-06-29 RX ORDER — DOXYCYCLINE 100 MG/1
200 CAPSULE ORAL 2 TIMES DAILY
COMMUNITY

## 2020-06-29 RX ORDER — FEXOFENADINE HCL AND PSEUDOEPHEDRINE HCI 60; 120 MG/1; MG/1
1 TABLET, EXTENDED RELEASE ORAL EVERY 12 HOURS
COMMUNITY

## 2020-06-29 RX ORDER — CHOLECALCIFEROL (VITAMIN D3) 50 MCG
1000 CAPSULE ORAL DAILY
COMMUNITY

## 2020-06-29 NOTE — PROGRESS NOTES
Sent via my chart. Hi Ms Garcia,    I have reviewed your lab results. CBC, kidney function is normal.  Average blood sugar level is slightly elevated, in prediabetic range. . Continue work on eating healthy, avoid carbohydrate intake. Will check level in 6 months. Thyroid level is slightly above normal range. Please talk with your endocrinologist about the result. Good luck and take care.     Dr. Justine Romero

## 2020-06-29 NOTE — PROGRESS NOTES
[facility]  Physical Therapy Pre-surgery evaluation  [address] [facility], [address] [facility]    PHYSICAL THERAPY PRE THR SURGERY EVALUATION  Patient: Son Toth (89 y.o. female)  Date: 6/29/2020  Primary Diagnosis: UNILATERAL PRIMARY OA RIGHT HIP       Precautions: will have Anterior precautions       ASSESSMENT :  Based on the objective data described below, the patient presents with impaired gait, balance, pain, and overall high level functional mobility due to end stage degenerative joint disease in the right hip. Discussed anticipated disposition to home with possible discharge within a 1 to 2 day time frame post-surgery. Patient and  in agreement. GOALS: (Goals have been discussed and agreed upon with patient.)  DISCHARGE GOALS: Time Frame: 1 DAY  1. Patient will demonstrate increased strength, range of motion, and pain control via a home exercise program in order to minimize functional deficits in preparation for their upcoming surgery. This will be achieved by using education, demonstration and through the use of an informational handout including a home exercise program.  REHABILITATION POTENTIAL FOR STATED GOALS: Excellent     RECOMMENDATIONS AND PLANNED INTERVENTIONS: (Benefits and precautions of physical therapy have been discussed with the patient.)  · Home Exercise Program  TREATMENT PLAN EFFECTIVE DATES: 6/29/2020 TO 6/29/2020  FREQUENCY/DURATION: Patient to continue to perform home exercise program at least twice daily until her surgery. SUBJECTIVE:   Patient stated I've done my homework and will do whatever I need to get the most from this surgery.     OBJECTIVE DATA SUMMARY:   HISTORY:    Past Medical History:   Diagnosis Date    Arthritis     Heart failure ([address]     Thyroid disease      Past Surgical History:   Procedure Laterality Date    HX APPENDECTOMY      HX GYN      dysplasia     Prior Level of Function/Home Situation: independent with all ADLs and mobility without devices. Prolonged standing, walking and crouching worsens pain. Patient wakes patient up every night. Works full time as a . Lives alone in 1 story home with 4 steps to enter. Daughter and boyfriend will assist patient once home. Personal factors and/or comorbidities impacting plan of care: per patient a h/o heart failure. Patient []   does  [x]   does not state signs/symptoms of shortness of breath/dyspnea on exertion/respiratory distress. Home Situation  Home Environment: Private residence  # Steps to Enter: 4  Rails to Enter: Yes  Hand Rails : Bilateral  Wheelchair Ramp: No  One/Two Story Residence: Two story(main living on first floor, laundry in basement and master, has room on main floor for after surgery)  # of Interior Steps: [address] [facility] Floor: Right  Lift Chair Available: No  Living Alone: Yes  Support Systems: Family member(s)  Patient Expects to be Discharged to[de-identified] Private residence  Current DME Used/Available at Home: Raised toilet seat, Shower chair, Grab bars  Tub or Shower Type: Tub/Shower combination      EXAMINATION/PRESENTATION/DECISION MAKING:     ADLs (Current Functional Status):    Bathing/Showering:   [x] Independent  [] Requires Assistance from Someone  [] Sponge Bath Only   Ambulation:  [x] Independent  [] Walk Indoors Only  [x] Walk Outdoors  [] Use Assistive Device  [] Use Wheelchair Only     Dressing:  [x] Independent    Requires Assistance from Someone for:  [] Sock/Shoes  [] Pants  [] Everything   Household Activities:  [x] Routine house and yard work  [x] Light Housework Only  [] None       Critical Behavior:  Neurologic State: Alert  Orientation Level: Oriented X4  Cognition: Appropriate decision making, Appropriate for age attention/concentration, Appropriate safety awareness, Follows commands  Safety/Judgement: Awareness of environment, Good awareness of safety precautions    Strength:    Strength: Generally decreased, functional(3-/5 R hip; 4-/5 R quad; 4/5 other joints)                    Tone & Sensation:   Tone: Normal              Sensation: Intact               Range Of Motion:  AROM: Within functional limits           PROM: Generally decreased, functional(decreased R hip flexion, ER and abd 30%; wfl otherwise)           Coordination:  Coordination: Within functional limits    Functional Mobility:  Transfers:  Sit to Stand: Modified independent  Stand to Sit: Modified independent        Bed to Chair: Independent              Balance:   Sitting: Intact  Standing: Intact  Ambulation/Gait Training:  Distance (ft): 150 Feet (ft)     Ambulation - Level of Assistance: Independent     Gait Description (WDL): Exceptions to WDL  Gait Abnormalities: Antalgic, Path deviations(no lob)  Right Side Weight Bearing: As tolerated  Left Side Weight Bearing: Full  Base of Support: Widened                 Interventions: Safety awareness training        Therapeutic Exercises:   The patient was educated in, has demonstrated, and has received written instructions to complete for their home exercise program per total hip replacement protocol. Functional Measure:  Timed up and go:    Timed Get Up And Go Test: 11.1       < than 10 seconds=Normal  Greater then 13.5 seconds (in elderly)=Increased fall risk   Jeronimo Portillo [place] M. Predicting the probability for falls in community dwelling older adults using the Timed Up and Go Test. Phys Ther. 2000;80:896-903. Pain:  Pain Scale 1: Numeric (0 - 10)  Pain Intensity 1: 1  Pain Location 1: Hip  Pain Orientation 1: Right  Pain Description 1: Aching       Activity Tolerance:   Good. Patient []   does  [x]   does not demonstrate signs/symptoms of shortness of breath/dyspnea on exertion/respiratory distress.   COMMUNICATION/EDUCATION:   The patient was educated on:  [x]         Early post operative mobility is imperative to achieve a patient's desired outcomes and to restore biological function. [x]         Post operative hip precautions may/may not be applicable. These precautions are based on the patient's physician and the procedure(s) performed. [x]         Anterior hip precautions including:  ·       No hip extension  ·       No external rotation  ·       No crossing of the legs/midline    The patients plan of care was discussed as follows:   [x]         The patient verbalized understanding of her plan in preparation for their upcoming surgery  []         The patient's  was present for this session  [x]        The patient reports that he/she does not have a  identified at this time  []         The  verbalized understanding of the education regarding the patient's upcoming surgery  [x]         Patient/family agree to work toward stated goals and plan of care. []         Patient understands intent and goals of therapy, but is neutral about his/her participation. []         Patient is unable to participate in goal setting and plan of care.       Thank you for this referral.  Janel Conde, PT   Time Calculation: 32 mins

## 2020-06-29 NOTE — PERIOP NOTES
[facility]  Joint/Spine Preoperative Instructions    Surgery Date 7/6/2020          Time of [place]  Contact # [phone] cell    1. On the day of your surgery, please report to the Surgical Services Registration Desk and sign in at your designated time. The Surgery Center is located to the right of the Emergency Room. 2. You must have someone with you to drive you home. You should not drive a car for 24 hours following surgery. Please make arrangements for a friend or family member to stay with you for the first 24 hours after your surgery. 3. No food after midnight 7/5/2020. Medications morning of surgery should be taken with a sip of water. Please follow pre-surgery drink instructions that were given at your Pre Admission Testing appointment. 4. We recommend you do not drink any alcoholic beverages for 24 hours before and after your surgery. 5. Contact your surgeons office for instructions on the following medications: non-steroidal anti-inflammatory drugs (i.e. Advil, Aleve), vitamins, and supplements. (Some surgeons will want you to stop these medications prior to surgery and others may allow you to take them)  **If you are currently taking Plavix, Coumadin, Aspirin and/or other blood-thinning agents, contact your surgeon for instructions. ** Your surgeon will partner with the physician prescribing these medications to determine if it is safe to stop or if you need to continue taking. Please do not stop taking these medications without instructions from your surgeon    6. Wear comfortable clothes. Wear glasses instead of contacts. Do not bring any money or jewelry. Please bring picture ID, insurance card, and any prearranged co-payment or hospital payment. Do not wear make-up, particularly mascara the morning of your surgery. Do not wear nail polish, particularly if you are having foot /hand surgery.   Wear your hair loose or down, no ponytails, buns, miguelina pins or clips. All body piercings must be removed. Please shower with antibacterial soap for three consecutive days before and on the morning of surgery, but do not apply any lotions, powders or deodorants after the shower on the day of surgery. Please use a fresh towels after each shower. Please sleep in clean clothes and change bed linens the night before surgery. Please do not shave for 48 hours prior to surgery. Shaving of the face is acceptable. 7. You should understand that if you do not follow these instructions your surgery may be cancelled. If your physical condition changes (I.e. fever, cold or flu) please contact your surgeon as soon as possible. 8. It is important that you be on time. If a situation occurs where you may be late, please call [phone] (OR Holding Area). 9. If you have any questions and or problems, please call [phone] (Pre-admission Testing). 10. Your surgery time may be subject to change. You will receive a phone call the evening prior if your time changes. 11.  If having outpatient surgery, you must have someone to drive you here, stay with you during the duration of your stay, and to drive you home at time of discharge. 12.   In an effort to improve the efficiency, privacy, and safety for all of our Pre-op patients visitors are not allowed in the Holding area. Once you arrive and are registered your family/visitors will be asked to remain in your vehicle. The Pre-op staff will call you when they are ready for you to enter the building and will explain to you and your family/visitors that the Pre-op phase is beginning. At this time, if your procedure is outpatient, your family member will be asked to stay in their vehicle until the surgery is complete and you are ready for discharge. If you are going to be admitted after your surgery, once you are called to come inside the building, your family will be able to leave the parking lot.    At this time the staff will also ask for your designated spokesperson information in the event that the physician or staff need to provide an update or obtain any pertinent information. The designated spokesperson will be notified if the physician needs to speak to family during the pre-operative phase.and/or in the post op phase. 13.  The following link is for the educational video for patients and/or families. [email or web address]-[email or web address]-materials    Special Instructions:   Covid testing scheduled for 7/2/2020 @ 0800. Patient must self-quarantine after covid test obtained and up to date of surgery. Practice incentive spirometer per instructions and bring to hospital day of surgery. TAKE ALL MEDICATIONS THE DAY OF SURGERY EXCEPT:   NONE      I understand a pre-operative phone call will be made to verify my surgery time. In the event that I am not available, I give permission for a message to be left on my answering service and/or with another person?   yes         ___________________        __________  6/29/20 @ 1467    (Signature of Patient)             (Witness)                (Date and Time)

## 2020-06-29 NOTE — PERIOP NOTES
Hibiclens/Chlorhexidine    Preventing Infections Before and After  Your Surgery    IMPORTANT INSTRUCTIONS    Please read and follow these instructions carefully. If you are unable to comply with the below instructions your procedure will be cancelled. Every Night for Three (3) nights before your surgery:  1. Shower with an antibacterial soap, such as Dial, or the soap provided at your preassessment appointment. A shower is better than a bath for cleaning your skin. 2. If needed, ask someone to help you reach all areas of your body. Dont forget to clean your belly button with every shower. The night before your surgery: If you lose your Hibiclens/chlorhexidine please contact surgery center or you can purchase it at a local pharmacy  1. On the night before your surgery, shower with an antibacterial soap, such as Dial, or the soap provided at your preassessment appointment. 2. With one packet of Hibiclens/Chlorhexidine in hand, turn water off.  3. Apply Hibiclens antiseptic skin cleanser with a clean, freshly washed washcloth. ? Gently apply to your body from chin to toes (except the genital area) and especially the area(s) where your incision(s) will be. ? Leave Hibiclens/Chlorhexidine on your skin for at least 20 seconds. CAUTION: If needed, Hibiclens/chlorhexidine may be used to clean the folds of skin of the legs (such as in the area of the groin) and on your buttocks and hips. However, do not use Hibiclens/Chlorhexidine above the neck or in the genital area (your bottom) or put inside any area of your body. 4. Turn the water back on and rinse. 5. Dry gently with a clean, freshly washed towel. 6. After your shower, do not use any powder, deodorant, perfumes or lotion. 7. Use clean, freshly washed towels and washcloths every time you shower. 8. Wear clean, freshly washed pajamas to bed the night before surgery. 9. Sleep on clean, freshly washed sheets.   10. Do not allow pets to sleep in your bed with you. The Morning of your surgery:  1. Shower again thoroughly with an antibacterial soap, such as Dial or the soap provided at your preassessment appointment. If needed, ask someone for help to reach all areas of your body. Dont forget to clean your belly button! Rinse. 2. Dry gently with a clean, freshly washed towel. 3. After your shower, do not use any powder, deodorant, perfumes or lotion prior to surgery. 4. Put on clean, freshly washed clothing. Tips to help prevent infections after your surgery:  1. Protect your surgical wound from germs:  ? Hand washing is the most important thing you and your caregivers can do to prevent infections. ? Keep your bandage clean and dry! ? Do not touch your surgical wound. 2. Use clean, freshly washed towels and washcloths every time you shower; do not share bath linens with others. 3. Until your surgical wound is healed, wear clothing and sleep on bed linens each day that are clean and freshly washed. 4. Do not allow pets to sleep in your bed with you or touch your surgical wound. 5. Do not smoke  smoking delays wound healing. This may be a good time to stop smoking. 6. If you have diabetes, it is important for you to manage your blood sugar levels properly before your surgery as well as after your surgery. Poorly managed blood sugar levels slow down wound healing and prevent you from healing completely. If you lose your Hibiclens/chlorhexidine, please call the [facility], or it is available for purchase at your pharmacy.                ___________________      ___________________      6/29/20 @ 2234  (Signature of Patient)          (Witness)                   (Date and Time)

## 2020-06-29 NOTE — ADVANCED PRACTICE NURSE
PAT Nurse Practitioner   Pre-Operative Chart Review/Assessment:-ORTHOPEDIC/NEUROSURGICAL SPINE                Patient Name:  Kain Sánchez                                                         Age:   61 y.o.    :  1960     Today's Date:  2020     Date of PAT:   20      Date of Surgery:    20     Procedure(s):  Right total hip arthroplasty     Surgeon:   Dr. Keya Barnhart Clearance:  Dr. Digna Duncan:      1)  Cardiac Clearance:  Dr. Meka Louise        2)  Diabetic Treatment Consult:  Not indicated-A1C 5.7      3)  Sleep Apnea evaluation:   Not indicated-HEATH 1      4) Treatment for MRSA/Staph Aureus:  Negative       5) Additional Concerns:  Acute systolic heart failure, cardiomyopathy, former smoker                 Vital Signs:         Vitals:    20 1111   BP: 107/59   Pulse: 69   Resp: 16   Temp: 97.8 °F (36.6 °C)   SpO2: 99%   Weight: 93.6 kg (206 lb 5.6 oz)   Height: 5' 7\" (1.702 m)            ____________________________________________  PAST MEDICAL HISTORY  Past Medical History:   Diagnosis Date    Arthritis     Heart failure ([address]     Thyroid disease       ____________________________________________  PAST SURGICAL HISTORY  Past Surgical History:   Procedure Laterality Date    HX APPENDECTOMY      HX GYN      dysplasia      ____________________________________________  HOME MEDICATIONS    Current Outpatient Medications   Medication Sig    doxycycline (MONODOX) 100 mg capsule Take 200 mg by mouth two (2) times a day.  B.infantis-B.ani-B.long-B.bifi (Probiotic 4X) 10-15 mg TbEC Take  by mouth.  omega 3-dha-epa-fish oil (Fish Oil) 100-160-1,000 mg cap Take  by mouth daily.  calcium carbonate (CALCIUM 600 PO) Take 1,000 mg by mouth daily.  fexofenadine-pseudoephedrine (Allegra-D 12 Hour)  mg Tb12 Take 1 Tab by mouth every twelve (12) hours.  OTHER Occuvite   Ruten-gums    meloxicam (MOBIC) 15 mg tablet Take 1 Tab by mouth daily.     furosemide (LASIX) 40 mg tablet TAKE 1 TABLET BY MOUTH EVERY DAY    metoprolol succinate (TOPROL-XL) 25 mg XL tablet TAKE 1 TABLET BY MOUTH EVERY DAY    Entresto 24-26 mg tablet TAKE 1 TABLET BY MOUTH TWICE A DAY    Levothyroxine 150 mcg cap Take 150 mcg by mouth Daily (before breakfast). No current facility-administered medications for this encounter.       ____________________________________________  ALLERGIES  No Known Allergies   ____________________________________________  SOCIAL HISTORY  Social History     Tobacco Use    Smoking status: Former Smoker     Packs/day: 0.50     Years: 30.00     Pack years: 15.00     Last attempt to quit: 2019     Years since quittin.4    Smokeless tobacco: Never Used   Substance Use Topics    Alcohol use: Yes     Alcohol/week: 1.0 standard drinks     Types: 1 Glasses of wine per week     Comment: weekends      ____________________________________________        Labs:     Results for Cecily Arias (MRN [id number]) as of 2020 15:12   Ref. Range 2020 10:06 2020 11:30   WBC Latest Ref Range: 3.4 - 10.8 x10E3/uL 5.2    RBC Latest Ref Range: 3.77 - 5.28 x10E6/uL 3.93    HGB Latest Ref Range: 11.1 - 15.9 g/dL 13.4    HCT Latest Ref Range: 34.0 - 46.6 % 37.9    MCV Latest Ref Range: 79 - 97 fL 96    MCH Latest Ref Range: 26.6 - 33.0 pg 34.1 (H)    MCHC Latest Ref Range: 31.5 - 35.7 g/dL 35.4    RDW Latest Ref Range: 11.7 - 15.4 % 13.1    PLATELET Latest Ref Range: 150 - 450 x10E3/uL 276    NEUTROPHILS Latest Ref Range: Not Estab. % 47    Lymphocytes Latest Ref Range: Not Estab. % 31    MONOCYTES Latest Ref Range: Not Estab. % 9    EOSINOPHILS Latest Ref Range: Not Estab. % 12    BASOPHILS Latest Ref Range: Not Estab. % 1    IMMATURE GRANULOCYTES Latest Ref Range: Not Estab. % 0    ABS. NEUTROPHILS Latest Ref Range: 1.4 - 7.0 x10E3/uL 2.4    ABS. IMM. GRANS.  Latest Ref Range: 0.0 - 0.1 x10E3/uL 0.0    Abs Lymphocytes Latest Ref Range: 0.7 - 3.1 x10E3/uL 1.6 ABS. MONOCYTES Latest Ref Range: 0.1 - 0.9 x10E3/uL 0.5    ABS. EOSINOPHILS Latest Ref Range: 0.0 - 0.4 x10E3/uL 0.6 (H)    ABS.  BASOPHILS Latest Ref Range: 0.0 - 0.2 x10E3/uL 0.1    Color Latest Units:    YELLOW/STRAW   Appearance Latest Ref Range: CLEAR    CLEAR   Specific gravity Latest Ref Range: 1.003 - 1.030    1.018   pH (UA) Latest Ref Range: 5.0 - 8.0    5.0   Protein Latest Ref Range: NEG mg/dL  Negative   Glucose Latest Ref Range: NEG mg/dL  Negative   Ketone Latest Ref Range: NEG mg/dL  Negative   Blood Latest Ref Range: NEG    Negative   Bilirubin Latest Ref Range: NEG    Negative   Urobilinogen Latest Ref Range: 0.2 - 1.0 EU/dL  0.2   Nitrites Latest Ref Range: NEG    Negative   Leukocyte Esterase Latest Ref Range: NEG    SMALL (A)   Epithelial cells Latest Ref Range: FEW /lpf  FEW   WBC Latest Ref Range: 0 - 4 /hpf  5-10   RBC Latest Ref Range: 0 - 5 /hpf  5-10   Bacteria Latest Ref Range: NEG /hpf  Negative   INR Latest Ref Range: 0.9 - 1.1    1.0   Prothrombin time Latest Ref Range: 9.0 - 11.1 sec  10.7   Sodium Latest Ref Range: 136 - 145 mmol/L 140 139   Potassium Latest Ref Range: 3.5 - 5.1 mmol/L 4.4 3.9   Chloride Latest Ref Range: 97 - 108 mmol/L 103 107   CO2 Latest Ref Range: 21 - 32 mmol/L 23 26   Anion gap Latest Ref Range: 5 - 15 mmol/L  6   Glucose Latest Ref Range: 65 - 100 mg/dL 94 96   BUN Latest Ref Range: 6 - 20 MG/DL 17 15   Creatinine Latest Ref Range: 0.55 - 1.02 MG/DL 0.58 0.54 (L)   BUN/Creatinine ratio Latest Ref Range: 12 - 20   29 (H) 28 (H)   Calcium Latest Ref Range: 8.5 - 10.1 MG/DL 9.6 8.8   GFR est non-AA Latest Ref Range: >60 ml/min/1.73m2 101 >60   GFR est AA Latest Ref Range: >60 ml/min/1.73m2 117 >60   Bilirubin, total Latest Ref Range: 0.2 - 1.0 MG/DL  0.3   Protein, total Latest Ref Range: 6.4 - 8.2 g/dL  7.2   Albumin Latest Ref Range: 3.5 - 5.0 g/dL  3.3 (L)   Globulin Latest Ref Range: 2.0 - 4.0 g/dL  3.9   A-G Ratio Latest Ref Range: 1.1 - 2.2    0.8 (L) ALT Latest Ref Range: 12 - 78 U/L  27   AST Latest Ref Range: 15 - 37 U/L  20   Alk.  phosphatase Latest Ref Range: 45 - 117 U/L  52   Hemoglobin A1c, (calculated) Latest Ref Range: 4.8 - 5.6 % 5.7 (H)    Estimated average glucose Latest Units: mg/dL 117    TSH Latest Ref Range: 0.450 - 4.500 uIU/mL 0.272 (L)    CULTURE, MRSA Unknown  Rpt   Crossmatch Expiration Unknown  07/09/2020   TYPE & SCREEN Unknown  Rpt   THYROID CASCADE PROFILE Unknown Rpt (A)    THYROXINE (T4) FREE, DIRECT, S Unknown Rpt (A)        Skin:   Denies open wounds, cuts, sores, rashes or other areas of concern in PAT assessment       Crystal Abdi NP

## 2020-06-29 NOTE — PERIOP NOTES
Incentive Spirometer        Using the incentive spirometer helps expand the small air sacs of your lungs, helps you breathe deeply, and helps improve your lung function. Use your incentive spirometer twice a day (10 breaths each time) prior to surgery. How to Use Your Incentive Spirometer:  1. Hold the incentive spirometer in an upright position. 2. Breathe out as usual.   3. Place the mouthpiece in your mouth and seal your lips tightly around it. 4. Take a deep breath. Breathe in slowly and as deeply as possible. Keep the blue flow rate guide between the arrows. 5. Hold your breath as long as possible. Then exhale slowly and allow the piston to fall to the bottom of the column. 6. Rest for a few seconds and repeat steps one through five at least 10 times. PAT Tidal Volume___2250__  x__2__  Date_6/29/20___    Ralph Muskrat THE INCENTIVE SPIROMETER WITH YOU TO THE HOSPITAL ON THE DAY OF YOUR SURGERY. Opportunity given to ask and answer questions as well as to observe return demonstration.     Patient signature_____________________________    Witness____________________________

## 2020-06-29 NOTE — PERIOP NOTES

## 2020-06-30 LAB
BACTERIA SPEC CULT: NORMAL
BACTERIA SPEC CULT: NORMAL
SERVICE CMNT-IMP: NORMAL

## 2020-06-30 RX ORDER — SODIUM CHLORIDE, SODIUM LACTATE, POTASSIUM CHLORIDE, CALCIUM CHLORIDE 600; 310; 30; 20 MG/100ML; MG/100ML; MG/100ML; MG/100ML
25 INJECTION, SOLUTION INTRAVENOUS CONTINUOUS
Status: CANCELLED | OUTPATIENT
Start: 2020-07-06

## 2020-07-02 ENCOUNTER — HOSPITAL ENCOUNTER (OUTPATIENT)
Dept: PREADMISSION TESTING | Age: 60
Discharge: HOME | End: 2020-07-02
Attending: ORTHOPAEDIC SURGERY
Payer: COMMERCIAL

## 2020-07-02 PROCEDURE — 87635 SARS-COV-2 COVID-19 AMP PRB: CPT

## 2020-07-03 LAB
SARS-COV-2, COV2NT: NOT DETECTED
SOURCE, COVRS: NORMAL
SPECIMEN SOURCE, FCOV2M: NORMAL

## 2020-07-05 PROCEDURE — 74011250637 HC RX REV CODE- 250/637: Performed by: ORTHOPAEDIC SURGERY

## 2020-07-05 RX ADMIN — ACETAMINOPHEN 1000 MG: 500 TABLET ORAL at 20:00

## 2020-07-05 NOTE — H&P
2020    Chief Complaint: Right hip pain    HPI: This is a 61 y.o. patient who complains of right hip pain which is activity dependent. The patient has failed nonoperative management of this end stage arthritis of the right hip and would like to proceed with a total hip arthroplasty. Patient has been medically and specialty cleared. The patient denies any numbness, weakness, tingling, fevers, chills, nausea, vomiting, fevers, chills, nausea, vomiting. Past Medical History:   Diagnosis Date    Arthritis     Cardiomyopathy [facility])     cardiologist-Dr. Tamera Babin     Heart failure ([facility] [address]     acute systolic heart failure    Thyroid disease        Past Surgical History:   Procedure Laterality Date    HX APPENDECTOMY      HX GYN      dysplasia       No current facility-administered medications on file prior to encounter. Current Outpatient Medications on File Prior to Encounter   Medication Sig Dispense Refill    meloxicam (MOBIC) 15 mg tablet Take 1 Tab by mouth daily. 60 Tab 1    furosemide (LASIX) 40 mg tablet TAKE 1 TABLET BY MOUTH EVERY DAY      metoprolol succinate (TOPROL-XL) 25 mg XL tablet TAKE 1 TABLET BY MOUTH EVERY DAY      Entresto 24-26 mg tablet TAKE 1 TABLET BY MOUTH TWICE A DAY      Levothyroxine 150 mcg cap Take 150 mcg by mouth Daily (before breakfast).          No Known Allergies    Family History   Problem Relation Age of Onset    Heart Disease Mother     Hypertension Mother     Diabetes Brother     Hypertension Brother        Social History     Socioeconomic History    Marital status: SINGLE     Spouse name: Not on file    Number of children: Not on file    Years of education: Not on file    Highest education level: Not on file   Tobacco Use    Smoking status: Former Smoker     Packs/day: 0.50     Years: 30.00     Pack years: 15.00     Last attempt to quit: 2019     Years since quittin.5    Smokeless tobacco: Never Used   Substance and Sexual Activity    Alcohol use: Yes     Alcohol/week: 1.0 standard drinks     Types: 1 Glasses of wine per week     Comment: weekends    Drug use: Never    Sexual activity: Yes   Other Topics Concern         Review of Systems:   Unless noted in the HPI, all 12 systems have been reviewed and are negative for pertinent positives. Physical Examination:      AOX3  No apparent distress  Lungs: Clear to auscultation bilaterally  Heart: regular rate and rhythm  Abdomen: soft, no guarding  Head: NC/AT  Sensation intact to light touch: L1-S1 dermatomes    Extremities      Left upper extremity: full active and passive range of motion without pain, deformity, open wound, strength 5/5 in all major muscle groups  Right upper extremity:full active and passive range of motion without pain, deformity, open wound, strength 5/5 in all major muscle groups  Left lower extremity:full active and passive range of motion without pain, deformity, open wound, strength 5/5 in all major muscle groups  Right lower extremity: No deformity is noted. Circumduction of the hip causes significant pain in the groin, reproductive of the chief complaint. Range of motion is limited, with a positive impingement sign. Gait is antalgic. Sensation is intact to light touch in the L1-S1 dermatomes. Skin is intact about the hip. Hip flexion and abduction strength is 4+/5, hip extension and knee extension as well as tibialis anterior and EHL/GCS are 5/5 strength.     Pertinent Xrays:  Pelvis and hip xrays indicate endstage arthrosis of the right hip      Assessment: Right hip OA    Plan:  Admit to my service  Plan for right total hip arthroplasty  NPO   Preoperative assessments complete      We did discuss the risks of surgery which include but are not limited to infection, nerve or blood vessel damage, failure of fixation, failure of any possible implant, need for reoperation, postoperative pain and swelling, intra-or postoperative fracture, postoperative dislocation, leg length inequality, need for reoperation, implant failure, death, disability, organ dysfunction, wound healing issues, DVT, PE, and the need for further procedures. The patient did freely state their understanding and satisfaction with our discussion. We will proceed after medical clearances.

## 2020-07-06 ENCOUNTER — ANESTHESIA EVENT (OUTPATIENT)
Dept: SURGERY | Age: 60
End: 2020-07-06
Payer: COMMERCIAL

## 2020-07-06 ENCOUNTER — APPOINTMENT (OUTPATIENT)
Dept: GENERAL RADIOLOGY | Age: 60
End: 2020-07-06
Attending: ORTHOPAEDIC SURGERY
Payer: COMMERCIAL

## 2020-07-06 ENCOUNTER — HOME HEALTH ADMISSION (OUTPATIENT)
Dept: HOME HEALTH SERVICES | Facility: HOME HEALTH | Age: 60
End: 2020-07-06
Payer: COMMERCIAL

## 2020-07-06 ENCOUNTER — HOSPITAL ENCOUNTER (OUTPATIENT)
Age: 60
Discharge: HOME HEALTH | End: 2020-07-07
Attending: ORTHOPAEDIC SURGERY | Admitting: ORTHOPAEDIC SURGERY
Payer: COMMERCIAL

## 2020-07-06 ENCOUNTER — ANESTHESIA (OUTPATIENT)
Dept: SURGERY | Age: 60
End: 2020-07-06
Payer: COMMERCIAL

## 2020-07-06 DIAGNOSIS — Z96.641 S/P HIP REPLACEMENT, RIGHT: Primary | ICD-10-CM

## 2020-07-06 PROBLEM — M16.11 UNILATERAL PRIMARY OSTEOARTHRITIS, RIGHT HIP: Status: ACTIVE | Noted: 2020-07-06

## 2020-07-06 LAB
ANION GAP BLD CALC-SCNC: 14 MMOL/L (ref 10–20)
BUN BLD-MCNC: 25 MG/DL (ref 9–20)
CA-I BLD-MCNC: 1.23 MMOL/L (ref 1.12–1.32)
CHLORIDE BLD-SCNC: 106 MMOL/L (ref 98–107)
CO2 BLD-SCNC: 24 MMOL/L (ref 21–32)
CREAT BLD-MCNC: 0.5 MG/DL (ref 0.6–1.3)
GLUCOSE BLD STRIP.AUTO-MCNC: 125 MG/DL (ref 65–100)
GLUCOSE BLD STRIP.AUTO-MCNC: 164 MG/DL (ref 65–100)
GLUCOSE BLD STRIP.AUTO-MCNC: 167 MG/DL (ref 65–100)
GLUCOSE BLD-MCNC: 74 MG/DL (ref 65–100)
HCT VFR BLD CALC: 41 % (ref 35–47)
POTASSIUM BLD-SCNC: 3.8 MMOL/L (ref 3.5–5.1)
SERVICE CMNT-IMP: ABNORMAL
SODIUM BLD-SCNC: 140 MMOL/L (ref 136–145)

## 2020-07-06 PROCEDURE — 77030018673: Performed by: ORTHOPAEDIC SURGERY

## 2020-07-06 PROCEDURE — 76010000162 HC OR TIME 1.5 TO 2 HR INTENSV-TIER 1: Performed by: ORTHOPAEDIC SURGERY

## 2020-07-06 PROCEDURE — 74011000250 HC RX REV CODE- 250: Performed by: ORTHOPAEDIC SURGERY

## 2020-07-06 PROCEDURE — 99218 HC RM OBSERVATION: CPT

## 2020-07-06 PROCEDURE — 74011250636 HC RX REV CODE- 250/636: Performed by: ORTHOPAEDIC SURGERY

## 2020-07-06 PROCEDURE — 82962 GLUCOSE BLOOD TEST: CPT

## 2020-07-06 PROCEDURE — 74011250637 HC RX REV CODE- 250/637: Performed by: NURSE PRACTITIONER

## 2020-07-06 PROCEDURE — 74011250636 HC RX REV CODE- 250/636: Performed by: NURSE ANESTHETIST, CERTIFIED REGISTERED

## 2020-07-06 PROCEDURE — 77030014125 HC TY EPDRL BBMI -B: Performed by: ANESTHESIOLOGY

## 2020-07-06 PROCEDURE — 72170 X-RAY EXAM OF PELVIS: CPT

## 2020-07-06 PROCEDURE — C1776 JOINT DEVICE (IMPLANTABLE): HCPCS | Performed by: ORTHOPAEDIC SURGERY

## 2020-07-06 PROCEDURE — 77030011264 HC ELECTRD BLD EXT COVD -A: Performed by: ORTHOPAEDIC SURGERY

## 2020-07-06 PROCEDURE — 74011250637 HC RX REV CODE- 250/637: Performed by: ORTHOPAEDIC SURGERY

## 2020-07-06 PROCEDURE — 77030011640 HC PAD GRND REM COVD -A: Performed by: ORTHOPAEDIC SURGERY

## 2020-07-06 PROCEDURE — 77030006835 HC BLD SAW SAG STRY -B: Performed by: ORTHOPAEDIC SURGERY

## 2020-07-06 PROCEDURE — 97161 PT EVAL LOW COMPLEX 20 MIN: CPT

## 2020-07-06 PROCEDURE — 76210000017 HC OR PH I REC 1.5 TO 2 HR: Performed by: ORTHOPAEDIC SURGERY

## 2020-07-06 PROCEDURE — 80047 BASIC METABLC PNL IONIZED CA: CPT

## 2020-07-06 PROCEDURE — 77030040922 HC BLNKT HYPOTHRM STRY -A

## 2020-07-06 PROCEDURE — 77030018836 HC SOL IRR NACL ICUM -A: Performed by: ORTHOPAEDIC SURGERY

## 2020-07-06 PROCEDURE — 77030031139 HC SUT VCRL2 J&J -A: Performed by: ORTHOPAEDIC SURGERY

## 2020-07-06 PROCEDURE — 74011250636 HC RX REV CODE- 250/636: Performed by: ANESTHESIOLOGY

## 2020-07-06 PROCEDURE — 76000 FLUOROSCOPY <1 HR PHYS/QHP: CPT

## 2020-07-06 PROCEDURE — 76060000034 HC ANESTHESIA 1.5 TO 2 HR: Performed by: ORTHOPAEDIC SURGERY

## 2020-07-06 PROCEDURE — 77030002933 HC SUT MCRYL J&J -A: Performed by: ORTHOPAEDIC SURGERY

## 2020-07-06 PROCEDURE — 77030038692 HC WND DEB SYS IRMX -B: Performed by: ORTHOPAEDIC SURGERY

## 2020-07-06 PROCEDURE — 97116 GAIT TRAINING THERAPY: CPT

## 2020-07-06 PROCEDURE — 73501 X-RAY EXAM HIP UNI 1 VIEW: CPT

## 2020-07-06 PROCEDURE — 74011000250 HC RX REV CODE- 250: Performed by: ANESTHESIOLOGY

## 2020-07-06 PROCEDURE — 74011000250 HC RX REV CODE- 250: Performed by: NURSE ANESTHETIST, CERTIFIED REGISTERED

## 2020-07-06 PROCEDURE — 77030010507 HC ADH SKN DERMBND J&J -B: Performed by: ORTHOPAEDIC SURGERY

## 2020-07-06 PROCEDURE — 77030040361 HC SLV COMPR DVT MDII -B: Performed by: ORTHOPAEDIC SURGERY

## 2020-07-06 PROCEDURE — 77030018846 HC SOL IRR STRL H20 ICUM -A: Performed by: ORTHOPAEDIC SURGERY

## 2020-07-06 DEVICE — STEM FEM SZ 4 127D 35X105MM -- ACCOLADE II V40: Type: IMPLANTABLE DEVICE | Site: HIP | Status: FUNCTIONAL

## 2020-07-06 DEVICE — INSERT ACET 0DEG 36MM E X3 -- TRIDENT: Type: IMPLANTABLE DEVICE | Site: HIP | Status: FUNCTIONAL

## 2020-07-06 DEVICE — HIP H2 TOT ADV OTHER HD -- IMPL CAPPED H2: Type: IMPLANTABLE DEVICE | Status: FUNCTIONAL

## 2020-07-06 DEVICE — HEAD FEM DELT V40 -5MM NK 36MM -- V40 BIOLOX: Type: IMPLANTABLE DEVICE | Site: HIP | Status: FUNCTIONAL

## 2020-07-06 RX ORDER — ROPIVACAINE HYDROCHLORIDE 5 MG/ML
INJECTION, SOLUTION EPIDURAL; INFILTRATION; PERINEURAL AS NEEDED
Status: DISCONTINUED | OUTPATIENT
Start: 2020-07-06 | End: 2020-07-06 | Stop reason: HOSPADM

## 2020-07-06 RX ORDER — ONDANSETRON 2 MG/ML
4 INJECTION INTRAMUSCULAR; INTRAVENOUS
Status: ACTIVE | OUTPATIENT
Start: 2020-07-06 | End: 2020-07-07

## 2020-07-06 RX ORDER — MIDAZOLAM HYDROCHLORIDE 1 MG/ML
INJECTION, SOLUTION INTRAMUSCULAR; INTRAVENOUS AS NEEDED
Status: DISCONTINUED | OUTPATIENT
Start: 2020-07-06 | End: 2020-07-06 | Stop reason: HOSPADM

## 2020-07-06 RX ORDER — MIDAZOLAM HYDROCHLORIDE 1 MG/ML
0.5 INJECTION, SOLUTION INTRAMUSCULAR; INTRAVENOUS
Status: DISCONTINUED | OUTPATIENT
Start: 2020-07-06 | End: 2020-07-06 | Stop reason: HOSPADM

## 2020-07-06 RX ORDER — OXYCODONE HYDROCHLORIDE 5 MG/1
5 TABLET ORAL
Status: DISCONTINUED | OUTPATIENT
Start: 2020-07-06 | End: 2020-07-07 | Stop reason: HOSPADM

## 2020-07-06 RX ORDER — AMOXICILLIN 250 MG
1 CAPSULE ORAL 2 TIMES DAILY
Status: DISCONTINUED | OUTPATIENT
Start: 2020-07-06 | End: 2020-07-07 | Stop reason: HOSPADM

## 2020-07-06 RX ORDER — SODIUM CHLORIDE 0.9 % (FLUSH) 0.9 %
5-40 SYRINGE (ML) INJECTION AS NEEDED
Status: DISCONTINUED | OUTPATIENT
Start: 2020-07-06 | End: 2020-07-07 | Stop reason: HOSPADM

## 2020-07-06 RX ORDER — DOXYCYCLINE HYCLATE 100 MG
100 TABLET ORAL EVERY 12 HOURS
Status: DISCONTINUED | OUTPATIENT
Start: 2020-07-06 | End: 2020-07-07 | Stop reason: HOSPADM

## 2020-07-06 RX ORDER — MORPHINE SULFATE 10 MG/ML
2 INJECTION, SOLUTION INTRAMUSCULAR; INTRAVENOUS
Status: DISCONTINUED | OUTPATIENT
Start: 2020-07-06 | End: 2020-07-06 | Stop reason: HOSPADM

## 2020-07-06 RX ORDER — PREGABALIN 75 MG/1
75 CAPSULE ORAL ONCE
Status: COMPLETED | OUTPATIENT
Start: 2020-07-06 | End: 2020-07-06

## 2020-07-06 RX ORDER — SODIUM CHLORIDE, SODIUM LACTATE, POTASSIUM CHLORIDE, CALCIUM CHLORIDE 600; 310; 30; 20 MG/100ML; MG/100ML; MG/100ML; MG/100ML
25 INJECTION, SOLUTION INTRAVENOUS CONTINUOUS
Status: DISCONTINUED | OUTPATIENT
Start: 2020-07-06 | End: 2020-07-06 | Stop reason: HOSPADM

## 2020-07-06 RX ORDER — ASPIRIN 325 MG
325 TABLET, DELAYED RELEASE (ENTERIC COATED) ORAL 2 TIMES DAILY
Status: DISCONTINUED | OUTPATIENT
Start: 2020-07-06 | End: 2020-07-07 | Stop reason: HOSPADM

## 2020-07-06 RX ORDER — NALOXONE HYDROCHLORIDE 0.4 MG/ML
0.4 INJECTION, SOLUTION INTRAMUSCULAR; INTRAVENOUS; SUBCUTANEOUS AS NEEDED
Status: DISCONTINUED | OUTPATIENT
Start: 2020-07-06 | End: 2020-07-07 | Stop reason: HOSPADM

## 2020-07-06 RX ORDER — SODIUM CHLORIDE 0.9 % (FLUSH) 0.9 %
5-40 SYRINGE (ML) INJECTION EVERY 8 HOURS
Status: DISCONTINUED | OUTPATIENT
Start: 2020-07-06 | End: 2020-07-07 | Stop reason: HOSPADM

## 2020-07-06 RX ORDER — ACETAMINOPHEN 500 MG
1000 TABLET ORAL EVERY 6 HOURS
Status: DISCONTINUED | OUTPATIENT
Start: 2020-07-06 | End: 2020-07-07 | Stop reason: HOSPADM

## 2020-07-06 RX ORDER — PROPOFOL 10 MG/ML
INJECTION, EMULSION INTRAVENOUS
Status: DISCONTINUED | OUTPATIENT
Start: 2020-07-06 | End: 2020-07-06 | Stop reason: HOSPADM

## 2020-07-06 RX ORDER — FENTANYL CITRATE 50 UG/ML
INJECTION, SOLUTION INTRAMUSCULAR; INTRAVENOUS AS NEEDED
Status: DISCONTINUED | OUTPATIENT
Start: 2020-07-06 | End: 2020-07-06 | Stop reason: HOSPADM

## 2020-07-06 RX ORDER — DIPHENHYDRAMINE HYDROCHLORIDE 50 MG/ML
12.5 INJECTION, SOLUTION INTRAMUSCULAR; INTRAVENOUS AS NEEDED
Status: DISCONTINUED | OUTPATIENT
Start: 2020-07-06 | End: 2020-07-06 | Stop reason: HOSPADM

## 2020-07-06 RX ORDER — SODIUM CHLORIDE 9 MG/ML
50 INJECTION, SOLUTION INTRAVENOUS CONTINUOUS
Status: DISPENSED | OUTPATIENT
Start: 2020-07-06 | End: 2020-07-07

## 2020-07-06 RX ORDER — TRANEXAMIC ACID 100 MG/ML
INJECTION, SOLUTION INTRAVENOUS AS NEEDED
Status: DISCONTINUED | OUTPATIENT
Start: 2020-07-06 | End: 2020-07-06 | Stop reason: HOSPADM

## 2020-07-06 RX ORDER — CALCIUM CARBONATE 500(1250)
500 TABLET ORAL
Status: DISCONTINUED | OUTPATIENT
Start: 2020-07-06 | End: 2020-07-07 | Stop reason: HOSPADM

## 2020-07-06 RX ORDER — SODIUM CHLORIDE 0.9 % (FLUSH) 0.9 %
5-40 SYRINGE (ML) INJECTION EVERY 8 HOURS
Status: DISCONTINUED | OUTPATIENT
Start: 2020-07-06 | End: 2020-07-06 | Stop reason: HOSPADM

## 2020-07-06 RX ORDER — PHENYLEPHRINE HCL IN 0.9% NACL 0.4MG/10ML
SYRINGE (ML) INTRAVENOUS AS NEEDED
Status: DISCONTINUED | OUTPATIENT
Start: 2020-07-06 | End: 2020-07-06 | Stop reason: HOSPADM

## 2020-07-06 RX ORDER — KETOROLAC TROMETHAMINE 30 MG/ML
30 INJECTION, SOLUTION INTRAMUSCULAR; INTRAVENOUS EVERY 6 HOURS
Status: COMPLETED | OUTPATIENT
Start: 2020-07-06 | End: 2020-07-07

## 2020-07-06 RX ORDER — HYDROMORPHONE HYDROCHLORIDE 1 MG/ML
.2-.5 INJECTION, SOLUTION INTRAMUSCULAR; INTRAVENOUS; SUBCUTANEOUS
Status: DISCONTINUED | OUTPATIENT
Start: 2020-07-06 | End: 2020-07-06 | Stop reason: HOSPADM

## 2020-07-06 RX ORDER — ONDANSETRON 2 MG/ML
4 INJECTION INTRAMUSCULAR; INTRAVENOUS AS NEEDED
Status: DISCONTINUED | OUTPATIENT
Start: 2020-07-06 | End: 2020-07-06 | Stop reason: HOSPADM

## 2020-07-06 RX ORDER — SODIUM CHLORIDE 0.9 % (FLUSH) 0.9 %
5-40 SYRINGE (ML) INJECTION AS NEEDED
Status: DISCONTINUED | OUTPATIENT
Start: 2020-07-06 | End: 2020-07-06 | Stop reason: HOSPADM

## 2020-07-06 RX ORDER — HYDROXYZINE HYDROCHLORIDE 10 MG/1
10 TABLET, FILM COATED ORAL
Status: DISCONTINUED | OUTPATIENT
Start: 2020-07-06 | End: 2020-07-07 | Stop reason: HOSPADM

## 2020-07-06 RX ORDER — FAMOTIDINE 20 MG/1
20 TABLET, FILM COATED ORAL 2 TIMES DAILY
Status: DISCONTINUED | OUTPATIENT
Start: 2020-07-06 | End: 2020-07-07 | Stop reason: HOSPADM

## 2020-07-06 RX ORDER — FENTANYL CITRATE 50 UG/ML
25 INJECTION, SOLUTION INTRAMUSCULAR; INTRAVENOUS
Status: DISCONTINUED | OUTPATIENT
Start: 2020-07-06 | End: 2020-07-06 | Stop reason: HOSPADM

## 2020-07-06 RX ORDER — ACETAMINOPHEN 500 MG
1000 TABLET ORAL ONCE
Status: COMPLETED | OUTPATIENT
Start: 2020-07-06 | End: 2020-07-05

## 2020-07-06 RX ORDER — ACETAMINOPHEN 325 MG/1
650 TABLET ORAL
Status: DISCONTINUED | OUTPATIENT
Start: 2020-07-06 | End: 2020-07-06 | Stop reason: SDUPTHER

## 2020-07-06 RX ORDER — FACIAL-BODY WIPES
10 EACH TOPICAL DAILY PRN
Status: DISCONTINUED | OUTPATIENT
Start: 2020-07-08 | End: 2020-07-07 | Stop reason: HOSPADM

## 2020-07-06 RX ORDER — FENTANYL CITRATE 50 UG/ML
50 INJECTION, SOLUTION INTRAMUSCULAR; INTRAVENOUS AS NEEDED
Status: DISCONTINUED | OUTPATIENT
Start: 2020-07-06 | End: 2020-07-06 | Stop reason: HOSPADM

## 2020-07-06 RX ORDER — LIDOCAINE HYDROCHLORIDE 10 MG/ML
0.1 INJECTION, SOLUTION EPIDURAL; INFILTRATION; INTRACAUDAL; PERINEURAL AS NEEDED
Status: DISCONTINUED | OUTPATIENT
Start: 2020-07-06 | End: 2020-07-06 | Stop reason: HOSPADM

## 2020-07-06 RX ORDER — DEXAMETHASONE SODIUM PHOSPHATE 4 MG/ML
INJECTION, SOLUTION INTRA-ARTICULAR; INTRALESIONAL; INTRAMUSCULAR; INTRAVENOUS; SOFT TISSUE AS NEEDED
Status: DISCONTINUED | OUTPATIENT
Start: 2020-07-06 | End: 2020-07-06 | Stop reason: HOSPADM

## 2020-07-06 RX ORDER — BUPIVACAINE HYDROCHLORIDE 5 MG/ML
INJECTION, SOLUTION EPIDURAL; INTRACAUDAL
Status: COMPLETED | OUTPATIENT
Start: 2020-07-06 | End: 2020-07-06

## 2020-07-06 RX ORDER — OXYCODONE HYDROCHLORIDE 5 MG/1
10 TABLET ORAL
Status: DISCONTINUED | OUTPATIENT
Start: 2020-07-06 | End: 2020-07-07 | Stop reason: HOSPADM

## 2020-07-06 RX ORDER — EPHEDRINE SULFATE/0.9% NACL/PF 50 MG/5 ML
SYRINGE (ML) INTRAVENOUS AS NEEDED
Status: DISCONTINUED | OUTPATIENT
Start: 2020-07-06 | End: 2020-07-06 | Stop reason: HOSPADM

## 2020-07-06 RX ORDER — ONDANSETRON 2 MG/ML
INJECTION INTRAMUSCULAR; INTRAVENOUS AS NEEDED
Status: DISCONTINUED | OUTPATIENT
Start: 2020-07-06 | End: 2020-07-06 | Stop reason: HOSPADM

## 2020-07-06 RX ORDER — CEFAZOLIN SODIUM 1 G/3ML
INJECTION, POWDER, FOR SOLUTION INTRAMUSCULAR; INTRAVENOUS AS NEEDED
Status: DISCONTINUED | OUTPATIENT
Start: 2020-07-06 | End: 2020-07-06 | Stop reason: HOSPADM

## 2020-07-06 RX ORDER — DEXAMETHASONE SODIUM PHOSPHATE 4 MG/ML
10 INJECTION, SOLUTION INTRA-ARTICULAR; INTRALESIONAL; INTRAMUSCULAR; INTRAVENOUS; SOFT TISSUE ONCE
Status: DISCONTINUED | OUTPATIENT
Start: 2020-07-06 | End: 2020-07-06 | Stop reason: HOSPADM

## 2020-07-06 RX ORDER — POLYETHYLENE GLYCOL 3350 17 G/17G
17 POWDER, FOR SOLUTION ORAL DAILY
Status: DISCONTINUED | OUTPATIENT
Start: 2020-07-06 | End: 2020-07-07 | Stop reason: HOSPADM

## 2020-07-06 RX ADMIN — Medication 20 MG: at 07:48

## 2020-07-06 RX ADMIN — DOCUSATE SODIUM - SENNOSIDES 1 TABLET: 50; 8.6 TABLET, FILM COATED ORAL at 11:48

## 2020-07-06 RX ADMIN — Medication 80 MCG: at 07:56

## 2020-07-06 RX ADMIN — DEXAMETHASONE SODIUM PHOSPHATE 10 MG: 4 INJECTION, SOLUTION INTRAMUSCULAR; INTRAVENOUS at 07:51

## 2020-07-06 RX ADMIN — PHENYLEPHRINE HYDROCHLORIDE 40 MCG/MIN: 10 INJECTION INTRAVENOUS at 08:18

## 2020-07-06 RX ADMIN — TRANEXAMIC ACID 1 G: 100 INJECTION, SOLUTION INTRAVENOUS at 09:01

## 2020-07-06 RX ADMIN — Medication 120 MCG: at 08:00

## 2020-07-06 RX ADMIN — CALCIUM 500 MG: 500 TABLET ORAL at 17:01

## 2020-07-06 RX ADMIN — PROPOFOL 100 MCG/KG/MIN: 10 INJECTION, EMULSION INTRAVENOUS at 07:48

## 2020-07-06 RX ADMIN — CEFAZOLIN 2 G: 1 INJECTION, POWDER, FOR SOLUTION INTRAMUSCULAR; INTRAVENOUS; PARENTERAL at 07:39

## 2020-07-06 RX ADMIN — ASPIRIN 325 MG: 325 TABLET, COATED ORAL at 11:57

## 2020-07-06 RX ADMIN — Medication 10 ML: at 12:01

## 2020-07-06 RX ADMIN — ACETAMINOPHEN 1000 MG: 500 TABLET ORAL at 11:48

## 2020-07-06 RX ADMIN — BUPIVACAINE HYDROCHLORIDE 10 MG: 5 INJECTION, SOLUTION EPIDURAL; INTRACAUDAL; PERINEURAL at 07:44

## 2020-07-06 RX ADMIN — Medication 80 MCG: at 08:07

## 2020-07-06 RX ADMIN — KETOROLAC TROMETHAMINE 30 MG: 30 INJECTION, SOLUTION INTRAMUSCULAR at 17:02

## 2020-07-06 RX ADMIN — DOCUSATE SODIUM - SENNOSIDES 1 TABLET: 50; 8.6 TABLET, FILM COATED ORAL at 17:01

## 2020-07-06 RX ADMIN — FAMOTIDINE 20 MG: 20 TABLET, FILM COATED ORAL at 11:57

## 2020-07-06 RX ADMIN — PREGABALIN 75 MG: 75 CAPSULE ORAL at 06:56

## 2020-07-06 RX ADMIN — CEFAZOLIN SODIUM 2 G: 1 INJECTION, POWDER, FOR SOLUTION INTRAMUSCULAR; INTRAVENOUS at 15:37

## 2020-07-06 RX ADMIN — SODIUM CHLORIDE, SODIUM LACTATE, POTASSIUM CHLORIDE, AND CALCIUM CHLORIDE 25 ML/HR: 600; 310; 30; 20 INJECTION, SOLUTION INTRAVENOUS at 06:54

## 2020-07-06 RX ADMIN — SACUBITRIL AND VALSARTAN 1 TABLET: 24; 26 TABLET, FILM COATED ORAL at 20:52

## 2020-07-06 RX ADMIN — POLYETHYLENE GLYCOL 3350 17 G: 17 POWDER, FOR SOLUTION ORAL at 11:48

## 2020-07-06 RX ADMIN — FAMOTIDINE 20 MG: 20 TABLET, FILM COATED ORAL at 17:01

## 2020-07-06 RX ADMIN — FENTANYL CITRATE 50 MCG: 50 INJECTION, SOLUTION INTRAMUSCULAR; INTRAVENOUS at 08:09

## 2020-07-06 RX ADMIN — WATER 2 G: 1 INJECTION INTRAMUSCULAR; INTRAVENOUS; SUBCUTANEOUS at 07:51

## 2020-07-06 RX ADMIN — KETOROLAC TROMETHAMINE 30 MG: 30 INJECTION, SOLUTION INTRAMUSCULAR at 11:49

## 2020-07-06 RX ADMIN — DOXYCYCLINE HYCLATE 100 MG: 100 TABLET, COATED ORAL at 13:50

## 2020-07-06 RX ADMIN — OXYCODONE 5 MG: 5 TABLET ORAL at 21:29

## 2020-07-06 RX ADMIN — TRANEXAMIC ACID 1 G: 100 INJECTION, SOLUTION INTRAVENOUS at 07:55

## 2020-07-06 RX ADMIN — Medication 80 MCG: at 08:09

## 2020-07-06 RX ADMIN — Medication 1 AMPULE: at 20:53

## 2020-07-06 RX ADMIN — Medication 120 MCG: at 07:45

## 2020-07-06 RX ADMIN — OXYCODONE 5 MG: 5 TABLET ORAL at 18:45

## 2020-07-06 RX ADMIN — FENTANYL CITRATE 50 MCG: 50 INJECTION, SOLUTION INTRAMUSCULAR; INTRAVENOUS at 08:02

## 2020-07-06 RX ADMIN — DOXYCYCLINE HYCLATE 100 MG: 100 TABLET, COATED ORAL at 20:52

## 2020-07-06 RX ADMIN — ASPIRIN 325 MG: 325 TABLET, COATED ORAL at 17:01

## 2020-07-06 RX ADMIN — OXYCODONE 5 MG: 5 TABLET ORAL at 15:36

## 2020-07-06 RX ADMIN — ONDANSETRON HYDROCHLORIDE 4 MG: 2 INJECTION, SOLUTION INTRAMUSCULAR; INTRAVENOUS at 08:50

## 2020-07-06 RX ADMIN — MIDAZOLAM HYDROCHLORIDE 2 MG: 1 INJECTION, SOLUTION INTRAMUSCULAR; INTRAVENOUS at 07:32

## 2020-07-06 RX ADMIN — ACETAMINOPHEN 1000 MG: 500 TABLET ORAL at 17:01

## 2020-07-06 RX ADMIN — SODIUM CHLORIDE 50 ML/HR: 900 INJECTION, SOLUTION INTRAVENOUS at 09:47

## 2020-07-06 RX ADMIN — Medication 120 MCG: at 07:42

## 2020-07-06 RX ADMIN — Medication 3 AMPULE: at 07:03

## 2020-07-06 RX ADMIN — Medication 1 CAPSULE: at 11:57

## 2020-07-06 NOTE — PERIOP NOTES
covid test negative on the chart pt reports that she has been sheltering in as asked and has not been around anyone with covid virus nor does she have any covid  s/s

## 2020-07-06 NOTE — PROGRESS NOTES
Problem: Mobility Impaired (Adult and Pediatric)  Goal: *Acute Goals and Plan of Care (Insert Text)  Description: FUNCTIONAL STATUS PRIOR TO ADMISSION: Patient was independent and active without use of DME.    HOME SUPPORT PRIOR TO ADMISSION: The patient lived alone with family members close by to provide assistance. She plans to have family stay with her immediately post discharge. Physical Therapy Goals  Initiated 7/6/2020  1. Patient will move from supine to sit and sit to supine  in bed with modified independence within 7 day(s). 2.  Patient will transfer from bed to chair and chair to bed with modified independence using the least restrictive device within 7 day(s). 3.  Patient will perform sit to stand with modified independence within 7 day(s). 4.  Patient will ambulate with modified independence for 200 feet with the least restrictive device within 7 day(s). 5.  Patient will ascend/descend 4 stairs with 1 handrail(s) with modified independence within 7 day(s). Outcome: Progressing Towards Goal    PHYSICAL THERAPY EVALUATION  Patient: Brisa Morrison (8 y.o. female)  Date: 7/6/2020  Primary Diagnosis: Unilateral primary osteoarthritis, right hip [M16.11]  Procedure(s) (LRB):  RIGHT TOTAL HIP ARTHROPLASTY, DIRECT ANTERIOR (Right) Day of Surgery   Precautions: avoid sudden and extreme motions         ASSESSMENT  Based on the objective data described below, the patient presents with anticipated discomfort, ROM, and strength limitations following admission for a left CISCO. BP low 916'E systolic but stable with activity. Pain was controlled during evaluation allowing for gait x75' requiring CGA and using a RW. The RW was delivered for discharge home and was adjusted for height. Pending continued pain control, anticipate great progress towards goals and progression to discharge home with HHPT tomorrow.        Patient will benefit from skilled therapy intervention to address the above noted impairments. PLAN :  Recommendations and Planned Interventions: bed mobility training, transfer training, gait training, therapeutic exercises, and neuromuscular re-education      Frequency/Duration: Patient will be followed by physical therapy:  twice daily to address goals. Recommendation for discharge: (in order for the patient to meet his/her long term goals)  Physical therapy at least 2 days/week in the home     This discharge recommendation:  Has been made in collaboration with the attending provider and/or case management    IF patient discharges home will need the following DME: rolling walker has been delivered for discharge         SUBJECTIVE:   Patient stated It feels better than I expected. It felt like it just needed to be moved.     OBJECTIVE DATA SUMMARY:   HISTORY:    Past Medical History:   Diagnosis Date    Arthritis     Cardiomyopathy ([facility] [address]     cardiologist-Dr. Sagrario Keene     Heart failure ([facility] [address]     acute systolic heart failure    Thyroid disease      Past Surgical History:   Procedure Laterality Date    HX APPENDECTOMY      HX GYN      dysplasia       Personal factors and/or comorbidities impacting plan of care:     Home Situation  Home Environment: Private residence  # Steps to Enter: 4  Rails to Enter: Yes  Hand Rails : Bilateral  Wheelchair Ramp: No  One/Two Story Residence: Two story(bathroom nad bed aust[place] floor)  Interior Rails: Right  Living Alone: Yes(someone is staying with her)  Support Systems: Family member(s)  Patient Expects to be Discharged to[de-identified] Private residence  Current DME Used/Available at Home: Raised toilet seat, Shower chair, Grab bars    EXAMINATION/PRESENTATION/DECISION MAKING:   Critical Behavior:  Neurologic State: Alert  Orientation Level: Oriented X4  Cognition: Appropriate decision making, Appropriate for age attention/concentration, Appropriate safety awareness, Follows commands     Hearing:   Auditory  Auditory Impairment: None  Skin:    Edema:   Range Of Motion:  AROM: Within functional limits                       Strength:    Strength: Generally decreased, functional                    Tone & Sensation:   Tone: Normal              Sensation: Intact               Coordination:  Coordination: Within functional limits  Vision:      Functional Mobility:  Bed Mobility:     Supine to Sit: Contact guard assistance  Sit to Supine: Contact guard assistance     Transfers:  Sit to Stand: Contact guard assistance                          Balance:   Sitting: Intact  Standing: Intact  Ambulation/Gait Training:  Distance (ft): 75 Feet (ft)  Assistive Device: Gait belt;Walker, rolling  Ambulation - Level of Assistance: Contact guard assistance        Gait Abnormalities: (flexed posture, scapular elevation)  Therapeutic Exercises: Ankle pumps, quad sets x10 each, encouraged glute sets    Functional Measure:         Physical Therapy Evaluation Charge Determination   History Examination Presentation Decision-Making   MEDIUM  Complexity : 1-2 comorbidities / personal factors will impact the outcome/ POC  LOW Complexity : 1-2 Standardized tests and measures addressing body structure, function, activity limitation and / or participation in recreation  LOW Complexity : Stable, uncomplicated  LOW Complexity : FOTO score of       Based on the above components, the patient evaluation is determined to be of the following complexity level: LOW     Pain Ratin/10 pre activity and 3/10 post activity in right hip    Activity Tolerance:   Good  Please refer to the flowsheet for vital signs taken during this treatment. After treatment patient left in no apparent distress:   Supine in bed and Call bell within reach    COMMUNICATION/EDUCATION:   The patients plan of care was discussed with: Registered nurse. Fall prevention education was provided and the patient/caregiver indicated understanding., Patient/family have participated as able in goal setting and plan of care. , and Patient/family agree to work toward stated goals and plan of care.     Thank you for this referral.  Marianna Hinton, PT, DPT   Time Calculation: 25 mins

## 2020-07-06 NOTE — PERIOP NOTES
Handoff Report from Operating Room to PACU    Report received from Jassi Abraham CRNA  regarding Xiang Zamudio. Surgeon(s):  Tessa Wisdom DO  And Procedure(s) (LRB):  RIGHT TOTAL HIP ARTHROPLASTY, DIRECT ANTERIOR (Right)  confirmed   with allergies and dressings discussed. Anesthesia type, drugs, patient history, complications, estimated blood loss, vital signs, intake and output, and last pain medication, lines, reversal medications and temperature were reviewed. 3240- Order clarified to change IVF to 50 cc / hr per Dr. Lena Woods. Dr. Lena Woods updates family. 0935- Xray at bedside    1048- TRANSFER - OUT REPORT:    Verbal report given to [facility] RN(name) on Xiang Zamudio  being transferred to [phone]  (unit) for routine post - op       Report consisted of patients Situation, Background, Assessment and   Recommendations(SBAR). Information from the following report(s) SBAR, Procedure Summary, Intake/Output, MAR and Cardiac Rhythm SR was reviewed with the receiving nurse. Lines:   Peripheral IV 07/06/20 Left;Posterior Hand (Active)   Site Assessment Clean, dry, & intact 7/6/2020  9:11 AM   Phlebitis Assessment 0 7/6/2020  9:11 AM   Infiltration Assessment 0 7/6/2020  9:11 AM   Dressing Status Clean, dry, & intact 7/6/2020  9:11 AM   Dressing Type Tape;Transparent 7/6/2020  9:11 AM   Hub Color/Line Status Pink; Infusing 7/6/2020  9:11 AM        Opportunity for questions and clarification was provided.       Patient transported with:   Registered Nurse  Tech   All personal belongings including cell phone  Karen Moore updated on patient and made aware of new room number

## 2020-07-06 NOTE — DISCHARGE INSTRUCTIONS
Discharge Instructions: How to [facility]  Surgery: Total Hip Replacement  Surgeon:   Fran Alan DO  Surgery Date:  7/6/2020     To relieve pain:   Use ice/gel packs.  Put the ice pack directly over the wound, or anywhere you are hurting or swollen.  To control pain and swelling, keep ice on regularly, especially after physical activity.  The packs should stay cold for 3-4 hours. When it is not cold anymore, rotate with the packs in the freezer.  Elevate your leg. This will also keep swelling down.  Rest for at least 20 minutes between activity or exercises.  To keep track of your pain medications, write down what you take and when you take it.  The last dose of pain medication you got in the hospital was:       Medication    Dose    Date & Time           Choose your medications based on the pain scale below:     To keep your pain under control, take Tylenol every 6 hours for 14 days - even if you feel like you dont need it.  For mild to moderate pain (4-6 on pain scale), take one pain pill every 3-4 hours as needed.  For severe pain (7-10 on pain scale), take two pain pills every 3-4 hours as needed.  To prevent nausea, take your pain medications with food. Pain Scale              As your pain lessens:     Slowly start taking less pain medication. You may do this by waiting longer between doses or by taking smaller doses.  Stop using the pain medications as soon as you no longer need it, usually in 2-3 weeks.  Aspirin   To prevent blood clots, you will need to take Aspirin 325 mg twice a day for 30 days.  To prevent stomach upset or bleeding:   Do not take non-steroidal anti-inflammatory medications (Ibuprofen, Advil, Motrin, Naproxen, etc.)    Take Pepcid 20 mg twice a day, or a similar home medication, while you are taking a blood thinner. STERI-STRIPS AND TEGADERM/CLEAR DRESSING INSTRUCTIONS      Keep your clear, waterproof dressing in place after your leave the hospital.     If you are still having drainage, you will need to change your dressing in 5-7 days. You will be given one extra dressing to use at home.  If there is minimal to no more drainage from the wound, leave the original dressing in place. Your surgeon will remove the dressing at your follow-up appointment.  You will have some swelling, warmth, and bruising around the knee and up and down the leg after surgery. This will may get worse in the first few days you are home and will slowly get better over the next few weeks.  DO NOTs:   Do not rub your surgical wound   Do not put lotion or oils on your wound.  Do not take a tub bath or go swimming until your doctor says it is ok.  You may shower with this dressing over your wound.  After showering pat the dressing dry.  If you have staples a home health nurse will remove them in about 10 days.  To increase and promote healing:     Stop Smoking (or at least cut back on       Smoking).  Eat a well-balanced diet (high in protein       and vitamin C).  If you have a poor appetite, drink Ensure, Glucerna, or [place] Instant Breakfast for the next 30 days.  If you are diabetic, you should control your blood                                                sugars to prevent infection and help your wound                                                to heal.     Prevent Infection    1. Wash your hands.  This is the most important thing you or your caregiver can do.  Wash your hands with soap and water (or use the hand  we gave you) before you touch any wounds. 2. Shower.  Use the antibacterial soap we gave you when you take a shower.  Shower with this soap until your wounds are healed. 3.  Use clean sheets.      Put freshly cleaned sheets on your bed after surgery.  To keep the surgery site clean, do not allow pets to sleep with you while your wound is still healing.  To prevent constipation, stay active and drink plenty of fluid.  While using pain medications, you should also take stool softeners and laxatives, such as Pericolace       and Miralax.  If you are having too many bowel       Movements, then you may need       to stop taking the laxatives.  You should have a bowel movement 3-4 days        after surgery and then at least every other day while       on pain medication.  To improve your recovery, you must be active!  Use your walker and take short walks         (in your home). about every 2 hours during the day.  Try to increase how far you walk each day.  You can put as much weight on your leg as you can tolerate while walking.  Home health physical therapy will come to your       home the day after you leave the hospital.  The       therapist will visit about 4 times over the next couple of       weeks to teach you how to get out of bed, to safely walk       in your home, and to do your exercises.  NO DRIVING until your surgeon tells you it is ok.  You can return to work when cleared by a physician.  Please call your physician immediately if you have:     Constant bleeding from your wound.  Increasing redness or swelling around your wound (Some warmth, bruising and swelling is normal).  Change in wound drainage (increase in amount, color, or bad smell).  Change in mental status (unusual behavior   Temperature over 101.5 degrees Fahrenheit      Pain or redness in the calf (back of your lower leg - see picture)     Increased swelling of the thigh, ankle, calf, or foot.      Emergency: CALL 911 if you have:     Shortness of breath     Chest pain when you cough or taking a deep breath     Please call your surgeons office at [phone] for a follow up appointment. _   You should call as soon as you get home or the next day after discharge. Ask to make an appointment in 2 weeks.  If you have questions or concerns during normal business hours, you may reach Dr. Stew Alonzo at [phone].

## 2020-07-06 NOTE — PROGRESS NOTES
Ortho / Neurosurgery NP Note    POD# 0  s/p RIGHT TOTAL HIP ARTHROPLASTY, DIRECT ANTERIOR     Pt resting in bed, eating lunch. Pain well controlled, 3/10. Tolerating regular diet. VSS Afebrile. Room air. BP soft   Denies dizziness, sob, CP    Visit Vitals  BP 99/54 (BP 1 Location: Right arm, BP Patient Position: At rest)   Pulse 65   Temp 97.5 °F (36.4 °C)   Resp 16   Ht 5' 7\" (1.702 m)   Wt 95.1 kg (209 lb 10.5 oz)   SpO2 97%   BMI 32.84 kg/m²       Voiding status: due to void  Output (mL)  Last Bowel Movement Date: 07/06/20 (07/06/20 1105)  Unmeasurable Output  Urine Occurrence(s): 1(voided in bathroom  ) (07/06/20 0640)      Labs    Lab Results   Component Value Date/Time    HGB 13.4 06/26/2020 10:06 AM      Lab Results   Component Value Date/Time    INR 1.0 06/29/2020 11:30 AM      Lab Results   Component Value Date/Time    Sodium 139 06/29/2020 11:30 AM    Potassium 3.9 06/29/2020 11:30 AM    Chloride 107 06/29/2020 11:30 AM    CO2 26 06/29/2020 11:30 AM    Glucose 96 06/29/2020 11:30 AM    BUN 15 06/29/2020 11:30 AM    Creatinine 0.54 (L) 06/29/2020 11:30 AM    Calcium 8.8 06/29/2020 11:30 AM     Recent Glucose Results:   Lab Results   Component Value Date/Time    GLUCPOC 125 (H) 07/06/2020 11:55 AM    GLUCPOC 74 07/06/2020 06:46 AM           Body mass index is 32.84 kg/m². : A BMI > 30 is classified as obesity and > 40 is classified as morbid obesity. Steristrips and tegaderm dressing c.d.i  Cryotherapy in place over incision  Calves soft and supple; No pain with passive stretch  Sensation and motor intact - PF/DF/EHL intact 5/5  SCDs for mechanical DVT proph while in bed     PLAN:  1) PT BID, OT - WBAT   2) Aspirin 325 mg PO BID for DVT Prophylaxis   3) GI Prophylaxis - Pepcid  4) CHF - recently diagnosed. Continue home meds on admission as BP allows.    5) Readniess for discharge:     [x] Vital Signs stable    [] Hgb stable    [] + Voiding    [x] Wound intact, drainage minimal    [x] Tolerating PO intake     [] Cleared by PT (OT if applicable)     [] Stair training completed (if applicable)    [] Independent / Contact Guard Assist (household distance)     [] Bed mobility     [] Car transfers     [] ADLs    [x] Adequate pain control on oral medication alone     Discharge pending voiding status & PT/OT clearance. Plans to return home with support of niece.      [place] NP

## 2020-07-06 NOTE — ANESTHESIA PREPROCEDURE EVALUATION
Relevant Problems   No relevant active problems       Anesthetic History   No history of anesthetic complications            Review of Systems / Medical History  Patient summary reviewed, nursing notes reviewed and pertinent labs reviewed    Pulmonary          Smoker      Comments: Former Smoker - 15 pack years   Neuro/Psych              Cardiovascular          CHF        Exercise tolerance: <4 METS  Comments: Cardiomyopathy, nonischemic  Systolic CHF, chronic   Echo 3/2720: EF 20-25%     GI/Hepatic/Renal  Within defined limits              Endo/Other      Hypothyroidism  Obesity and arthritis     Other Findings              Physical Exam    Airway  Mallampati: II    Neck ROM: normal range of motion   Mouth opening: Normal     Cardiovascular  Regular rate and rhythm,  S1 and S2 normal,  no murmur, click, rub, or gallop             Dental      Comments: Some missing, loose lower incisor, generally poor dentition   Pulmonary  Breath sounds clear to auscultation               Abdominal  GI exam deferred       Other Findings            Anesthetic Plan    ASA: 4  Anesthesia type: spinal            Anesthetic plan and risks discussed with: Patient

## 2020-07-06 NOTE — OP NOTES
Date of Procedure: 7/6/2020  PREOPERATIVE DIAGNOSIS: Right hip osteoarthritis. POSTOPERATIVE DIAGNOSIS: same  OPERATION: Right total hip arthroplasty. ASSISTANT SURGEON: none  ANESTHESIA: spinal.  ESTIMATED BLOOD LOSS: 200 mL. COMPLICATIONS: None. SPECIMEN: None. DRAINS: None. IMPLANTS:     [place] Trident hemispherical acetabular shell 54 mm outer diameter   [place] trident X3 0 degree polyethylene insert 54 mm inner diameter   Biolox Delta ceramic v40 femoral head 36 mm outer diameter  -5 Neck length   Accolade 2, 127 degree neck angle, hip stem size 4 with a V40 taper. OPERATIVE INDICATIONS: This is a 61year old female who failed  nonoperative management of right hip osteoarthrosis. We did discuss the risks  of surgery which include but are not limited to infection, nerve or blood  vessel damage, need for reoperation, disability, DVT, PE, postoperative pain,  swelling, stiffness, intra or postoperative fracture, leg length inequality    and postoperative dislocation, femoral and sciatic nerve palsies, lateral femoral  cutaneous nerve injury, wound healing complications, all other organ  disfunction. The patient did freely consent for surgery. DESCRIPTION OF PROCEDURE IN DETAIL: After the correct side and site were  identified by myself in the holding area, the patient was transported to the  surgical suite where after successful induction of spinal anesthesia, the  patient was transferred to the [facility] table where all bony prominences were  padded. The right hip was then prepped and draped in usual sterile orthopedic  fashion. After an appropriate time out and confirmation the 2 grams of Ancef  had been infused prior to any incision, an incision was made beginning 2 cm  lateral to and distal to the ASIS directly laterally and distally by about 8  cm. The tissues were dissected sharply down to fascia and strict hemostasis  was maintained with the use of electrocautery.  The deep fascia was incised  sharply and the tensor of the fascia connie was bluntly elevated and mobilized. A superolateral retractor was then placed. The deep fascia was divided and the ascending branch of the lateral femoral circumflex artery was identified, coagulated and divided. An inferior medial retractor was then placed giving good visualization of the anterior  capsule. The iliocapsularis was then elevated off of the anterior capsule  and an anterior column retractor was placed. A capsulectomy was performed  anteriorly. Once the capsulectomy was performed,  retractors were placed intra-articularly and an osteotomy was performed in  the femoral neck in line with the preoperative template. Once this was  performed, the femoral head was removed with use of a powered corkscrew. The retractors were placed inside of capsule, outside of labrum and the  circumferential labrectomy was performed. The Pulvinar was excised with use  of electrocautery. The medial tissues were infused with 0.5% Ropivacaine. Once this was performed, a small reamer was then used to medialize  the acetabulum to its true floor. With that, the reamer was increased in  size and then placed in the direction of the anatomic direction of the  acetabulum. Further reaming was performed and care was taken to maintain good medial acetabular wall integrity. I reamed until the instrument had excellent  as well as a bed of bleeding cancellous bone. I, therefore, irrigated the acetabular recess and dried it with a lap and  impacted into place a final shell into place at   43 degrees of abduction as well as 15 degrees of anteversion under direct fluoroscopic visualization. I checked stability of the shell. With that, the  stability was tested and it was found to have an excellent scratch fit. I  therefore irrigated the wound and impacted into a place a 0 degree  polyethylene insert. This did have excellent engagement of the locking  mechanism.  This was tested as well.     The retractors were then removed and the leg was placed in extended, adducted, externally rotated position and retractors were placed about the proximal femur. The limited capsulotomy was performed at the greater trochanter and this did allow the proximal femur to  elevate up and out of the wound. I then used the box osteotome to clear off  metaphysical bone and the lateral cortical bone was then removed with the use of a rongeur. The canal entry broach was then used. Sequential broaching was then performed until I had good axial and rotational control. I used the calcar planer to clear off irregularities in the calcar. I trial reduced the hip with a 0 degree ball. X-rays were taken and  demonstrated excellent positioning of the femoral component. I redislocated the joint. I removed  the head and neck as well as the broach and impacted into place a  Final permanent Accolade 2 127 degree neck angle hip stem. This did have excellent axial and  rotational control. The calcar was checked and there were no cracks or other  bone deficiencies. I, therefore, trialed a ball which did have excellent  reconstitution of the length and offset. I, therefore, redislocated, cleaned  and dried the trunnion and impacted into place our final ball with seven sharp blows of the mallet. This did have excellent fixation. I therefore relocated the joint. Final  x-rays were taken demonstrating good positioning of all components as well as  stability and no fractures were noted. Stability testing was performed with the leg at neutral, then externally rotated 90 degrees, then externally rotated at 45 degrees combined with leg extension. The hip was stable through this range of motion. The wound was then copiously  irrigated with normal saline mixed with Betadine. Soft tissues were then  infused with 0.5% Ropivacaine. The fascia was then closed with a running #1  Vicryl suture. Deep stitches were placed.  Then, 2-0 Vicryl, 3-0 Monocryl,  Dermabond, and Steri-Strips were applied. A sterile dressing was applied. The patient was then taken off of the table and taken to PACU in stable  condition with no obvious intraoperative complications. POSTOPERATIVE PLAN: The patient will be weightbearing as tolerated. They will  have ecotrin for DVT prophylaxis for  1 month. Pain control will be with Percocet. Colace for bowel maintenance. The patient will follow up in our office in 2 weeks and then 6 weeks for repeat clinical and  radiographic checks per our normal protocol.

## 2020-07-06 NOTE — ANESTHESIA POSTPROCEDURE EVALUATION
Procedure(s):  RIGHT TOTAL HIP ARTHROPLASTY, DIRECT ANTERIOR. spinal    Anesthesia Post Evaluation        Patient location during evaluation: PACU  Note status: Adequate. Level of consciousness: responsive to verbal stimuli and sleepy but conscious  Pain management: satisfactory to patient  Airway patency: patent  Anesthetic complications: no  Cardiovascular status: acceptable  Respiratory status: acceptable  Hydration status: acceptable  Comments: +Post-Anesthesia Evaluation and Assessment    Patient: Fabiano Granados MRN: [id number]  SSN: [id number]   YOB: 1960  Age: 61 y.o. Sex: female      Cardiovascular Function/Vital Signs    BP 97/50 (BP 1 Location: Right arm, BP Patient Position: At rest)   Pulse 62   Temp 36.5 °C (97.7 °F)   Resp 18   Ht 5' 7\" (1.702 m)   Wt 95.1 kg (209 lb 10.5 oz)   SpO2 95%   BMI 32.84 kg/m²     Patient is status post Procedure(s):  RIGHT TOTAL HIP ARTHROPLASTY, DIRECT ANTERIOR. Nausea/Vomiting: Controlled. Postoperative hydration reviewed and adequate. Pain:  Pain Scale 1: Numeric (0 - 10) (07/06/20 1030)  Pain Intensity 1: 0 (07/06/20 1030)   Managed. Neurological Status:   Neuro (WDL): Exceptions to WDL (07/06/20 0911)   At baseline. Mental Status and Level of Consciousness: Arousable. Pulmonary Status:   O2 Device: Room air (07/06/20 1030)   Adequate oxygenation and airway patent. Complications related to anesthesia: None    Post-anesthesia assessment completed. No concerns. Signed By: Jane Mobley MD    7/6/2020  Post anesthesia nausea and vomiting:  controlled      INITIAL Post-op Vital signs:   Vitals Value Taken Time   BP 97/50 7/6/2020 10:30 AM   Temp 36.5 °C (97.7 °F) 7/6/2020 10:00 AM   Pulse 66 7/6/2020 10:45 AM   Resp 18 7/6/2020 10:45 AM   SpO2 97 % 7/6/2020 10:45 AM   Vitals shown include unvalidated device data.

## 2020-07-06 NOTE — PROGRESS NOTES
MANDO  1) home with home health   2) niece to provide transportation at d/c  3) Patient clear for d/c from CM standpoint     Reason for Admission:  Right total hip arthroplasty                     RUR Score:  7%                   Plan for utilizing home health: patient agreeable to [facility] services          PCP: First and Last name:  Dr. Faith Peterson   Name of Practice: [place] [id number].   Are you a current patient: Yes/No: yes   Approximate date of last visit: 6/26/2020   Can you participate in a virtual visit with your PCP: yes                    Current Advanced Directive/Advance Care Plan: patient reported that she was provided with an ACP hardcopy at PCP but had a page missing. CM provided patient with complete copy of ACP. Patient reported she wanted to complete it later with family. Transition of Care Plan:                      Patient is a 62 y/o female who was admitted to [address] [place] [address] on 7/6/2020 for a planned right total hip arthroplasty. CM made room visit with patient who was alert and oriented. Patient confirmed demographics, insurance, and emergency contact on file. Patient uses [facility] on [address]. Patient resides alone in a two story home, bedroom on main level, with 4 MARIELY. Patient reported that she has a supportive niece and boyfriend. Patient's niece will be staying with patient during the day and patient's boyfriend will be staying with patient in the evenings/nights. Patient has a shower chair and raised toilet seat at home. Patient does not have a RW and will need one prior to d/c. At baseline patient is independent with ADLs and driving. Patient denied any history of HH or SNF/IPR. Patient's plan is to d/c home with home health. Patient requested to use [facility] for [facility] services. Referral sent to [facility] and they have accepted patient. AVS updated. Referral sent to [facility] for RW and they approved patient for RW and has been delivered to patient's room. Patient's niece to provide transportation at d/c. Oral and Written notification given to patient and/or caregiver informing them that they are currently an Outpatient receiving care in our facility. Outpatient services include [facility]. Care Management Interventions  PCP Verified by CM: Yes  Last Visit to PCP: 06/26/20  Palliative Care Criteria Met (RRAT>21 & CHF Dx)?: No  Mode of Transport at Discharge:  Other (see comment)(niece)  Transition of Care Consult (CM Consult): Discharge Planning, [address]: Yes  Discharge Durable Medical Equipment: No  Physical Therapy Consult: Yes  Occupational Therapy Consult: Yes  Speech Therapy Consult: No  Current Support Network: Lives Alone, Family Lives Nearby, Own Home(patient resides alone in a two story home with 4 MARIELY)  Confirm Follow Up Transport: Family  Discharge Location  Discharge Placement: Home with home health    Freeman Lopez, [address]

## 2020-07-06 NOTE — ANESTHESIA PROCEDURE NOTES
Spinal Block    Performed by: Reynaldo Guthrie MD  Authorized by: Reynaldo Guthrie MD     Pre-procedure: Indications: primary anesthetic  Preanesthetic Checklist: risks and benefits discussed and timeout performed      Spinal Block:   Patient Position:  Seated  Prep Region:  Lumbar  Prep: chlorhexidine      Location:  L2-3  Technique:  Single shot        Needle:   Needle Type:  Pencan  Needle Gauge:  25 G  Attempts:  1      Events: CSF confirmed, no blood with aspiration and no paresthesia        Assessment:  Insertion:  Uncomplicated  Patient tolerance:  Patient tolerated the procedure well with no immediate complications  10 mg bupivacaine given intrathecally.

## 2020-07-06 NOTE — PERIOP NOTES
Irrwespt Wound Debridement and Cleansing System  Ref: Shauna Franc: [id number] LOT: [id number] Expiration Date: 03/31/2022

## 2020-07-07 VITALS
RESPIRATION RATE: 14 BRPM | TEMPERATURE: 98.1 F | HEART RATE: 73 BPM | SYSTOLIC BLOOD PRESSURE: 105 MMHG | WEIGHT: 209.66 LBS | BODY MASS INDEX: 32.91 KG/M2 | OXYGEN SATURATION: 94 % | HEIGHT: 67 IN | DIASTOLIC BLOOD PRESSURE: 61 MMHG

## 2020-07-07 LAB
ANION GAP SERPL CALC-SCNC: 5 MMOL/L (ref 5–15)
BUN SERPL-MCNC: 24 MG/DL (ref 6–20)
BUN/CREAT SERPL: 46 (ref 12–20)
CALCIUM SERPL-MCNC: 8.5 MG/DL (ref 8.5–10.1)
CHLORIDE SERPL-SCNC: 109 MMOL/L (ref 97–108)
CO2 SERPL-SCNC: 25 MMOL/L (ref 21–32)
CREAT SERPL-MCNC: 0.52 MG/DL (ref 0.55–1.02)
GLUCOSE BLD STRIP.AUTO-MCNC: 113 MG/DL (ref 65–100)
GLUCOSE BLD STRIP.AUTO-MCNC: 145 MG/DL (ref 65–100)
GLUCOSE SERPL-MCNC: 117 MG/DL (ref 65–100)
HGB BLD-MCNC: 11.5 G/DL (ref 11.5–16)
POTASSIUM SERPL-SCNC: 4.1 MMOL/L (ref 3.5–5.1)
SERVICE CMNT-IMP: ABNORMAL
SERVICE CMNT-IMP: ABNORMAL
SODIUM SERPL-SCNC: 139 MMOL/L (ref 136–145)

## 2020-07-07 PROCEDURE — 97165 OT EVAL LOW COMPLEX 30 MIN: CPT | Performed by: OCCUPATIONAL THERAPIST

## 2020-07-07 PROCEDURE — 97110 THERAPEUTIC EXERCISES: CPT

## 2020-07-07 PROCEDURE — 74011250637 HC RX REV CODE- 250/637: Performed by: ORTHOPAEDIC SURGERY

## 2020-07-07 PROCEDURE — 85018 HEMOGLOBIN: CPT

## 2020-07-07 PROCEDURE — 97530 THERAPEUTIC ACTIVITIES: CPT | Performed by: OCCUPATIONAL THERAPIST

## 2020-07-07 PROCEDURE — 74011250637 HC RX REV CODE- 250/637: Performed by: NURSE PRACTITIONER

## 2020-07-07 PROCEDURE — 74011000250 HC RX REV CODE- 250: Performed by: ORTHOPAEDIC SURGERY

## 2020-07-07 PROCEDURE — 97535 SELF CARE MNGMENT TRAINING: CPT | Performed by: OCCUPATIONAL THERAPIST

## 2020-07-07 PROCEDURE — 74011250636 HC RX REV CODE- 250/636: Performed by: ORTHOPAEDIC SURGERY

## 2020-07-07 PROCEDURE — 97116 GAIT TRAINING THERAPY: CPT

## 2020-07-07 PROCEDURE — 94760 N-INVAS EAR/PLS OXIMETRY 1: CPT

## 2020-07-07 PROCEDURE — 80048 BASIC METABOLIC PNL TOTAL CA: CPT

## 2020-07-07 PROCEDURE — 82962 GLUCOSE BLOOD TEST: CPT

## 2020-07-07 PROCEDURE — 36415 COLL VENOUS BLD VENIPUNCTURE: CPT

## 2020-07-07 PROCEDURE — 99218 HC RM OBSERVATION: CPT

## 2020-07-07 RX ORDER — ASPIRIN 325 MG
325 TABLET, DELAYED RELEASE (ENTERIC COATED) ORAL 2 TIMES DAILY
Qty: 60 TAB | Refills: 0 | Status: SHIPPED | OUTPATIENT
Start: 2020-07-07 | End: 2020-08-06

## 2020-07-07 RX ORDER — FAMOTIDINE 20 MG/1
20 TABLET, FILM COATED ORAL 2 TIMES DAILY
Qty: 60 TAB | Refills: 0 | Status: SHIPPED | OUTPATIENT
Start: 2020-07-07 | End: 2020-08-06

## 2020-07-07 RX ORDER — ACETAMINOPHEN 500 MG
1000 TABLET ORAL EVERY 6 HOURS
Qty: 112 TAB | Refills: 0 | Status: SHIPPED | OUTPATIENT
Start: 2020-07-07 | End: 2020-07-21

## 2020-07-07 RX ORDER — POLYETHYLENE GLYCOL 3350 17 G/17G
17 POWDER, FOR SOLUTION ORAL
Qty: 15 PACKET | Refills: 0 | Status: SHIPPED | OUTPATIENT
Start: 2020-07-07 | End: 2020-07-22

## 2020-07-07 RX ORDER — AMOXICILLIN 250 MG
1 CAPSULE ORAL DAILY
Qty: 30 TAB | Refills: 0 | Status: SHIPPED | OUTPATIENT
Start: 2020-07-07

## 2020-07-07 RX ORDER — OXYCODONE HYDROCHLORIDE 5 MG/1
5-10 TABLET ORAL
Qty: 60 TAB | Refills: 0 | Status: SHIPPED | OUTPATIENT
Start: 2020-07-07 | End: 2020-07-20 | Stop reason: SDUPTHER

## 2020-07-07 RX ADMIN — DOXYCYCLINE HYCLATE 100 MG: 100 TABLET, COATED ORAL at 08:17

## 2020-07-07 RX ADMIN — OXYCODONE 5 MG: 5 TABLET ORAL at 06:18

## 2020-07-07 RX ADMIN — KETOROLAC TROMETHAMINE 30 MG: 30 INJECTION, SOLUTION INTRAMUSCULAR at 00:14

## 2020-07-07 RX ADMIN — Medication 1 CAPSULE: at 08:17

## 2020-07-07 RX ADMIN — KETOROLAC TROMETHAMINE 30 MG: 30 INJECTION, SOLUTION INTRAMUSCULAR at 06:18

## 2020-07-07 RX ADMIN — ASPIRIN 325 MG: 325 TABLET, COATED ORAL at 08:18

## 2020-07-07 RX ADMIN — Medication 1 AMPULE: at 08:23

## 2020-07-07 RX ADMIN — OXYCODONE 5 MG: 5 TABLET ORAL at 11:05

## 2020-07-07 RX ADMIN — POLYETHYLENE GLYCOL 3350 17 G: 17 POWDER, FOR SOLUTION ORAL at 08:18

## 2020-07-07 RX ADMIN — CEFAZOLIN SODIUM 2 G: 1 INJECTION, POWDER, FOR SOLUTION INTRAMUSCULAR; INTRAVENOUS at 00:14

## 2020-07-07 RX ADMIN — OXYCODONE 5 MG: 5 TABLET ORAL at 03:43

## 2020-07-07 RX ADMIN — Medication 10 ML: at 06:19

## 2020-07-07 RX ADMIN — ACETAMINOPHEN 1000 MG: 500 TABLET ORAL at 00:14

## 2020-07-07 RX ADMIN — DOCUSATE SODIUM - SENNOSIDES 1 TABLET: 50; 8.6 TABLET, FILM COATED ORAL at 08:18

## 2020-07-07 RX ADMIN — OXYCODONE 5 MG: 5 TABLET ORAL at 00:14

## 2020-07-07 RX ADMIN — LEVOTHYROXINE SODIUM 137 MCG: 112 TABLET ORAL at 06:24

## 2020-07-07 RX ADMIN — ACETAMINOPHEN 1000 MG: 500 TABLET ORAL at 06:18

## 2020-07-07 RX ADMIN — SODIUM CHLORIDE 50 ML/HR: 900 INJECTION, SOLUTION INTRAVENOUS at 08:17

## 2020-07-07 RX ADMIN — FAMOTIDINE 20 MG: 20 TABLET, FILM COATED ORAL at 08:17

## 2020-07-07 RX ADMIN — SACUBITRIL AND VALSARTAN 1 TABLET: 24; 26 TABLET, FILM COATED ORAL at 11:05

## 2020-07-07 NOTE — PROGRESS NOTES
Bedside and Verbal shift change report given to Ze RIBERA RN (oncoming nurse) by Carlos A Lerma (offgoing nurse). Report included the following information SBAR, Kardex, OR Summary, Procedure Summary, Intake/Output, MAR, Recent Results and Med Rec Status.

## 2020-07-07 NOTE — PROGRESS NOTES
OCCUPATIONAL THERAPY EVALUATION/DISCHARGE  Patient: Alex Carrillo (89 y.o. female)  Date: 7/7/2020  Primary Diagnosis: Unilateral primary osteoarthritis, right hip [M16.11]  Procedure(s) (LRB):  RIGHT TOTAL HIP ARTHROPLASTY, DIRECT ANTERIOR (Right) 1 Day Post-Op   Precautions: R anterior CISCO precautions. ASSESSMENT  Based on the objective data described below, the patient presents with decreased standing balance, as well as post op decreased R hip ROM and expected pain which is impacting her functional independence. Upon completion of all training and education provided during this evaluation the patient was able to perform ADLs and functional mobility at an independent to supervision level. Patient noted to demonstrate good overall safety awareness during functional mobility, standing ADLs and when performing ADL setup and mobilizing with a RW. Further skilled acute occupational therapy is not indicated at this time. Functional Outcome Measure: The patient scored 90/100 on the Barthel Index outcome measure which is indicative of a a 10 % decline in function. PLAN :  Recommendation for discharge: (in order for the patient to meet his/her long term goals)  No skilled occupational therapy/ follow up rehabilitation needs identified at this time. Equipment recommendations for successful discharge: none, has needed DME       OBJECTIVE DATA SUMMARY:   HISTORY:   Past Medical History:   Diagnosis Date    Arthritis     Cardiomyopathy ([facility]     cardiologist-Dr. Ramos Clemens     Heart failure ([facility]     acute systolic heart failure    Thyroid disease      Past Surgical History:   Procedure Laterality Date    HX APPENDECTOMY      HX GYN      dysplasia       Prior Level of Function/Environment/Context: Independent and working as a .    Expanded or extensive additional review of patient history:   Home Situation  Home Environment: Private residence  # Steps to Enter: 4  Rails to Enter: Yes  Hand Rails : Bilateral  Wheelchair Ramp: No  One/Two Story Residence: Two story(bathroom nad bed [place] floor)  Interior Rails: Right  Living Alone: Yes(someone is staying with her)  Support Systems: Family member(s)  Patient Expects to be Discharged to[de-identified] Private residence  Current DME Used/Available at Home: Raised toilet seat, Shower chair, Grab bars    Hand dominance: Right    EXAMINATION OF PERFORMANCE DEFICITS:  Cognitive/Behavioral Status:  Neurologic State: Alert  Orientation Level: Oriented X4  Cognition: Appropriate for age attention/concentration; Appropriate decision making; Appropriate safety awareness; Follows commands        Safety/Judgement: Awareness of environment; Insight into deficits    Hearing: Auditory  Auditory Impairment: None    Vision/Perceptual:         Acuity: Within Defined Limits    Corrective Lenses: Reading glasses    Range of Motion:  AROM: Within functional limits      Strength:  Strength: Within functional limits      Coordination:  Coordination: Within functional limits  Fine Motor Skills-Upper: Left Intact; Right Intact    Gross Motor Skills-Upper: Left Intact; Right Intact    Tone & Sensation:  Tone: Normal  Sensation: Intact          Balance:  Sitting: Intact  Standing: Impaired; With support  Standing - Static: Good  Standing - Dynamic : Good    Functional Mobility and Transfers for ADLs:  Bed Mobility:  Rolling: Independent  Supine to Sit: Independent  Scooting: Independent    Transfers:  Sit to Stand: Modified independent  Stand to Sit: Modified independent  Bed to Chair: Modified independent(ambulating with a RW)  Bathroom Mobility: Modified independent(ambulating with a RW)  Toilet Transfer : Modified independent  Tub Transfer: Supervision    ADL Assessment:  Feeding: Independent    Oral Facial Hygiene/Grooming: Independent    Bathing: Supervision    Upper Body Dressing: Independent    Lower Body Dressing: Modified independent    Toileting: Modified independent ADL Intervention and task modifications:  Grooming  Position Performed: Standing  Washing Hands: Independent  Brushing Teeth: Independent    Upper Body Bathing  Bathing Assistance: Supervision;Set-up  Position Performed: Seated edge of bed    Lower Body Bathing  Bathing Assistance: Supervision;Set-up  Perineal  : Supervision;Set-up  Position Performed: Standing  Lower Body : Supervision;Set-up  Position Performed: Seated edge of bed;Standing    Upper Body Dressing Assistance  Pullover Shirt: Independent    Lower Body Dressing Assistance  Underpants: Modified independent; Compensatory technique training  Pants With Elastic Waist: Modified independent; Compensatory technique training  Slip on Shoes with Back: Modified independent  Position Performed: Seated edge of bed;Standing;Bending forward method    Toileting  Toileting Assistance: Modified independent  Bladder Hygiene: Independent  Clothing Management: Independent    Cognitive Retraining  Safety/Judgement: Awareness of environment; Insight into deficits    Provided safety training related to the performance of retrieving ADL items, when ambulating with a RW. Patient instructed to keep walker in front of her when at her dresser and opening a drawer, as well as when reaching into closet for needed items. Instructed patient and family to have needed items placed at a level between shoulder and knee height , so they can be easily accessed to minimize risk for LOB. Recommended draping clothing items over front of RW, and to purchase a walker bag to carry needed items, in order to transport items to where she would perform dressing ADLs. Functional Measure:  Barthel Index:    Bathin  Bladder: 10  Bowels: 10  Groomin  Dressing: 10  Feeding: 10  Mobility: 15  Stairs: 5  Toilet Use: 10  Transfer (Bed to Chair and Back): 15  Total: 90/100        The Barthel ADL Index: Guidelines  1.  The index should be used as a record of what a patient does, not as a record of what a patient could do. 2. The main aim is to establish degree of independence from any help, physical or verbal, however minor and for whatever reason. 3. The need for supervision renders the patient not independent. 4. A patient's performance should be established using the best available evidence. Asking the patient, friends/relatives and nurses are the usual sources, but direct observation and common sense are also important. However direct testing is not needed. 5. Usually the patient's performance over the preceding 24-48 hours is important, but occasionally longer periods will be relevant. 6. Middle categories imply that the patient supplies over 50 per cent of the effort. 7. Use of aids to be independent is allowed. Pernell Saravia., Barthel, D.W. ([id number]). Functional evaluation: the Barthel Index. [facility] (14)2. [facility] ana ADORE Perez, Eloise Padron., [facility] [place], [address] [place] [address] (1999). Measuring the change indisability after inpatient rehabilitation; comparison of the responsiveness of the Barthel Index and Functional Welcome Measure. Journal of Neurology, Neurosurgery, and Psychiatry, 66(4), 861-145. Irma Ramírez, N.J.A, KASSANDRA Romo.LASHAY, & Rafael Crouch, M.A. (2004.) Assessment of post-stroke quality of life in cost-effectiveness studies: The usefulness of the Barthel Index and the EuroQoL-5D. Quality of Life Research, 13, 427-43      Pain Rating:  Expected post op R hip pain    Left patient with ice pack on R hip    Activity Tolerance:   Good  Please refer to the flowsheet for vital signs taken during this treatment. After treatment patient left in no apparent distress:    Sitting in chair and Call bell within reach    COMMUNICATION/EDUCATION:   The patients plan of care was discussed with: Physical Therapy Assistant, Registered Nurse and NP.     Thank you for this referral.  Luciano Hutchinson, OTR/L  Time Calculation: 55 mins

## 2020-07-07 NOTE — DISCHARGE SUMMARY
Ortho Discharge Summary    Patient ID:  Adenike Aceveod  [id number]  female  61 y.o.  1960    Admit date: 7/6/2020    Discharge date: 7/7/2020    Admitting Physician: Star Trinidad DO     Consulting Physician(s):   Treatment Team: Attending Provider: Jacquelin Cordon DO; Utilization Review: Raymundo Morse; Care Manager: Bharat Lazcano    Date of Surgery:   7/6/2020     Preoperative Diagnosis:  UNILATERAL PRIMARY OA RIGHT HIP    Postoperative Diagnosis:   UNILATERAL PRIMARY OA RIGHT HIP    Procedure(s):   right  RIGHT TOTAL HIP ARTHROPLASTY, DIRECT ANTERIOR     Anesthesia Type:   Spinal     Surgeon: Jacquelin Cordon DO                            HPI:  Pt is a 61 y.o. female who has a history of UNILATERAL PRIMARY OA RIGHT HIP  with pain and limitations of activities of daily living who presents at this time for a right CISCO following the failure of conservative management. PMH:   Past Medical History:   Diagnosis Date    Arthritis     Cardiomyopathy ([facility]     cardiologist-Dr. Carlton Colón     Heart failure ([facility]     acute systolic heart failure    Thyroid disease        Body mass index is 32.84 kg/m². : A BMI > 30 is classified as obesity and > 40 is classified as morbid obesity. Medications upon admission :   Prior to Admission Medications   Prescriptions Last Dose Informant Patient Reported? Taking? B.infantis-B.ani-B.long-B.bifi (Probiotic 4X) 10-15 mg TbEC 6/29/2020  Yes No   Sig: Take  by mouth. Entresto 24-26 mg tablet 7/6/2020 at 0430  Yes Yes   Sig: TAKE 1 TABLET BY MOUTH TWICE A DAY   Levothyroxine 150 mcg cap 7/6/2020 at 0430  Yes Yes   Sig: Take 150 mcg by mouth Daily (before breakfast). OTHER 6/29/2020  Yes No   Sig: Occuvite   Ruten-gums   calcium carbonate (CALCIUM 600 PO) 6/29/2020  Yes No   Sig: Take 1,000 mg by mouth daily. doxycycline (MONODOX) 100 mg capsule 7/6/2020 at 0430  Yes Yes   Sig: Take 200 mg by mouth two (2) times a day.    fexofenadine-pseudoephedrine (Allegra-D 12 Hour)  mg Tb12 6/29/2020  Yes No   Sig: Take 1 Tab by mouth every twelve (12) hours. furosemide (LASIX) 40 mg tablet 7/5/2020 at 0800  Yes No   Sig: TAKE 1 TABLET BY MOUTH EVERY DAY   meloxicam (MOBIC) 15 mg tablet Not Taking at Unknown time  No No   Sig: Take 1 Tab by mouth daily. metoprolol succinate (TOPROL-XL) 25 mg XL tablet 7/6/2020 at 0430  Yes Yes   Sig: TAKE 1 TABLET BY MOUTH EVERY DAY   omega 3-dha-epa-fish oil (Fish Oil) 100-160-1,000 mg cap 6/29/2020  Yes No   Sig: Take  by mouth daily. Facility-Administered Medications: None        Allergies:  No Known Allergies     Hospital Course: The patient underwent surgery. Complications:  None; patient tolerated the procedure well. Was taken to the PACU in stable condition and then transferred to the ortho floor. Perioperative Antibiotics:  Ancef     Postoperative Pain Management:  Oxycodone      DVT Prophylaxis: Aspirin 325    Postoperative transfusions:    Number of units banked PRBCs =   none     Post Op complications: none    Hemoglobin at discharge:    Lab Results   Component Value Date/Time    HGB 11.5 07/07/2020 03:46 AM    INR 1.0 06/29/2020 11:30 AM       Dressing was changed on POD # 1. Incision - clean, dry and intact. No significant erythema or swelling. Neurovascular exam found to be within normal limits. Wound appears to be healing without any evidence of infection. Physical Therapy started following surgery and participated in bed mobility, transfers and ambulation. Gait:  Gait  Gait Abnormalities: (flexed posture, scapular elevation)  Ambulation - Level of Assistance: Contact guard assistance  Distance (ft): 75 Feet (ft)  Assistive Device: Gait belt, Walker, rolling                   Discharged to: Home.     Condition on Discharge:   stable    Discharge instructions:  - Anticoagulate with Aspirin 325 BID  - Take pain medications as prescribed  - Resume pre hospital diet      - Discharge activity: activity as tolerated  - Ambulate with assistive device as needed. - Weight bearing status WBAT  - Wound Care Keep wound clean and dry. See discharge instruction sheet. -DISCHARGE MEDICATION LIST     Current Discharge Medication List      START taking these medications    Details   acetaminophen (TYLENOL) 500 mg tablet Take 2 Tabs by mouth every six (6) hours for 14 days. Qty: 112 Tab, Refills: 0      aspirin delayed-release 325 mg tablet Take 1 Tab by mouth two (2) times a day for 30 days. Qty: 60 Tab, Refills: 0      famotidine (PEPCID) 20 mg tablet Take 1 Tab by mouth two (2) times a day for 30 days. Qty: 60 Tab, Refills: 0      oxyCODONE IR (ROXICODONE) 5 mg immediate release tablet Take 1-2 Tabs by mouth every four (4) hours as needed for Pain for up to 14 days. Max Daily Amount: 60 mg.  Qty: 60 Tab, Refills: 0    Associated Diagnoses: S/P hip replacement, right      polyethylene glycol (MIRALAX) 17 gram packet Take 1 Packet by mouth daily as needed (constipation) for up to 15 days. Qty: 15 Packet, Refills: 0      senna-docusate (PERICOLACE) 8.6-50 mg per tablet Take 1 Tab by mouth daily. Qty: 30 Tab, Refills: 0         CONTINUE these medications which have NOT CHANGED    Details   doxycycline (MONODOX) 100 mg capsule Take 200 mg by mouth two (2) times a day. metoprolol succinate (TOPROL-XL) 25 mg XL tablet TAKE 1 TABLET BY MOUTH EVERY DAY      Entresto 24-26 mg tablet TAKE 1 TABLET BY MOUTH TWICE A DAY      Levothyroxine 150 mcg cap Take 150 mcg by mouth Daily (before breakfast). B.infantis-B.ani-B.long-B.bifi (Probiotic 4X) 10-15 mg TbEC Take  by mouth. omega 3-dha-epa-fish oil (Fish Oil) 100-160-1,000 mg cap Take  by mouth daily. calcium carbonate (CALCIUM 600 PO) Take 1,000 mg by mouth daily. fexofenadine-pseudoephedrine (Allegra-D 12 Hour)  mg Tb12 Take 1 Tab by mouth every twelve (12) hours.       OTHER Occuvite   Ruten-gums      furosemide (LASIX) 40 mg tablet TAKE 1 TABLET BY MOUTH EVERY DAY         STOP taking these medications       meloxicam (MOBIC) 15 mg tablet Comments:   Reason for Stopping:            per medical continuation form      -Follow up in office in 2 weeks      Signed:  Eulogio Kurtz.  Ady Jim, ACNP-BC, ONP-C  Orthopaedic Nurse Practitioner    7/7/2020  10:34 AM

## 2020-07-07 NOTE — PROGRESS NOTES
Problem: Mobility Impaired (Adult and Pediatric)  Goal: *Acute Goals and Plan of Care (Insert Text)  Description: FUNCTIONAL STATUS PRIOR TO ADMISSION: Patient was independent and active without use of DME.    HOME SUPPORT PRIOR TO ADMISSION: The patient lived alone with family members close by to provide assistance. She plans to have family stay with her immediately post discharge. Physical Therapy Goals  Initiated 7/6/2020  1. Patient will move from supine to sit and sit to supine  in bed with modified independence within 7 day(s). 2.  Patient will transfer from bed to chair and chair to bed with modified independence using the least restrictive device within 7 day(s). 3.  Patient will perform sit to stand with modified independence within 7 day(s). 4.  Patient will ambulate with modified independence for 200 feet with the least restrictive device within 7 day(s). 5.  Patient will ascend/descend 4 stairs with 1 handrail(s) with modified independence within 7 day(s). Note:   PHYSICAL THERAPY TREATMENT  Patient: Ermias Iraheta (25 y.o. female)  Date: 7/7/2020  Diagnosis: Unilateral primary osteoarthritis, right hip [M16.11]     Procedure(s) (LRB): RIGHT TOTAL HIP ARTHROPLASTY, DIRECT ANTERIOR (Right) 1 Day Post-Op    Precautions:  falls  Chart, physical therapy assessment, plan of care and goals were reviewed. ASSESSMENT: Patient continues with skilled PT services and is progressing towards goals, no LOB or SOB, did well with stair trng and car transfer, good motivation, does well with transfers and ther-ex, vc's for safety and proper RW use, from PT standpoint pt is ready for d/c. Current Level of Function Impacting Discharge (mobility/balance): supervision       PLAN :  Patient continues to benefit from skilled intervention to address the above impairments. Continue treatment per established plan of care. to address goals.     Recommendation for discharge: (in order for the patient to meet his/her long term goals) Physical therapy at least 2 days/week in the home     This discharge recommendation: Has been made in collaboration with the attending provider and/or case management    IF patient discharges home will need the following DME: patient owns DME required for discharge     OBJECTIVE DATA SUMMARY:     Critical Behavior:  Neurologic State: Alert  Orientation Level: Oriented X4  Cognition: Appropriate safety awareness, Follows commands     Functional Mobility Training:  Bed Mobility: Pt sitting in chair on arrival.    Transfers:  Sit to Stand: Supervision  Stand to Sit: Supervision  Interventions: Verbal cues  Level of Assistance: Supervision    Balance:  Sitting: Intact; Without support  Standing: Intact; With support    Ambulation/Gait Training:  Distance (ft): 150 Feet (ft)  Assistive Device: Gait belt;Walker, rolling  Ambulation - Level of Assistance: Supervision  Gait Abnormalities: Antalgic;Decreased step clearance  Right Side Weight Bearing: As tolerated  Left Side Weight Bearing: Full  Base of Support: Widened  Stance: Right decreased  Speed/Carmen: Pace decreased (<100 feet/min)  Step Length: Left shortened;Right shortened  Interventions: Verbal cues    Stairs:  Number of Stairs Trained: 4  Stairs - Level of Assistance: Supervision   Rail Use: Both    Therapeutic Exercises:   sitting  EXERCISE   Sets   Reps   Active Active Assist   Passive   Comments   Ankle pumps 1 10 [x] [] [] bilat   Heel raises 1 10 [x] [] [] \"   Toe tap 1 10 [x] [] [] \"   Knee ext 1 10 [x] [] [] \"   Hip flex 1 10 [x] [] [] \"     Pain Ratin    Activity Tolerance: Good    After treatment patient left in no apparent distress: Sitting in chair and Call bell within reach    COMMUNICATION/COLLABORATION:   The patients plan of care was discussed with: Registered nurse.      Alexis Rodriguez PTA   Time Calculation: 25 mins

## 2020-07-07 NOTE — PROGRESS NOTES
Problem: Falls - Risk of  Goal: *Absence of Falls  Description: Document Robert Adam Fall Risk and appropriate interventions in the flowsheet. Outcome: Progressing Towards Goal  Note: Fall Risk Interventions:  Mobility Interventions: OT consult for ADLs, Patient to call before getting OOB, PT Consult for mobility concerns, PT Consult for assist device competence    Mentation Interventions: Door open when patient unattended    Medication Interventions: Patient to call before getting OOB    Elimination Interventions: Call light in reach, Patient to call for help with toileting needs    History of Falls Interventions: Door open when patient unattended         Problem: Pressure Injury - Risk of  Goal: *Prevention of pressure injury  Description: Document Mango Scale and appropriate interventions in the flowsheet. Outcome: Progressing Towards Goal  Note: Pressure Injury Interventions:             Activity Interventions: Increase time out of bed, Pressure redistribution bed/mattress(bed type), PT/OT evaluation    Mobility Interventions: Float heels, HOB 30 degrees or less, Pressure redistribution bed/mattress (bed type), PT/OT evaluation    Nutrition Interventions: Document food/fluid/supplement intake

## 2020-07-07 NOTE — PROGRESS NOTES
Ortho / Neurosurgery NP Note    POD# 1  s/p RIGHT TOTAL HIP ARTHROPLASTY, DIRECT ANTERIOR     Walking in [place] with OT and progressing well with   Independently moving. Pain well controlled. Tolerating regular diet. VSS Afebrile. Room air. BP soft yesterday but improved today  Denies dizziness, sob, CP    Visit Vitals  /57 (BP 1 Location: Left arm, BP Patient Position: At rest)   Pulse 75   Temp 98.6 °F (37 °C)   Resp 14   Ht 5' 7\" (1.702 m)   Wt 95.1 kg (209 lb 10.5 oz)   SpO2 94%   BMI 32.84 kg/m²       Voiding status: + void  Output (mL)  Urine Voided: 600 ml (07/07/20 0342)  Last Bowel Movement Date: 07/06/20 (07/07/20 0007)  Unmeasurable Output  Urine Occurrence(s): 1 (07/06/20 2144)      Labs    Lab Results   Component Value Date/Time    HGB 11.5 07/07/2020 03:46 AM      Lab Results   Component Value Date/Time    INR 1.0 06/29/2020 11:30 AM      Lab Results   Component Value Date/Time    Sodium 139 07/07/2020 03:46 AM    Potassium 4.1 07/07/2020 03:46 AM    Chloride 109 (H) 07/07/2020 03:46 AM    CO2 25 07/07/2020 03:46 AM    Glucose 117 (H) 07/07/2020 03:46 AM    BUN 24 (H) 07/07/2020 03:46 AM    Creatinine 0.52 (L) 07/07/2020 03:46 AM    Calcium 8.5 07/07/2020 03:46 AM     Recent Glucose Results:   Lab Results   Component Value Date/Time     (H) 07/07/2020 03:46 AM    GLUCPOC 145 (H) 07/07/2020 07:39 AM    GLUCPOC 167 (H) 07/06/2020 09:19 PM    GLUCPOC 164 (H) 07/06/2020 03:54 PM       Body mass index is 32.84 kg/m². : A BMI > 30 is classified as obesity and > 40 is classified as morbid obesity. Steristrips and tegaderm dressing c.d.i  Cryotherapy in place over incision  Calves soft and supple; No pain with passive stretch  Sensation and motor intact - PF/DF/EHL intact 5/5  SCDs for mechanical DVT proph while in bed     PLAN:  1) PT BID, OT - WBAT   2) Aspirin 325 mg PO BID for DVT Prophylaxis   3) GI Prophylaxis - Pepcid  4) CHF - recently diagnosed.  Continue home meds on admission as BP allows. 5) Readniess for discharge:     [x] Vital Signs stable    [x] Hgb stable    [x] + Voiding    [x] Wound intact, drainage minimal    [x] Tolerating PO intake     [x] Cleared by OT  - needs to work with PT still     [] [address] [place] [address] training completed (if applicable)    [x] Independent / Contact Guard Assist (household distance)     [x] Bed mobility     [x] Car transfers     [x] ADLs    [x] Adequate pain control on oral medication alone     Discharge pending PT clearance. Plans to return home with support of [place].      Aldo Reinoso, NP

## 2020-07-08 ENCOUNTER — HOME CARE VISIT (OUTPATIENT)
Dept: SCHEDULING | Facility: HOME HEALTH | Age: 60
End: 2020-07-08
Payer: COMMERCIAL

## 2020-07-08 ENCOUNTER — HOME CARE VISIT (OUTPATIENT)
Dept: HOME HEALTH SERVICES | Facility: HOME HEALTH | Age: 60
End: 2020-07-08

## 2020-07-08 VITALS
TEMPERATURE: 98.2 F | DIASTOLIC BLOOD PRESSURE: 70 MMHG | SYSTOLIC BLOOD PRESSURE: 118 MMHG | HEART RATE: 72 BPM | OXYGEN SATURATION: 99 % | RESPIRATION RATE: 17 BRPM

## 2020-07-08 PROCEDURE — 400013 HH SOC

## 2020-07-08 PROCEDURE — G0151 HHCP-SERV OF PT,EA 15 MIN: HCPCS

## 2020-07-09 ENCOUNTER — DOCUMENTATION ONLY (OUTPATIENT)
Dept: ORTHOPEDIC SURGERY | Age: 60
End: 2020-07-09

## 2020-07-09 NOTE — PROGRESS NOTES
Rcvd call from [facility]- Pt's disability insurance. I confirmed pt's  and claim number and confirmed that pt di have surgery on  and was discharged 2020.

## 2020-07-10 ENCOUNTER — HOME CARE VISIT (OUTPATIENT)
Dept: HOME HEALTH SERVICES | Facility: HOME HEALTH | Age: 60
End: 2020-07-10
Payer: COMMERCIAL

## 2020-07-10 ENCOUNTER — HOME CARE VISIT (OUTPATIENT)
Dept: SCHEDULING | Facility: HOME HEALTH | Age: 60
End: 2020-07-10
Payer: COMMERCIAL

## 2020-07-10 VITALS
OXYGEN SATURATION: 98 % | DIASTOLIC BLOOD PRESSURE: 61 MMHG | RESPIRATION RATE: 17 BRPM | HEART RATE: 82 BPM | TEMPERATURE: 98.4 F | SYSTOLIC BLOOD PRESSURE: 100 MMHG

## 2020-07-10 PROCEDURE — G0151 HHCP-SERV OF PT,EA 15 MIN: HCPCS

## 2020-07-13 ENCOUNTER — HOME CARE VISIT (OUTPATIENT)
Dept: SCHEDULING | Facility: HOME HEALTH | Age: 60
End: 2020-07-13
Payer: COMMERCIAL

## 2020-07-13 ENCOUNTER — HOME CARE VISIT (OUTPATIENT)
Dept: HOME HEALTH SERVICES | Facility: HOME HEALTH | Age: 60
End: 2020-07-13
Payer: COMMERCIAL

## 2020-07-13 VITALS
TEMPERATURE: 98.4 F | RESPIRATION RATE: 17 BRPM | SYSTOLIC BLOOD PRESSURE: 100 MMHG | DIASTOLIC BLOOD PRESSURE: 59 MMHG | OXYGEN SATURATION: 97 % | HEART RATE: 81 BPM

## 2020-07-13 PROCEDURE — G0151 HHCP-SERV OF PT,EA 15 MIN: HCPCS

## 2020-07-15 ENCOUNTER — HOME CARE VISIT (OUTPATIENT)
Dept: HOME HEALTH SERVICES | Facility: HOME HEALTH | Age: 60
End: 2020-07-15
Payer: COMMERCIAL

## 2020-07-15 ENCOUNTER — HOME CARE VISIT (OUTPATIENT)
Dept: SCHEDULING | Facility: HOME HEALTH | Age: 60
End: 2020-07-15
Payer: COMMERCIAL

## 2020-07-15 VITALS
RESPIRATION RATE: 17 BRPM | TEMPERATURE: 98.2 F | DIASTOLIC BLOOD PRESSURE: 64 MMHG | HEART RATE: 72 BPM | OXYGEN SATURATION: 99 % | SYSTOLIC BLOOD PRESSURE: 102 MMHG

## 2020-07-15 PROCEDURE — G0151 HHCP-SERV OF PT,EA 15 MIN: HCPCS

## 2020-07-17 DIAGNOSIS — Z96.641 AFTERCARE FOLLOWING RIGHT HIP JOINT REPLACEMENT SURGERY: Primary | ICD-10-CM

## 2020-07-17 DIAGNOSIS — Z47.1 AFTERCARE FOLLOWING RIGHT HIP JOINT REPLACEMENT SURGERY: Primary | ICD-10-CM

## 2020-07-20 ENCOUNTER — OFFICE VISIT (OUTPATIENT)
Dept: ORTHOPEDIC SURGERY | Age: 60
End: 2020-07-20

## 2020-07-20 ENCOUNTER — HOSPITAL ENCOUNTER (OUTPATIENT)
Dept: GENERAL RADIOLOGY | Age: 60
Discharge: HOME | End: 2020-07-20
Payer: COMMERCIAL

## 2020-07-20 VITALS
DIASTOLIC BLOOD PRESSURE: 56 MMHG | HEART RATE: 79 BPM | WEIGHT: 209 LBS | SYSTOLIC BLOOD PRESSURE: 85 MMHG | OXYGEN SATURATION: 98 % | TEMPERATURE: 96.7 F | HEIGHT: 67 IN | BODY MASS INDEX: 32.8 KG/M2

## 2020-07-20 DIAGNOSIS — Z96.641 S/P HIP REPLACEMENT, RIGHT: ICD-10-CM

## 2020-07-20 DIAGNOSIS — Z96.641 AFTERCARE FOLLOWING RIGHT HIP JOINT REPLACEMENT SURGERY: ICD-10-CM

## 2020-07-20 DIAGNOSIS — Z47.1 AFTERCARE FOLLOWING RIGHT HIP JOINT REPLACEMENT SURGERY: ICD-10-CM

## 2020-07-20 PROCEDURE — 73502 X-RAY EXAM HIP UNI 2-3 VIEWS: CPT

## 2020-07-20 RX ORDER — OXYCODONE HYDROCHLORIDE 5 MG/1
5-10 TABLET ORAL
Qty: 60 TAB | Refills: 0 | Status: SHIPPED | OUTPATIENT
Start: 2020-07-20 | End: 2020-09-14 | Stop reason: SDUPTHER

## 2020-07-20 NOTE — PROGRESS NOTES
Identified pt with two pt identifiers (name and ). Reviewed chart in preparation for visit and have obtained necessary documentation. Johana Norman is a 61 y.o. female  Chief Complaint   Patient presents with    Surgical Follow-up     RT hip     Visit Vitals  BP (!) 85/56 (BP 1 Location: Right arm, BP Patient Position: Sitting)   Pulse 79   Temp (!) 96.7 °F (35.9 °C) (Tympanic)   Ht 5' 7\" (1.702 m)   Wt 209 lb (94.8 kg)   SpO2 98%   BMI 32.73 kg/m²     1. Have you been to the ER, urgent care clinic since your last visit? Hospitalized since your last visit? No    2. Have you seen or consulted any other health care providers outside of the [address] [place] [address] since your last visit? Include any pap smears or colon screening.  No

## 2020-07-20 NOTE — PROGRESS NOTES
is here for a follow up visit from a total hip arthroplasty on the right side. The patient is doing well, with no medical complications since discharge. Pain has been appropriate since surgery,and the patient is progressing well with physical therapy. Current Outpatient Medications on File Prior to Visit   Medication Sig Dispense Refill    acetaminophen (TYLENOL) 500 mg tablet Take 2 Tabs by mouth every six (6) hours for 14 days. 112 Tab 0    aspirin delayed-release 325 mg tablet Take 1 Tab by mouth two (2) times a day for 30 days. 60 Tab 0    famotidine (PEPCID) 20 mg tablet Take 1 Tab by mouth two (2) times a day for 30 days. 60 Tab 0    polyethylene glycol (MIRALAX) 17 gram packet Take 1 Packet by mouth daily as needed (constipation) for up to 15 days. 15 Packet 0    senna-docusate (PERICOLACE) 8.6-50 mg per tablet Take 1 Tab by mouth daily. 30 Tab 0    doxycycline (MONODOX) 100 mg capsule Take 200 mg by mouth two (2) times a day.  B.infantis-B.ani-B.long-B.bifi (Probiotic 4X) 10-15 mg TbEC Take 10-15 mg by mouth daily.  omega 3-dha-epa-fish oil (Fish Oil) 100-160-1,000 mg cap Take 1,000 mg by mouth daily. take 1 tablet daily      calcium carbonate (CALCIUM 600 PO) Take 1,000 mg by mouth daily.  fexofenadine-pseudoephedrine (Allegra-D 12 Hour)  mg Tb12 Take 1 Tab by mouth every twelve (12) hours.  OTHER Occuvite   Ruten-gums      furosemide (LASIX) 40 mg tablet TAKE 1 TABLET BY MOUTH EVERY DAY      metoprolol succinate (TOPROL-XL) 25 mg XL tablet TAKE 1 TABLET BY MOUTH EVERY DAY      Entresto 24-26 mg tablet TAKE 1 TABLET BY MOUTH TWICE A DAY      Levothyroxine 150 mcg cap Take 150 mcg by mouth Daily (before breakfast). No current facility-administered medications on file prior to visit. ROS:  General: denies agitation, major chest pain, unexpected weakness  Hip pain is managed with oxycodone. Skin: healing wound is without issue.    Strength: appropriate weakness of involved extremity is resolving since surgery      Physical Examination:    Blood pressure (!) 85/56, pulse 79, temperature (!) 96.7 °F (35.9 °C), temperature source Tympanic, height 5' 7\" (1.702 m), weight 209 lb (94.8 kg), SpO2 98 %. Skin: no significant drainage, no wound dehiscence. Sensation intact to light touch at level of wound and distally. Lateral femoral cutaneous nerve function is intact. Strength is 4/5 with hip flexion and abduction  Leg lengths are clinically equal  Distal swelling is noted, but appropriate for postoperative course  Distal capillary refill less than 2 seconds      Imaging:    Postoperative xrays are reviewed, they indicate a right total hip arthroplasty in excellent position without evidence of loosening or failure. Leg lengths are equal through the hip.       Assessment: Status post right total hip arthroplasty    Plan:  Patient will continue physical therapy, with the goal of outpatient therapy and then home exercise program as soon as is possible  Wound care and dental prophylaxis instructions were reviewed  Continue to weight bear as tolerated without restriction, except to keep to the positions of comfort  Deep Venous Thrombosis Prophylaxis: ecotrin    Follow up will be at 4 weeks from now,  Right hip xrays on follow up

## 2020-07-21 ENCOUNTER — HOME CARE VISIT (OUTPATIENT)
Dept: SCHEDULING | Facility: HOME HEALTH | Age: 60
End: 2020-07-21
Payer: COMMERCIAL

## 2020-07-21 ENCOUNTER — HOME CARE VISIT (OUTPATIENT)
Dept: HOME HEALTH SERVICES | Facility: HOME HEALTH | Age: 60
End: 2020-07-21
Payer: COMMERCIAL

## 2020-07-21 VITALS
DIASTOLIC BLOOD PRESSURE: 60 MMHG | SYSTOLIC BLOOD PRESSURE: 105 MMHG | HEART RATE: 70 BPM | OXYGEN SATURATION: 98 % | TEMPERATURE: 98.2 F | RESPIRATION RATE: 17 BRPM

## 2020-07-21 PROCEDURE — G0151 HHCP-SERV OF PT,EA 15 MIN: HCPCS

## 2020-07-30 ENCOUNTER — HOSPITAL ENCOUNTER (OUTPATIENT)
Dept: PHYSICAL THERAPY | Age: 60
Discharge: HOME | End: 2020-07-30
Payer: COMMERCIAL

## 2020-07-30 PROCEDURE — 97110 THERAPEUTIC EXERCISES: CPT | Performed by: PHYSICAL THERAPIST

## 2020-07-30 PROCEDURE — 97162 PT EVAL MOD COMPLEX 30 MIN: CPT | Performed by: PHYSICAL THERAPIST

## 2020-07-30 NOTE — PROGRESS NOTES
[address] [address] (MOB IV), [address] [facility]  [place],  [address]  Phone: [phone] Fax: [phone]    Plan of Care/Statement of Necessity for Physical Therapy Services  2-15    Patient name: Jes Somers  : 1960  Provider#: [id number]  Referral source: Dipika Arias DO      Medical/Treatment Diagnosis: Presence of right artificial hip joint [Z96.641]     Prior Hospitalization: see medical history     Comorbidities: arthritis, back pain, BMI over 30, CHF, prior surgery, right THR  Prior Level of Function: 20 in of exercise at least 3x/wk  Medications: Verified on Patient Summary List    Start of Care: 20      Onset Date: s/p right THR 20       The Plan of Care and following information is based on the information from the initial evaluation. Assessment/ key information: The patient presents with right hip pain, decreased strength, and decreased ROM that is consistent with being s/p right THR 20. 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 The patient will benefit from guided therapeutic interventions such as therex for strengthening and neuromuscular re-eduction, manual techniques for joint mobility and soft tissue extensibility, and modalities for pain management in order to improve functional mobility with daily activities. Evaluation Complexity History MEDIUM  Complexity : 1-2 comorbidities / personal factors will impact the outcome/ POC ; Examination MEDIUM Complexity : 3 Standardized tests and measures addressing body structure, function, activity limitation and / or participation in recreation  ;Presentation MEDIUM Complexity : Evolving with changing characteristics  ; Clinical Decision Making MEDIUM Complexity : FOTO score of 26-74  Overall Complexity Rating: MEDIUM    Problem List: pain affecting function, decrease ROM, decrease strength, edema affecting function, impaired gait/ balance, decrease ADL/ functional abilitiies, decrease activity tolerance, decrease flexibility/ joint mobility and decrease transfer abilities   Treatment Plan may include any combination of the following: Therapeutic exercise, Therapeutic activities, Neuromuscular re-education, Physical agent/modality, Gait/balance training, Manual therapy, Patient education, Self Care training, Functional mobility training, Home safety training and Stair training  Patient / Family readiness to learn indicated by: asking questions, trying to perform skills and interest  Persons(s) to be included in education: patient (P)  Barriers to Learning/Limitations: None  Patient Goal (s): to get back to work  Patient Self Reported Health Status: good  Rehabilitation Potential: good    Short Term Goals: To be accomplished in 2 treatments:                         1.) The patient will be independent with their HEP consistently for at least one week. Long Term Goals: To be accomplished in 16 treatments:                         1.) The patient will have at most 2/10 pain with daily activities. 2.) The patient will be able to ambulate for at least 30min without an AD or having to change positions due to pain. 3.) The patient will improve their FOTO score from 56 to at least 65 to show improvements in functional mobility. 4.) The patient will be able to comfortably get down to and up from the ground with minimal support in order to comfortably return to work. Frequency / Duration: Patient to be seen 2 times per week for 16 treatments.       Patient/ Caregiver education and instruction: self care, activity modification and exercises    [x]  Plan of care has been reviewed with LETICIA Heredia, PT 7/30/2020 ________________________________________________________________________    I certify that the above Therapy Services are being furnished while the patient is under my care. I agree with the treatment plan and certify that this therapy is necessary.     [de-identified] Signature:____________________  Date:____________Time: _________

## 2020-07-30 NOTE — PROGRESS NOTES
PT INITIAL EVALUATION NOTE 2-15    Patient Name: Willis Maravilla  Date:2020  : 1960  [x]  Patient  Verified  Payor: Ariana Duran / Plan: Jacque Sosa HMO / Product Type: HMO /    In time: 2:40pm  Out time: 3:32pm  Total Treatment Time (min): 46  Visit #: 1     Treatment Area: Presence of right artificial hip joint [Z96.641]    SUBJECTIVE  Pain Level (0-10 scale): 2 (2-10/10)  Any medication changes, allergies to medications, adverse drug reactions, diagnosis change, or new procedure performed?: [] No    [x] Yes (see summary sheet for update)  Subjective: The patient reports that she had a right hip replacement on 20. She had some home therapy and just switched to the quad cane from the walker. There are 4 steps to get in her house, bilateral individual handrails, 12 steps, right handrail going down. It will occasionally wake her up at night. She's only been on her for about 20 minutes at night. 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 OBJECTIVE    Posture:  Scapular protraction bilaterally  Other Observations:  None noted  Gait and Functional Mobility:  Antalgic; small quad cane in LUE; decreased clearance bilaterally  Palpation: lateral hip       LOWER QUARTER   MUSCLE STRENGTH  KEY       R  L  0 - No Contraction  L1, L2 Psoas  3-, p!  5  1 - Trace   L3 Quads  4  4  2 - Poor   L4 Tib Ant  4  4  3 - Fair    L5 EHL  4  4  4 - Good   S1 Peroneals  4  4  5 - Normal   S2 Hams  4  4    Flexibility: tight hip flexor  Mobility Assessment: hypomobile right hip flexion (100 PROM ) and hip IR (10 PROM)      MMT: nt                Neurological: Reflexes / Sensations: nt  Special Tests:     30s STS: 9x  5x STS: 15s  SLS: R= 2s; L= 4s  TUs with quad cane    25 min Therapeutic Exercise:  [x] See flow sheet :   Rationale: increase ROM, increase strength and improve coordination to improve the patients ability to perform daily activities.            With   [x] TE   [] TA   [] neuro   [] other: Patient Education: [x] Review HEP    [x] Progressed/Changed HEP based on:   [x] positioning   [x] body mechanics   [] transfers   [] heat/ice application    [] other:        Other Objective/Functional Measures: FOTO= 56    Pain Level (0-10 scale) post treatment: 2      ASSESSMENT:      [x]  See Plan of [facility], PT 7/30/2020

## 2020-08-05 ENCOUNTER — HOSPITAL ENCOUNTER (OUTPATIENT)
Dept: PHYSICAL THERAPY | Age: 60
Discharge: HOME | End: 2020-08-05
Payer: COMMERCIAL

## 2020-08-05 PROCEDURE — 97110 THERAPEUTIC EXERCISES: CPT

## 2020-08-05 NOTE — PROGRESS NOTES
PT DAILY TREATMENT NOTE - [facility] 2-15    Patient Ewa Record  Date:2020  : 1960  [x]  Patient  Verified  Payor: Gail Ruelas / Plan: Antoinette Castañeda HMO / Product Type: HMO /    In time:900  Out time:1000  Total Treatment Time (min): 60  Total Timed Codes (min): 45  1:1 Treatment Time ([facility] [place] [place] only):    Visit #: 2   Visit count could not be calculated. Make sure you are using a visit which is associated with an episode. Treatment Area: Presence of right artificial hip joint [Z96.641]    SUBJECTIVE  Pain Level (0-10 scale): 3  Any medication changes, allergies to medications, adverse drug reactions, diagnosis change, or new procedure performed?: [x] No    [] Yes (see summary sheet for update)  Subjective functional status/changes:   [] No changes reported  Tolerate exercises well. Some difficulty with bridges on her soft bed. Cc is of anterior hip soreness in the area of her incision primarily at night. Not so much activity driven.      OBJECTIVE    Modality rationale: decrease edema and decrease pain to improve the patients ability to function   Type Additional Details   [] Estim: []Att   []Unatt    []TENS instruct                  []IFC  []Premod   []NMES                     []Other:  []w/US   []w/ice   []w/heat  Position:  Location:   []  Traction: [] Cervical       []Lumbar                       [] Prone          []Supine                       []Intermittent   []Continuous Lbs:  [] before manual  [] after manual  []w/heat   []  Ultrasound: []Continuous   [] Pulsed                       at: []1MHz   []3MHz Location:  W/cm2:   [] Paraffin         Location:   []w/heat   [x]  Ice     []  Heat      10 minutes  []  Ice massage Position: right anterior hip post exercise, hook lying position   Location:   []  Laser  []  Other: Position:  Location:   []  Vasopneumatic Device     [x] Skin assessment post-treatment:  [x]intact []redness- no adverse reaction    []redness - adverse reaction:     45 min Therapeutic Exercise:  [x] See flow sheet :   Rationale: increase ROM and increase strength to improve the patients ability to improve function      5 min Manual Therapy: stretches to her hamstrings and quad in supine. Rationale: increase tissue extensibility to improve the patients ability to improve function            With   [x] TE   [] TA   [] neuro   [] other: Patient Education: [x] Review HEP    [] Progressed/Changed HEP based on:   [] positioning   [] body mechanics   [] transfers   [] heat/ice application    [] other:   Instructed her in cross friction massage techniques and advised her to perform this for several minutes 2-3X/day. Other Objective/Functional Measures:      Pain Level (0-10 scale) post treatment: 2    ASSESSMENT/Changes in Function:   Tends to be flexed at the hips with gait, better with verbal cues. Good strength and balance for 4 weeks post op  Patient will continue to benefit from skilled PT services to modify and progress therapeutic interventions to attain remaining goals. [x]  See Plan of Care  []  See progress note/recertification  []  See Discharge Summary         Progress towards goals / Updated goals:  Short Term Goals: To be accomplished in 2 treatments:                         2.) The patient will be independent with their HEP consistently for at least one week. Long Term Goals: To be accomplished in 16 treatments:                         1.) The patient will have at most 2/10 pain with daily activities.                        2.) The patient will be able to ambulate for at least 30min without an AD or having to change positions due to pain.                         3.) The patient will improve their FOTO score from 56 to at least 65 to show improvements in functional mobility. 4.) The patient will be able to comfortably get down to and up from the ground with minimal support in order to comfortably return to work.   Frequency / Duration: Patient to be seen 2 times per week for 16 treatments  PLAN  [x]  Upgrade activities as tolerated     [x]  Continue plan of care  []  Update interventions per flow sheet       []  Discharge due to:_  []  Other:_      Dianne Saldaña, PT 8/5/2020

## 2020-08-07 ENCOUNTER — HOSPITAL ENCOUNTER (OUTPATIENT)
Dept: PHYSICAL THERAPY | Age: 60
Discharge: HOME | End: 2020-08-07
Payer: COMMERCIAL

## 2020-08-07 PROCEDURE — 97110 THERAPEUTIC EXERCISES: CPT

## 2020-08-07 PROCEDURE — 97140 MANUAL THERAPY 1/> REGIONS: CPT

## 2020-08-07 NOTE — PROGRESS NOTES
PT DAILY TREATMENT NOTE - [facility] 2-15    Patient [employer]  Date:2020  : 1960  [x]  Patient  Verified  Payor: Prosper Oakfield / Plan: Chelsey Saleh HMO / Product Type: HMO /    In time: 8294  Out time:1115  Total Treatment Time (min): 60  Total Timed Codes (min): 45  1:1 Treatment Time ([facility] only):    Visit #: 3   Visit count could not be calculated. Make sure you are using a visit which is associated with an episode. Treatment Area: Presence of right artificial hip joint [Z96.641]    SUBJECTIVE  Pain Level (0-10 scale): 3  Any medication changes, allergies to medications, adverse drug reactions, diagnosis change, or new procedure performed?: [x] No    [] Yes (see summary sheet for update)  Subjective functional status/changes:   [] No changes reported  Sore for a day after last visit. She did do an excessive amount of walking that day after therapy. A little better today.        OBJECTIVE    Modality rationale: decrease edema and decrease pain to improve the patients ability to function   Type Additional Details   [] Estim: []Att   []Unatt    []TENS instruct                  []IFC  []Premod   []NMES                     []Other:  []w/US   []w/ice   []w/heat  Position:  Location:   []  Traction: [] Cervical       []Lumbar                       [] Prone          []Supine                       []Intermittent   []Continuous Lbs:  [] before manual  [] after manual  []w/heat   []  Ultrasound: []Continuous   [] Pulsed                       at: []1MHz   []3MHz Location:  W/cm2:   [] Paraffin         Location:   []w/heat   [x]  Ice     []  Heat      15 minutes  []  Ice massage Position: right anterior hip post exercise, hook lying position   Location:   []  Laser  []  Other: Position:  Location:   []  Vasopneumatic Device     [x] Skin assessment post-treatment:  [x]intact []redness- no adverse reaction    []redness - adverse reaction:     30 min Therapeutic Exercise:  [x] See flow sheet :   Rationale: increase ROM and increase strength to improve the patients ability to improve function      15 min Manual Therapy: stretches to her hamstrings and quad in supine. Cross friction massage   Rationale: increase tissue extensibility to improve the patients ability to improve function            With   [x] TE   [] TA   [] neuro   [] other: Patient Education: [x] Review HEP    [] Progressed/Changed HEP based on:   [] positioning   [] body mechanics   [] transfers   [] heat/ice application    [] other:   Instructed her in cross friction massage techniques and advised her to perform this for several minutes 2-3X/day. Other Objective/Functional Measures:      Pain Level (0-10 scale) post treatment: 2    ASSESSMENT/Changes in Function:   Tends to be flexed at the hips with gait, better with verbal cues. Good strength and balance for 4 weeks post op. Compliant with her HEP. Gait mechanics still poor without the use of her cane. Recommended continue use of it until her strength improves  Patient will continue to benefit from skilled PT services to modify and progress therapeutic interventions to attain remaining goals. [x]  See Plan of Care  []  See progress note/recertification  []  See Discharge Summary         Progress towards goals / Updated goals:  Short Term Goals: To be accomplished in 2 treatments:                         4.) The patient will be independent with their HEP consistently for at least one week. Long Term Goals: To be accomplished in 16 treatments:                         3.) The patient will have at most 2/10 pain with daily activities.                        1.) The patient will be able to ambulate for at least 30min without an AD or having to change positions due to pain.                         3.) The patient will improve their FOTO score from 56 to at least 65 to show improvements in functional mobility.                          4.) The patient will be able to comfortably get down to and up from the ground with minimal support in order to comfortably return to work.   Frequency / Mary Matter to be seen 2 times per week for 16 treatments  PLAN  [x]  Upgrade activities as tolerated     [x]  Continue plan of care  []  Update interventions per flow sheet       []  Discharge due to:_  []  Other:_      Jennifer Garcia, PT 8/7/2020

## 2020-08-10 ENCOUNTER — HOSPITAL ENCOUNTER (OUTPATIENT)
Dept: PHYSICAL THERAPY | Age: 60
Discharge: HOME | End: 2020-08-10
Payer: COMMERCIAL

## 2020-08-10 PROCEDURE — 97110 THERAPEUTIC EXERCISES: CPT | Performed by: PHYSICAL THERAPIST

## 2020-08-10 NOTE — PROGRESS NOTES
PT DAILY TREATMENT NOTE - [facility] 2-15    Patient Name:Miryam Garcia  Date:8/10/2020  : 1960  [x]  Patient  Verified  Payor: Ariana Duran / Plan: Jacque Sosa HMO / Product Type: HMO /    In time: 130 P    Out time:235 P   Total Treatment Time (min): 65  Total Timed Codes (min): 55  Visit #:  2    Treatment Area: Presence of right artificial hip joint [Z96.641]    SUBJECTIVE  Pain Level (0-10 scale):3  Any medication changes, allergies to medications, adverse drug reactions, diagnosis change, or new procedure performed?: [x] No    [] Yes (see summary sheet for update)  Subjective functional status/changes:   [] No changes reported  Feels like it's getting better. OBJECTIVE    Modality rationale: decrease edema and decrease pain to improve the patients ability to function   Type Additional Details   [x]  Ice     []  Heat      10 minutes   Position: supine, penelope LE's supported  Location (R) ant hip     [x] Skin assessment post-treatment:  [x]intact []redness- no adverse reaction    []redness - adverse reaction:     55 min Therapeutic Exercise:  [x] See flow sheet :   Rationale: increase ROM and increase strength to improve the patients ability to improve function            With   [x] TE   [] TA   [] neuro   [] other: Patient Education: [x] Review HEP    [] Progressed/Changed HEP based on:   [] positioning   [] body mechanics   [] transfers   [] heat/ice application    [] other:       Other Objective/Functional Measures:      Pain Level (0-10 scale) post treatment: 0    ASSESSMENT/Changes in Function:     Pt requires frequent cues to maintain upright posture doing standing hip and balance exercises. Has been compliant with exercises and icing. Patient will continue to benefit from skilled PT services to modify and progress therapeutic interventions to attain remaining goals.      [x]  See Plan of Care  []  See progress note/recertification  []  See Discharge Summary         Progress towards goals / Updated goals:  Short Term Goals: To be accomplished in 2 treatments:                         7.) The patient will be independent with their HEP consistently for at least one week. Long Term Goals: To be accomplished in 16 treatments:                         0.) The patient will have at most 2/10 pain with daily activities.                        4.) The patient will be able to ambulate for at least 30min without an AD or having to change positions due to pain.                         3.) The patient will improve their FOTO score from 56 to at least 65 to show improvements in functional mobility. 4.) The patient will be able to comfortably get down to and up from the ground with minimal support in order to comfortably return to work. Frequency / Duration: Patient to be seen 2 times per week for 16 treatments  PLAN  [x]  Upgrade activities as tolerated     [x]  Continue plan of care  []  Update interventions per flow sheet       []  Discharge due to:_  []  Other:_      Jennifer Xiong.  Prosper, PT 8/10/2020

## 2020-08-12 ENCOUNTER — HOSPITAL ENCOUNTER (OUTPATIENT)
Dept: PHYSICAL THERAPY | Age: 60
Discharge: HOME | End: 2020-08-12
Payer: COMMERCIAL

## 2020-08-12 PROCEDURE — 97110 THERAPEUTIC EXERCISES: CPT | Performed by: PHYSICAL THERAPIST

## 2020-08-12 NOTE — PROGRESS NOTES
PT DAILY TREATMENT NOTE - [facility] 2-15    Patient Name:Miryam Garcia  Date:2020  : 1960  [x]  Patient  Verified  Payor: Rose Mary Kennedy / Plan: Sara Fernandez HMO / Product Type: HMO /    In time: 1245 P     Out time: 150 P   Total Treatment Time (min): 65  Total Timed Codes (min): 55  Visit #:  3    Treatment Area: Presence of right artificial hip joint [Z96.641]    SUBJECTIVE  Pain Level (0-10 scale):3  Any medication changes, allergies to medications, adverse drug reactions, diagnosis change, or new procedure performed?: [x] No    [] Yes (see summary sheet for update)  Subjective functional status/changes:   [] No changes reported  Barely any pain today. OBJECTIVE    Modality rationale: decrease edema and decrease pain to improve the patients ability to function   Type Additional Details   [x]  Ice     []  Heat      10 minutes   Position: supine,left LE supported  Location (R) ant hip     [x] Skin assessment post-treatment:  [x]intact []redness- no adverse reaction    []redness - adverse reaction:     55 min Therapeutic Exercise:  [x] See flow sheet :   Rationale: increase ROM and increase strength to improve the patients ability to improve function            With   [x] TE   [] TA   [] neuro   [] other: Patient Education: [x] Review HEP    [] Progressed/Changed HEP based on:   [] positioning   [] body mechanics   [] transfers   [] heat/ice application    [x] other: Extended discussion re: upright trunk posture to include mirror and tactile cues. Other Objective/Functional Measures:      Pain Level (0-10 scale) post treatment: 0    ASSESSMENT/Changes in Function:   Step ups greatly improved today with mirror cue. Good upright posture noted. Patient will continue to benefit from skilled PT services to modify and progress therapeutic interventions to attain remaining goals.      [x]  See Plan of Care  []  See progress note/recertification  []  See Discharge Summary         Progress towards goals / Updated goals:  Short Term Goals: To be accomplished in 2 treatments:                         7.) The patient will be independent with their HEP consistently for at least one week. Long Term Goals: To be accomplished in 16 treatments:                         3.) The patient will have at most 2/10 pain with daily activities.                        5.) The patient will be able to ambulate for at least 30min without an AD or having to change positions due to pain.                         3.) The patient will improve their FOTO score from 56 to at least 65 to show improvements in functional mobility. 4.) The patient will be able to comfortably get down to and up from the ground with minimal support in order to comfortably return to work. Frequency / Duration: Patient to be seen 2 times per week for 16 treatments  PLAN  [x]  Upgrade activities as tolerated     [x]  Continue plan of care  []  Update interventions per flow sheet       []  Discharge due to:_  []  Other:_      Emigdio Bardales.  Prosper, PT 8/12/2020

## 2020-08-17 DIAGNOSIS — Z96.641 AFTERCARE FOLLOWING RIGHT HIP JOINT REPLACEMENT SURGERY: ICD-10-CM

## 2020-08-17 DIAGNOSIS — M16.11 UNILATERAL PRIMARY OSTEOARTHRITIS, RIGHT HIP: Primary | ICD-10-CM

## 2020-08-17 DIAGNOSIS — Z47.1 AFTERCARE FOLLOWING RIGHT HIP JOINT REPLACEMENT SURGERY: ICD-10-CM

## 2020-08-18 ENCOUNTER — OFFICE VISIT (OUTPATIENT)
Dept: ORTHOPEDIC SURGERY | Age: 60
End: 2020-08-18
Payer: COMMERCIAL

## 2020-08-18 ENCOUNTER — HOSPITAL ENCOUNTER (OUTPATIENT)
Dept: GENERAL RADIOLOGY | Age: 60
Discharge: HOME | End: 2020-08-18
Payer: COMMERCIAL

## 2020-08-18 ENCOUNTER — HOSPITAL ENCOUNTER (OUTPATIENT)
Dept: PHYSICAL THERAPY | Age: 60
Discharge: HOME | End: 2020-08-18
Payer: COMMERCIAL

## 2020-08-18 VITALS
BODY MASS INDEX: 32.65 KG/M2 | HEIGHT: 67 IN | SYSTOLIC BLOOD PRESSURE: 99 MMHG | TEMPERATURE: 96.4 F | DIASTOLIC BLOOD PRESSURE: 65 MMHG | HEART RATE: 74 BPM | OXYGEN SATURATION: 98 % | WEIGHT: 208 LBS

## 2020-08-18 DIAGNOSIS — Z47.1 AFTERCARE FOLLOWING RIGHT HIP JOINT REPLACEMENT SURGERY: ICD-10-CM

## 2020-08-18 DIAGNOSIS — Z96.641 AFTERCARE FOLLOWING RIGHT HIP JOINT REPLACEMENT SURGERY: ICD-10-CM

## 2020-08-18 DIAGNOSIS — Z96.641 S/P HIP REPLACEMENT, RIGHT: Primary | ICD-10-CM

## 2020-08-18 PROCEDURE — 97110 THERAPEUTIC EXERCISES: CPT

## 2020-08-18 PROCEDURE — 99024 POSTOP FOLLOW-UP VISIT: CPT | Performed by: ORTHOPAEDIC SURGERY

## 2020-08-18 PROCEDURE — 73502 X-RAY EXAM HIP UNI 2-3 VIEWS: CPT

## 2020-08-18 NOTE — PROGRESS NOTES
PT DAILY TREATMENT NOTE 2-15    Patient Name: Blossom Chong  Date:2020  : 1960  [x]  Patient  Verified  Payor: Radha Sanchez / Plan: Paula Beverly HMO / Product Type: HMO /    In time: 1:45 PM  Out time: 2:43 PM  Total Treatment Time (min): 58 minutes  Visit #:  4    Treatment Area: Presence of right artificial hip joint [Z96.641]    SUBJECTIVE  Pain Level (0-10 scale): 2.5/10  Any medication changes, allergies to medications, adverse drug reactions, diagnosis change, or new procedure performed?: - No    - Yes (see summary sheet for update)  Subjective functional status/changes:   - No changes reported  The Pt had a f/u with Dr. Darline Braswell this morning that per Pt. Report went well and she doesn't have to go back for another 6 wks. She reports that her hip is feeling better overall. She is using her quad cane less frequently- uses it in the community and when she is fatigued. Her standing and walking tolerance are gradually improving. OBJECTIVE    58 min Therapeutic Exercise:  [x] See flow sheet :   Rationale: increase ROM, increase strength, improve coordination, improve balance and increase proprioception to improve the patients ability to ambulate and perform ADLs with less pain or discomfort. With   [x] TE   [] TA   [] neuro   [] other: Patient Education: [x] Review HEP    [] Progressed/Changed HEP based on:   [] positioning   [] body mechanics   [] transfers   [] heat/ice application    [] other:      Other Objective/Functional Measures: None noted     Pain Level (0-10 scale) post treatment: 2/10    ASSESSMENT/Changes in Function:   Today, helped progress the difficulty of several of her hip strengthening exercises that she tolerated very well. She was fatigued on the end of the session, but no increased pain. Will continue to progress her difficulty as tolerated during next PT session.   Patient will continue to benefit from skilled PT services to modify and progress therapeutic interventions, address functional mobility deficits, address ROM deficits, address strength deficits, analyze and address soft tissue restrictions, analyze and cue movement patterns, analyze and modify body mechanics/ergonomics, assess and modify postural abnormalities and instruct in home and community integration to attain remaining goals.      []  See Plan of Care  []  See progress note/recertification  []  See Discharge Summary         Progress towards goals / Updated goals:  Short Term Goals: To be accomplished in 2 treatments:  1.) The patient will be independent with their HEP consistently for at least one week- progressing  Long Term Goals: To be accomplished in 16 treatments:  1.) The patient will have at most 2/10 pain with daily activities- progressing  2.) The patient will be able to ambulate for at least 30min without an AD or having to change positions due to pain- progressing  3.) The patient will improve their FOTO score from 56 to at least 65 to show improvements in functional mobility- progressing  4.) The patient will be able to comfortably get down to and up from the ground with minimal support in order to comfortably return to work- progressing    PLAN  [x]  Upgrade activities as tolerated     [x]  Continue plan of care  [x]  Update interventions per flow sheet       []  Discharge due to:_  []  Other:_      April Amezcua, PT 8/18/2020

## 2020-08-18 NOTE — PROGRESS NOTES
Identified pt with two pt identifiers (name and ). Reviewed chart in preparation for visit and have obtained necessary documentation. Cherri Espionza is a 61 y.o. female  Chief Complaint   Patient presents with    Surgical Follow-up     RT hip     Visit Vitals  BP 99/65 (BP 1 Location: Right arm, BP Patient Position: Sitting)   Pulse 74   Temp (!) 96.4 °F (35.8 °C) (Tympanic)   Ht 5' 7\" (1.702 m)   Wt 208 lb (94.3 kg)   SpO2 98%   BMI 32.58 kg/m²     1. Have you been to the ER, urgent care clinic since your last visit? Hospitalized since your last visit? No    2. Have you seen or consulted any other health care providers outside of the [address] [place] [address] since your last visit? Include any pap smears or colon screening.  No

## 2020-08-18 NOTE — PROGRESS NOTES
is here for a follow up visit from a total hip arthroplasty on the right side. The patient is doing well, with no medical complications since discharge. Pain has been appropriate since surgery,and the patient is progressing well with physical therapy. Current Outpatient Medications on File Prior to Visit   Medication Sig Dispense Refill    senna-docusate (PERICOLACE) 8.6-50 mg per tablet Take 1 Tab by mouth daily. 30 Tab 0    doxycycline (MONODOX) 100 mg capsule Take 200 mg by mouth two (2) times a day.  B.infantis-B.ani-B.long-B.bifi (Probiotic 4X) 10-15 mg TbEC Take 10-15 mg by mouth daily.  omega 3-dha-epa-fish oil (Fish Oil) 100-160-1,000 mg cap Take 1,000 mg by mouth daily. take 1 tablet daily      calcium carbonate (CALCIUM 600 PO) Take 1,000 mg by mouth daily.  fexofenadine-pseudoephedrine (Allegra-D 12 Hour)  mg Tb12 Take 1 Tab by mouth every twelve (12) hours.  OTHER Occuvite   Ruten-gums      furosemide (LASIX) 40 mg tablet TAKE 1 TABLET BY MOUTH EVERY DAY      metoprolol succinate (TOPROL-XL) 25 mg XL tablet TAKE 1 TABLET BY MOUTH EVERY DAY      Entresto 24-26 mg tablet TAKE 1 TABLET BY MOUTH TWICE A DAY      Levothyroxine 150 mcg cap Take 150 mcg by mouth Daily (before breakfast). No current facility-administered medications on file prior to visit. ROS:  General: denies agitation, major chest pain, unexpected weakness  Hip pain is managed with nighttime tylenol. Skin: healing wound is without issue. Strength: appropriate weakness of involved extremity is resolving since surgery      Physical Examination:    Blood pressure 99/65, pulse 74, temperature (!) 96.4 °F (35.8 °C), temperature source Tympanic, height 5' 7\" (1.702 m), weight 208 lb (94.3 kg), SpO2 98 %. Skin: no significant drainage, no wound dehiscence. Sensation intact to light touch at level of wound and distally.  Lateral femoral cutaneous nerve function is intact .  Strength is 5/5 with hip flexion and abduction  Leg lengths are clinically equal  Distal swelling is noted, but appropriate for postoperative course  Distal capillary refill less than 2 seconds      Imaging:    Postoperative xrays are reviewed, they indicate a right total hip arthroplasty in excellent position without evidence of loosening or failure. Leg lengths are equal through the hip.       Assessment: Status post right total hip arthroplasty    Plan:  Patient will continue physical therapy, with the goal of outpatient therapy and then home exercise program as soon as is possible  Wound care and dental prophylaxis instructions were reviewed  Continue to weight bear as tolerated without restriction, except to keep to the positions of comfort  Deep Venous Thrombosis Prophylaxis: none    Follow up will be at 6 weeks from now,  no xrays on follow up

## 2020-08-20 ENCOUNTER — HOSPITAL ENCOUNTER (OUTPATIENT)
Dept: PHYSICAL THERAPY | Age: 60
Discharge: HOME | End: 2020-08-20
Payer: COMMERCIAL

## 2020-08-20 PROCEDURE — 97110 THERAPEUTIC EXERCISES: CPT

## 2020-08-20 NOTE — PROGRESS NOTES
PT DAILY TREATMENT NOTE 2-15    Patient Name: Saadia Zavala  Date:2020  : 1960  [x]  Patient  Verified  Payor: Rasheed Darling / Plan: Isa Smith HMO / Product Type: HMO /    In time: 9:34 AM  Out time: 10:31 AM  Total Treatment Time (min): 57 minutes  Visit #:  5    Treatment Area: Presence of right artificial hip joint [Z96.641]    SUBJECTIVE  Pain Level (0-10 scale): 1/10  Any medication changes, allergies to medications, adverse drug reactions, diagnosis change, or new procedure performed?: [x] No    [] Yes (see summary sheet for update)  Subjective functional status/changes:   [] No changes reported  The Pt reports that she was very sore after her last session and felt like she had had a good work out. She reports that she did the exercises as well as some housework and it helped to reduce the soreness. Today, she brings her cane and is carrying it and has it \"just in case I get too tired at the end\". OBJECTIVE    57 min Therapeutic Exercise:  [x] See flow sheet :   Rationale: increase ROM, increase strength, improve coordination, improve balance and increase proprioception to improve the patients ability to ambulate and perform ADLs with less pain or discomfort. With   [x] TE   [] TA   [] neuro   [] other: Patient Education: [x] Review HEP    [] Progressed/Changed HEP based on:   [] positioning   [] body mechanics   [] transfers   [] heat/ice application    [] other:      Other Objective/Functional Measures: None noted     Pain Level (0-10 scale) post treatment: 1/10    ASSESSMENT/Changes in Function:   The Pt tolerated the additions to her therapy program well without increased pain and appropriate fatigue noted. Will start to incorporate more balance and neuromuscular control exercises in addition to her strength building exercises as tolerated.   Patient will continue to benefit from skilled PT services to modify and progress therapeutic interventions, address functional mobility deficits, address ROM deficits, address strength deficits, analyze and address soft tissue restrictions, analyze and cue movement patterns, analyze and modify body mechanics/ergonomics, assess and modify postural abnormalities and instruct in home and community integration to attain remaining goals.      []  See Plan of Care  []  See progress note/recertification  []  See Discharge Summary         Progress towards goals / Updated goals:  Short Term Goals: To be accomplished in 2 treatments:  1.) The patient will be independent with their HEP consistently for at least one week- progressing  Long Term Goals: To be accomplished in 16 treatments:  1.) The patient will have at most 2/10 pain with daily activities- progressing  2.) The patient will be able to ambulate for at least 30min without an AD or having to change positions due to pain- progressing  3.) The patient will improve their FOTO score from 56 to at least 65 to show improvements in functional mobility- progressing  4.) The patient will be able to comfortably get down to and up from the ground with minimal support in order to comfortably return to work- progressing    PLAN  [x]  Upgrade activities as tolerated     [x]  Continue plan of care  [x]  Update interventions per flow sheet       []  Discharge due to:_  []  Other:_      Nikki Vyas, PT 8/20/2020

## 2020-08-25 ENCOUNTER — HOSPITAL ENCOUNTER (OUTPATIENT)
Dept: PHYSICAL THERAPY | Age: 60
Discharge: HOME | End: 2020-08-25
Payer: COMMERCIAL

## 2020-08-25 PROCEDURE — 97110 THERAPEUTIC EXERCISES: CPT

## 2020-08-25 NOTE — PROGRESS NOTES
PT DAILY TREATMENT NOTE 2-15    Patient Name: Cherri Espinoza  Date:2020  : 1960  [x]  Patient  Verified  Payor: Amalia Juarez / Plan: Tiana Branham HMO / Product Type: HMO /    In time: 9:33 AM  Out time: 10:31 AM  Total Treatment Time (min): 58 minutes  Visit #: 8     Treatment Area: Presence of right artificial hip joint [Z96.641]    SUBJECTIVE  Pain Level (0-10 scale): 3/10  Any medication changes, allergies to medications, adverse drug reactions, diagnosis change, or new procedure performed?: [x] No    [] Yes (see summary sheet for update)  Subjective functional status/changes:   [] No changes reported  The Pt reports that she is sore today. Yesterday she was on her feet for 1.5 hrs straight- pain started after 1 hr and able to push the last 30 minutes. The pain is along the lateral R hip. OBJECTIVE    58 min Therapeutic Exercise:  [x] See flow sheet :   Rationale: increase ROM, increase strength, improve coordination, improve balance and increase proprioception to improve the patients ability to ambulate and perform ADLs with less pain or discomfort. With   [x] TE   [] TA   [] neuro   [] other: Patient Education: [x] Review HEP    [] Progressed/Changed HEP based on:   [] positioning   [] body mechanics   [] transfers   [] heat/ice application    [] other:      Other Objective/Functional Measures: None noted     Pain Level (0-10 scale) post treatment: 3/10    ASSESSMENT/Changes in Function:   The Pt tolerated her therapy program well. She is progressing nicely with her strength exercises. Will progress as tolerated during next PT session.   Patient will continue to benefit from skilled PT services to modify and progress therapeutic interventions, address functional mobility deficits, address ROM deficits, address strength deficits, analyze and address soft tissue restrictions, analyze and cue movement patterns, analyze and modify body mechanics/ergonomics, assess and modify postural abnormalities and instruct in home and community integration to attain remaining goals.      []  See Plan of Care  []  See progress note/recertification  []  See Discharge Summary         Progress towards goals / Updated goals:  Short Term Goals: To be accomplished in 2 treatments:  1.) The patient will be independent with their HEP consistently for at least one week- progressing  Long Term Goals: To be accomplished in 16 treatments:  1.) The patient will have at most 2/10 pain with daily activities- progressing  2.) The patient will be able to ambulate for at least 30min without an AD or having to change positions due to pain- progressing  3.) The patient will improve their FOTO score from 56 to at least 65 to show improvements in functional mobility- progressing  4.) The patient will be able to comfortably get down to and up from the ground with minimal support in order to comfortably return to work- progressing    PLAN  [x]  Upgrade activities as tolerated     [x]  Continue plan of care  [x]  Update interventions per flow sheet       []  Discharge due to:_  []  Other:_      Omero Yanes, PT 8/25/2020

## 2020-08-27 ENCOUNTER — HOSPITAL ENCOUNTER (OUTPATIENT)
Dept: PHYSICAL THERAPY | Age: 60
Discharge: HOME | End: 2020-08-27
Payer: COMMERCIAL

## 2020-08-27 PROCEDURE — 97110 THERAPEUTIC EXERCISES: CPT

## 2020-08-27 NOTE — PROGRESS NOTES
[facility]  [address] ([place]), [address] [facility] [place] [address]  Phone: [phone] Fax: [phone]    Progress Note    Name: Manuela Alexander   : 1960   MD: Charo Vicente, DO       Treatment Diagnosis: Presence of right artificial hip joint [Z96.641]  Start of Care: 2020    Visits from Start of Care: 9  Missed Visits: 0    Summary of Care: The Pt has been seen for 9 outpatient physical therapy sessions s/p right CISCO on 2020. The Pt's therapy program has focused on regaining her R hip ROM, improving her hip and core strength and stability, regaining her balance and neuromuscular control, correcting her gait impairments, and improving her activity tolerance and endurance via therapeutic exercises and pain relieving modalities. The Pt reports that her R hip pain has improved greatly and is much more manageable. She continues to have to take oxycodone PRN, but it is becoming less frequent. She now only has to use her quad cane when she is very fatigued and tired after a long therapy session, but otherwise is able to ambulate independently without problems. She is now able to be on her feet for ~1.5hrs before she starts to feel soreness and need to sit down. She reports that her incision is healing nicely. She continues to have pain wake her at night after ~4 hrs, but if she gets up and walks around she is able to return to sleep. Overall, she believes that she is 70% improved since beginning therapy. Recommend continued PT for an additional 8 sessions to help progress her remaining impairments to allow the Pt to safely return to her PLOF and full work duties without pain or discomfort.     Progress towards goals / Updated goals:   Short Term Goals: To be accomplished in 2 treatments:  1.) The patient will be independent with their HEP consistently for at least one week- met  Long Term Goals: To be accomplished in 16 treatments:  1.) The patient will have at most 2/10 pain with daily activities- progressing  2.) The patient will be able to ambulate for at least 30min without an AD or having to change positions due to pain- met  3.) The patient will improve their FOTO score from 56 to at least 65 to show improvements in functional mobility- progressing (61/100)  4.) The patient will be able to comfortably get down to and up from the ground with minimal support in order to comfortably return to work- progressing    Rafael Mcconnell, PT 8/27/2020     ________________________________________________________________________  NOTE TO PHYSICIAN:  Please complete the following and fax to: [facility]: [phone]  . Retain this original for your records. If you are unable to process this request in 24 hours, please contact our office.        ____ I have read the above report and request that my patient continue therapy with the following changes/special instructions:  ____ I have read the above report and request that my patient be discharged from therapy    Physician's Signature:_________________ Date:___________Time:__________

## 2020-08-27 NOTE — PROGRESS NOTES
PT DAILY TREATMENT NOTE 2-15    Patient Name: Rocky Pisano  Date:2020  : 1960  [x]  Patient  Verified  Payor: Rosalba Salomon / Plan: Rachel Winters HMO / Product Type: HMO /    In time: 9:30 AM  Out time: 10:33 AM  Total Treatment Time (min): 63 minutes  Visit #: 9     Treatment Area: Presence of right artificial hip joint [Z96.641]    SUBJECTIVE  Pain Level (0-10 scale): 210  Any medication changes, allergies to medications, adverse drug reactions, diagnosis change, or new procedure performed?: [x] No    [] Yes (see summary sheet for update)  Subjective functional status/changes:   [] No changes reported  The Pt reports increased soreness after her therapy session on Tuesday. She was able to finally bend over and shave her legs in the shower while sitting in the shower chair. She reports that her hip pain is much better and is more manageable. She has to take an oxycodone PRN for her pain, but it is less frequent. She has not had to use her quad cane much at all at this time. She only uses her cane when she is tired and fatigued and feels more unstable. She reports that her incision is healing nicely. She is able to be up on her feet for ~1.5 hrs without significant pain. She continues to have pain that wakes her up in the evenings after ~4 hrs, but if she gets up and walks around her she is able to return to sleep. OBJECTIVE    63 min Therapeutic Exercise:  [x] See flow sheet :   Rationale: increase ROM, increase strength, improve coordination, improve balance and increase proprioception to improve the patients ability to ambulate and perform ADLs with less pain or discomfort.        With   [x] TE   [] TA   [] neuro   [] other: Patient Education: [x] Review HEP    [] Progressed/Changed HEP based on:   [] positioning   [] body mechanics   [] transfers   [] heat/ice application    [] other:      Other Objective/Functional Measures:  FOTO 61/100 (56/100 at initial evaluation)     Pain Level (0-10 scale) post treatment: 2/10    ASSESSMENT/Changes in Function:   Patient will continue to benefit from skilled PT services to modify and progress therapeutic interventions, address functional mobility deficits, address ROM deficits, address strength deficits, analyze and address soft tissue restrictions, analyze and cue movement patterns, analyze and modify body mechanics/ergonomics, assess and modify postural abnormalities and instruct in home and community integration to attain remaining goals.      []  See Plan of Care  [x]  See progress note/recertification  []  See Discharge Summary         Progress towards goals / Updated goals:   Short Term Goals: To be accomplished in 2 treatments:  1.) The patient will be independent with their HEP consistently for at least one week- met  Long Term Goals: To be accomplished in 16 treatments:  1.) The patient will have at most 2/10 pain with daily activities- progressing  2.) The patient will be able to ambulate for at least 30min without an AD or having to change positions due to pain- met  3.) The patient will improve their FOTO score from 56 to at least 65 to show improvements in functional mobility- progressing (61/100)  4.) The patient will be able to comfortably get down to and up from the ground with minimal support in order to comfortably return to work- progressing    PLAN  [x]  Upgrade activities as tolerated     [x]  Continue plan of care  [x]  Update interventions per flow sheet       []  Discharge due to:_  []  Other:_      Gurmeet Galeas, PT 8/27/2020

## 2020-08-31 DIAGNOSIS — M16.0 BILATERAL PRIMARY OSTEOARTHRITIS OF HIP: ICD-10-CM

## 2020-08-31 RX ORDER — MELOXICAM 15 MG/1
15 TABLET ORAL DAILY
Qty: 60 TAB | Refills: 1 | Status: SHIPPED | OUTPATIENT
Start: 2020-08-31

## 2020-09-01 ENCOUNTER — HOSPITAL ENCOUNTER (OUTPATIENT)
Dept: PHYSICAL THERAPY | Age: 60
Discharge: HOME | End: 2020-09-01
Payer: COMMERCIAL

## 2020-09-01 PROCEDURE — 97110 THERAPEUTIC EXERCISES: CPT

## 2020-09-01 NOTE — PROGRESS NOTES
PT DAILY TREATMENT NOTE 2-15    Patient Name: Katharine [facility]  Date:2020  : 1960  [x]  Patient  Verified  Payor: Chucky Toussaint / Plan: Lui Shen HMO / Product Type: HMO /    In time: 9:31 AM  Out time: 10:27 AM  Total Treatment Time (min): 56 minutes  Visit #: 10  Treatment Area: Presence of right artificial hip joint [Z96.641]    SUBJECTIVE  Pain Level (0-10 scale): 3/10  Any medication changes, allergies to medications, adverse drug reactions, diagnosis change, or new procedure performed?: [x] No    [] Yes (see summary sheet for update)  Subjective functional status/changes:   [] No changes reported  The Pt reports that she was on her feet a lot over the weekend and thinks that she \"over did it\". She is more sore and achy in the R hip. OBJECTIVE    56 min Therapeutic Exercise:  [x] See flow sheet :   Rationale: increase ROM, increase strength, improve coordination, improve balance and increase proprioception to improve the patients ability to ambulate and perform ADLs with less pain or discomfort. With   [x] TE   [] TA   [] neuro   [] other: Patient Education: [x] Review HEP    [] Progressed/Changed HEP based on:   [] positioning   [] body mechanics   [] transfers   [] heat/ice application    [] other:      Other Objective/Functional Measures: None noted     Pain Level (0-10 scale) post treatment: 3/10    ASSESSMENT/Changes in Function:   The Pt tolerated her therapy program well. Progressed her resistance and added in more functional and challenging exercises that she tolerated nicely. She reported appropriate fatigue and no increased pain.   Patient will continue to benefit from skilled PT services to modify and progress therapeutic interventions, address functional mobility deficits, address ROM deficits, address strength deficits, analyze and address soft tissue restrictions, analyze and cue movement patterns, analyze and modify body mechanics/ergonomics, assess and modify postural abnormalities and instruct in home and community integration to attain remaining goals.      []  See Plan of Care  []  See progress note/recertification  []  See Discharge Summary         Progress towards goals / Updated goals:  Short Term Goals: To be accomplished in 2 treatments:  1.) The patient will be independent with their HEP consistently for at least one week- met  Long Term Goals: To be accomplished in 16 treatments:  1.) The patient will have at most 2/10 pain with daily activities- progressing  2.) The patient will be able to ambulate for at least 30min without an AD or having to change positions due to pain- met  3.) The patient will improve their FOTO score from 56 to at least 65 to show improvements in functional mobility- progressing (61/100)  4.) The patient will be able to comfortably get down to and up from the ground with minimal support in order to comfortably return to work- progressing     PLAN  [x]  Upgrade activities as tolerated     [x]  Continue plan of care  [x]  Update interventions per flow sheet       []  Discharge due to:_  []  Other:_      Raman Amezcua, PT 9/1/2020

## 2020-09-02 ENCOUNTER — HOSPITAL ENCOUNTER (OUTPATIENT)
Dept: MAMMOGRAPHY | Age: 60
Discharge: HOME | End: 2020-09-02
Attending: FAMILY MEDICINE
Payer: COMMERCIAL

## 2020-09-02 DIAGNOSIS — Z12.39 SCREENING FOR BREAST CANCER: ICD-10-CM

## 2020-09-02 PROCEDURE — 77063 BREAST TOMOSYNTHESIS BI: CPT

## 2020-09-03 ENCOUNTER — HOSPITAL ENCOUNTER (OUTPATIENT)
Dept: PHYSICAL THERAPY | Age: 60
Discharge: HOME | End: 2020-09-03
Payer: COMMERCIAL

## 2020-09-03 PROCEDURE — 97110 THERAPEUTIC EXERCISES: CPT

## 2020-09-03 NOTE — PROGRESS NOTES
PT DAILY TREATMENT NOTE 2-15    Patient Name: Emile Swartz  Date:9/3/2020  : 1960  [x]  Patient  Verified  Payor: Nick Blake / Plan: Marixa Sinclair HMO / Product Type: HMO /    In time: 9:30 AM  Out time: 10:26 AM  Total Treatment Time (min): 56 minutes  Visit #: 11     Treatment Area: Presence of right artificial hip joint [Z96.641]    SUBJECTIVE  Pain Level (0-10 scale): 210  Any medication changes, allergies to medications, adverse drug reactions, diagnosis change, or new procedure performed?: [x] No    [] Yes (see summary sheet for update)  Subjective functional status/changes:   [] No changes reported  The Pt reports that she was very sore after her last session, but it is gradually improving. OBJECTIVE    56 min Therapeutic Exercise:  [x] See flow sheet :   Rationale: increase ROM, increase strength, improve coordination, improve balance and increase proprioception to improve the patients ability to perform ADLs and work duties with less pain or discomfort. With   [x] TE   [] TA   [] neuro   [] other: Patient Education: [x] Review HEP    [] Progressed/Changed HEP based on:   [] positioning   [] body mechanics   [] transfers   [] heat/ice application    [] other:      Other Objective/Functional Measures: None noted     Pain Level (0-10 scale) post treatment: 2/10    ASSESSMENT/Changes in Function:   Today, added in TRX squats and mini lunges to work on more work specific movement patterns. She did well with this, but was more fatigued after these exercises. Will progress as tolerated during next PT session.   Patient will continue to benefit from skilled PT services to modify and progress therapeutic interventions, address functional mobility deficits, address ROM deficits, address strength deficits, analyze and address soft tissue restrictions, analyze and cue movement patterns, analyze and modify body mechanics/ergonomics, assess and modify postural abnormalities and instruct in home and community integration to attain remaining goals.      []  See Plan of Care  []  See progress note/recertification  []  See Discharge Summary         Progress towards goals / Updated goals:  Short Term Goals: To be accomplished in 2 treatments:  1.) The patient will be independent with their HEP consistently for at least one week- met  Long Term Goals: To be accomplished in 16 treatments:  1.) The patient will have at most 2/10 pain with daily activities- progressing  2.) The patient will be able to ambulate for at least 30min without an AD or having to change positions due to pain- met  3.) The patient will improve their FOTO score from 56 to at least 65 to show improvements in functional mobility- progressing (61/100)  4.) The patient will be able to comfortably get down to and up from the ground with minimal support in order to comfortably return to work- progressing    PLAN  [x]  Upgrade activities as tolerated     [x]  Continue plan of care  [x]  Update interventions per flow sheet       []  Discharge due to:_  []  Other:_      Zafar Jalloh, PT 9/3/2020

## 2020-09-08 ENCOUNTER — HOSPITAL ENCOUNTER (OUTPATIENT)
Dept: PHYSICAL THERAPY | Age: 60
Discharge: HOME | End: 2020-09-08
Payer: COMMERCIAL

## 2020-09-08 PROCEDURE — 97110 THERAPEUTIC EXERCISES: CPT

## 2020-09-08 NOTE — PROGRESS NOTES
PT DAILY TREATMENT NOTE 2-15    Patient Name: Saadia Zavala  Date:2020  : 1960  [x]  Patient  Verified  Payor: Rasheed Darling / Plan: Isa Smith HMO / Product Type: HMO /    In time: 9:30 AM  Out time: 10:24 AM  Total Treatment Time (min): 54 minutes  Visit #: 12     Treatment Area: Presence of right artificial hip joint [Z96.641]    SUBJECTIVE  Pain Level (0-10 scale): 210  Any medication changes, allergies to medications, adverse drug reactions, diagnosis change, or new procedure performed?: [x] No    [] Yes (see summary sheet for update)  Subjective functional status/changes:   [] No changes reported  The Pt reports that she was very sore after her last session, but it got progressively better each day. She is trying to put more weight on the R LE in standing and bias it more. OBJECTIVE    54 min Therapeutic Exercise:  [x] See flow sheet :   Rationale: increase ROM, increase strength, improve coordination, improve balance and increase proprioception to improve the patients ability to ambulate and perform ADLs with less pain or discomfort. With   [x] TE   [] TA   [] neuro   [] other: Patient Education: [x] Review HEP    [] Progressed/Changed HEP based on:   [] positioning   [] body mechanics   [] transfers   [] heat/ice application    [] other:      Other Objective/Functional Measures: None noted     Pain Level (0-10 scale) post treatment: 2/10    ASSESSMENT/Changes in Function:   No new exercises were added in today to assess Pt's soreness compared to last session. She had better control with the lunges, but still did compensate slightly at the end of the ROM.   Patient will continue to benefit from skilled PT services to modify and progress therapeutic interventions, address functional mobility deficits, address ROM deficits, address strength deficits, analyze and address soft tissue restrictions, analyze and cue movement patterns, analyze and modify body mechanics/ergonomics, assess and modify postural abnormalities and instruct in home and community integration to attain remaining goals.      []  See Plan of Care  []  See progress note/recertification  []  See Discharge Summary         Progress towards goals / Updated goals:  Short Term Goals: To be accomplished in 2 treatments:  1.) The patient will be independent with their HEP consistently for at least one week- met  Long Term Goals: To be accomplished in 16 treatments:  1.) The patient will have at most 2/10 pain with daily activities- progressing  2.) The patient will be able to ambulate for at least 30min without an AD or having to change positions due to pain- met  3.) The patient will improve their FOTO score from 56 to at least 65 to show improvements in functional mobility- progressing (61/100)  4.) The patient will be able to comfortably get down to and up from the ground with minimal support in order to comfortably return to work- progressing    PLAN  [x]  Upgrade activities as tolerated     [x]  Continue plan of care  [x]  Update interventions per flow sheet       []  Discharge due to:_  []  Other:_      Omero Yanes, PT 9/8/2020

## 2020-09-10 ENCOUNTER — HOSPITAL ENCOUNTER (OUTPATIENT)
Dept: PHYSICAL THERAPY | Age: 60
Discharge: HOME | End: 2020-09-10
Payer: COMMERCIAL

## 2020-09-10 PROCEDURE — 97110 THERAPEUTIC EXERCISES: CPT

## 2020-09-10 NOTE — PROGRESS NOTES
PT DAILY TREATMENT NOTE 2-15    Patient Name: Rocky Pisano  Date:9/10/2020  : 1960  [x]  Patient  Verified  Payor: Rosalba Salomon / Plan: Rachel Winters HMO / Product Type: HMO /    In time: 9:30 AM  Out time: 10:24 AM  Total Treatment Time (min): 54 minutes  Visit #:  13    Treatment Area: Presence of right artificial hip joint [Z96.641]    SUBJECTIVE  Pain Level (0-10 scale): 210  Any medication changes, allergies to medications, adverse drug reactions, diagnosis change, or new procedure performed?: [x] No    [] Yes (see summary sheet for update)  Subjective functional status/changes:   [] No changes reported  The Pt reports that she was as sore yesterday as she was last week, but it dissipated very quickly. OBJECTIVE    54 min Therapeutic Exercise:  [x] See flow sheet :   Rationale: increase ROM, increase strength, improve coordination, improve balance and increase proprioception to improve the patients ability to perform ADLs and work duties with less pain or discomfort. With   [x] TE   [] TA   [] neuro   [] other: Patient Education: [x] Review HEP    [] Progressed/Changed HEP based on:   [] positioning   [] body mechanics   [] transfers   [] heat/ice application    [] other:      Other Objective/Functional Measures: None noted     Pain Level (0-10 scale) post treatment: 210    ASSESSMENT/Changes in Function:   The Pt tolerated the new exercises well. She is able to perform more challenging and dynamic exercises well, but does continue to fatigue quickly. Working on improving her muscular endurance.   Patient will continue to benefit from skilled PT services to modify and progress therapeutic interventions, address functional mobility deficits, address ROM deficits, address strength deficits, analyze and address soft tissue restrictions, analyze and cue movement patterns, analyze and modify body mechanics/ergonomics, assess and modify postural abnormalities and instruct in home and community integration to attain remaining goals.      []  See Plan of Care  []  See progress note/recertification  []  See Discharge Summary         Progress towards goals / Updated goals:  Short Term Goals: To be accomplished in 2 treatments:  1.) The patient will be independent with their HEP consistently for at least one week- met  Long Term Goals: To be accomplished in 16 treatments:  1.) The patient will have at most 2/10 pain with daily activities- progressing  2.) The patient will be able to ambulate for at least 30min without an AD or having to change positions due to pain- met  3.) The patient will improve their FOTO score from 56 to at least 65 to show improvements in functional mobility- progressing (61/100)  4.) The patient will be able to comfortably get down to and up from the ground with minimal support in order to comfortably return to work- progressing    PLAN  [x]  Upgrade activities as tolerated     [x]  Continue plan of care  [x]  Update interventions per flow sheet       []  Discharge due to:_  []  Other:_      Sunil Worley, PT 9/10/2020

## 2020-09-14 DIAGNOSIS — Z96.641 S/P HIP REPLACEMENT, RIGHT: ICD-10-CM

## 2020-09-14 RX ORDER — OXYCODONE HYDROCHLORIDE 5 MG/1
5-10 TABLET ORAL
Qty: 60 TAB | Refills: 0 | Status: SHIPPED | OUTPATIENT
Start: 2020-09-14 | End: 2020-09-28

## 2020-09-15 ENCOUNTER — HOSPITAL ENCOUNTER (OUTPATIENT)
Dept: PHYSICAL THERAPY | Age: 60
Discharge: HOME | End: 2020-09-15
Payer: COMMERCIAL

## 2020-09-15 PROCEDURE — 97140 MANUAL THERAPY 1/> REGIONS: CPT

## 2020-09-15 PROCEDURE — 97110 THERAPEUTIC EXERCISES: CPT

## 2020-09-15 NOTE — PROGRESS NOTES
PT DAILY TREATMENT NOTE 2-15    Patient Name: Piyush Escobedo  Date:9/15/2020  : 1960  [x]  Patient  Verified  Payor: Conchita Skill / Plan: William Carbajal HMO / Product Type: HMO /    In time: 9:30 AM  Out time: 10:31 AM  Total Treatment Time (min): 61 minutes  Visit #: 14  Treatment Area: Presence of right artificial hip joint [Z96.641]    SUBJECTIVE  Pain Level (0-10 scale): 4/10  Any medication changes, allergies to medications, adverse drug reactions, diagnosis change, or new procedure performed?: [x] No    [] Yes (see summary sheet for update)  Subjective functional status/changes:   [] No changes reported  The Pt reports that she had significant soreness and knots in the R quadriceps after the last session. The pain was mainly in the muscle and there was minimal pain in the R hip. She tried to massage it out and reports that it is a little looser, but still very tight. She had to use her cane to ambulate Friday, Saturday, and ; yesterday she was able \"to hobble around\" without the cane. She had to take oxycodone on Friday and Saturday due to the pain. OBJECTIVE    46 min Therapeutic Exercise:  [x] See flow sheet :   Rationale: increase ROM, increase strength, improve coordination, improve balance and increase proprioception to improve the patients ability to ambulate and perform ADLs with less pain or discomfort. 15 min Manual Therapy: STM and trigger point release to R quadriceps using mini roller    Rationale: decrease pain, increase ROM, increase tissue extensibility, decrease trigger points and increase postural awareness to improve the patients ability to ambulate and perform ADLs with less pain or discomfort.       With   [x] TE   [] TA   [] neuro   [] other: Patient Education: [x] Review HEP    [] Progressed/Changed HEP based on:   [] positioning   [] body mechanics   [] transfers   [] heat/ice application    [] other:      Other Objective/Functional Measures: None noted     Pain Level (0-10 scale) post treatment: 2/10    ASSESSMENT/Changes in Function:   The Pt did have increased turgor throughout the R quadriceps that worked well to using the roller to reduce the restrictions and trigger points. She was able to perform her exercises well with fatigue noted, but no increased pain. Will progress as tolerated during next PT session. Patient will continue to benefit from skilled PT services to modify and progress therapeutic interventions, address functional mobility deficits, address ROM deficits, address strength deficits, analyze and address soft tissue restrictions, analyze and cue movement patterns, analyze and modify body mechanics/ergonomics, assess and modify postural abnormalities and instruct in home and community integration to attain remaining goals.      []  See Plan of Care  []  See progress note/recertification  []  See Discharge Summary         Progress towards goals / Updated goals:  Short Term Goals: To be accomplished in 2 treatments:  1.) The patient will be independent with their HEP consistently for at least one week- met  Long Term Goals: To be accomplished in 16 treatments:  1.) The patient will have at most 2/10 pain with daily activities- progressing  2.) The patient will be able to ambulate for at least 30min without an AD or having to change positions due to pain- met  3.) The patient will improve their FOTO score from 56 to at least 65 to show improvements in functional mobility- progressing (61/100)  4.) The patient will be able to comfortably get down to and up from the ground with minimal support in order to comfortably return to work- progressing    PLAN  [x]  Upgrade activities as tolerated     [x]  Continue plan of care  [x]  Update interventions per flow sheet       []  Discharge due to:_  []  Other:_      Joyce Solomon, PT 9/15/2020

## 2020-09-17 ENCOUNTER — HOSPITAL ENCOUNTER (OUTPATIENT)
Dept: PHYSICAL THERAPY | Age: 60
Discharge: HOME | End: 2020-09-17
Payer: COMMERCIAL

## 2020-09-17 PROCEDURE — 97110 THERAPEUTIC EXERCISES: CPT

## 2020-09-17 NOTE — PROGRESS NOTES
PT DAILY TREATMENT NOTE 2-15    Patient Name: Danyel Ariza  Date:2020  : 1960  [x]  Patient  Verified  Payor: Fuad Anderson / Plan: Darylene Ronde HMO / Product Type: HMO /    In time: 9:30 AM  Out time: 10:31 AM  Total Treatment Time (min): 61 minutes  Visit #: 15     Treatment Area: Presence of right artificial hip joint [Z96.641]    SUBJECTIVE  Pain Level (0-10 scale): 210  Any medication changes, allergies to medications, adverse drug reactions, diagnosis change, or new procedure performed?: [x] No    [] Yes (see summary sheet for update)  Subjective functional status/changes:   [] No changes reported  The Pt reports that changing the level of the total gym helped significantly with the R quadriceps soreness. She reports that she was still sore, but it was not as severe and she did not have to use the cane. OBJECTIVE    61 min Therapeutic Exercise:  [x] See flow sheet :   Rationale: increase ROM, increase strength, improve coordination, improve balance and increase proprioception to improve the patients ability to ambulate and perform ADLs with less pain or discomfort. With   [x] TE   [] TA   [] neuro   [] other: Patient Education: [x] Review HEP    [] Progressed/Changed HEP based on:   [] positioning   [] body mechanics   [] transfers   [] heat/ice application    [] other:      Other Objective/Functional Measures: None noted     Pain Level (0-10 scale) post treatment: 10    ASSESSMENT/Changes in Function:   Did not add in any new exercises, but progressed and challenged Pt with increased band resistance and less support with some of her exercises. She tolerated this well. She had more difficulty progressing the lunges due to R hip pain, so these were not progressed.   Patient will continue to benefit from skilled PT services to modify and progress therapeutic interventions, address functional mobility deficits, address ROM deficits, address strength deficits, analyze and address soft tissue restrictions, analyze and cue movement patterns, analyze and modify body mechanics/ergonomics, assess and modify postural abnormalities and instruct in home and community integration to attain remaining goals.      []  See Plan of Care  []  See progress note/recertification  []  See Discharge Summary         Progress towards goals / Updated goals:  Short Term Goals: To be accomplished in 2 treatments:  1.) The patient will be independent with their HEP consistently for at least one week- met  Long Term Goals: To be accomplished in 16 treatments:  1.) The patient will have at most 2/10 pain with daily activities- progressing  2.) The patient will be able to ambulate for at least 30min without an AD or having to change positions due to pain- met  3.) The patient will improve their FOTO score from 56 to at least 65 to show improvements in functional mobility- progressing (61/100)  4.) The patient will be able to comfortably get down to and up from the ground with minimal support in order to comfortably return to work- progressing    PLAN  [x]  Upgrade activities as tolerated     [x]  Continue plan of care  [x]  Update interventions per flow sheet       []  Discharge due to:_  []  Other:_      Ruben Lozano, PT 9/17/2020

## 2020-09-22 ENCOUNTER — HOSPITAL ENCOUNTER (OUTPATIENT)
Dept: PHYSICAL THERAPY | Age: 60
Discharge: HOME | End: 2020-09-22
Payer: COMMERCIAL

## 2020-09-22 PROCEDURE — 97110 THERAPEUTIC EXERCISES: CPT

## 2020-09-22 NOTE — PROGRESS NOTES
PT DAILY TREATMENT NOTE 2-15    Patient Name: Speedy Caldera  Date:2020  : 1960  [x]  Patient  Verified  Payor: Anastacio Mojica / Plan: Rajani Ibanez HMO / Product Type: HMO /    In time: 9:30 AM  Out time: 10:33 AM  Total Treatment Time (min): 63 minutes  Visit #: 15     Treatment Area: Presence of right artificial hip joint [Z96.641]    SUBJECTIVE  Pain Level (0-10 scale): 2/10  Any medication changes, allergies to medications, adverse drug reactions, diagnosis change, or new procedure performed?: [x] No    [] Yes (see summary sheet for update)  Subjective functional status/changes:   [] No changes reported  The Pt reports that she was slightly sore with the new theraband resistance, but it was not painful and dissipated quickly. She continues to have difficulty getting into her boyfriend's car because it is so low. OBJECTIVE    63 min Therapeutic Exercise:  [x] See flow sheet :   Rationale: increase ROM, increase strength, improve coordination, improve balance and increase proprioception to improve the patients ability to perform ADLs and work duties with less pain or discomfort. With   [x] TE   [] TA   [] neuro   [] other: Patient Education: [x] Review HEP    [] Progressed/Changed HEP based on:   [] positioning   [] body mechanics   [] transfers   [] heat/ice application    [] other:      Other Objective/Functional Measures: None noted     Pain Level (0-10 scale) post treatment: 1/10    ASSESSMENT/Changes in Function:   The Pt tolerated her therapy program well. She is progressing well with both her strengthening and endurance exercises.   Patient will continue to benefit from skilled PT services to modify and progress therapeutic interventions, address functional mobility deficits, address ROM deficits, address strength deficits, analyze and address soft tissue restrictions, analyze and cue movement patterns, analyze and modify body mechanics/ergonomics, assess and modify postural abnormalities and instruct in home and community integration to attain remaining goals.      []  See Plan of Care  []  See progress note/recertification  []  See Discharge Summary         Progress towards goals / Updated goals:  Short Term Goals: To be accomplished in 2 treatments:  1.) The patient will be independent with their HEP consistently for at least one week- met  Long Term Goals: To be accomplished in 16 treatments:  1.) The patient will have at most 2/10 pain with daily activities- progressing  2.) The patient will be able to ambulate for at least 30min without an AD or having to change positions due to pain- met  3.) The patient will improve their FOTO score from 56 to at least 65 to show improvements in functional mobility- progressing (61/100)  4.) The patient will be able to comfortably get down to and up from the ground with minimal support in order to comfortably return to work- progressing    PLAN  [x]  Upgrade activities as tolerated     [x]  Continue plan of care  [x]  Update interventions per flow sheet       []  Discharge due to:_  []  Other:_      Enrrique Wayne, PT 9/22/2020

## 2020-09-24 ENCOUNTER — HOSPITAL ENCOUNTER (OUTPATIENT)
Dept: PHYSICAL THERAPY | Age: 60
Discharge: HOME | End: 2020-09-24
Payer: COMMERCIAL

## 2020-09-24 PROCEDURE — 97110 THERAPEUTIC EXERCISES: CPT

## 2020-09-24 NOTE — PROGRESS NOTES
PT DAILY TREATMENT NOTE 2-15    Patient Name: Tyron Catherine  Date:2020  : 1960  [x]  Patient  Verified  Payor: Elizabeth Ramirez / Plan: Damaris Jalloh HMO / Product Type: HMO /    In time: 9:30 AM  Out time: 10:32 AM  Total Treatment Time (min): 62 minutes  Visit #: 17     Treatment Area: Presence of right artificial hip joint [Z96.641]    SUBJECTIVE  Pain Level (0-10 scale): 2/10  Any medication changes, allergies to medications, adverse drug reactions, diagnosis change, or new procedure performed?: [x] No    [] Yes (see summary sheet for update)  Subjective functional status/changes:   [] No changes reported  The Pt reports that her pain is doing much better overall. She has a constant ache in the R hip that is exacerbated with overuse. She is no longer using any assistive devices and able to ambulate independently. Her standing/walking tolerance is no longer limited by her hip, but more her back pain. She reports difficulty donning/doffing her socks/shoes and trimming her toenails. Overall, she believes that she is 40-45% improved since beginning therapy. OBJECTIVE    62 min Therapeutic Exercise:  [x] See flow sheet :   Rationale: increase ROM, increase strength, improve coordination, improve balance and increase proprioception to improve the patients ability to perform ADLs and work duties with less pain or discomfort.           With   [x] TE   [] TA   [] neuro   [] other: Patient Education: [x] Review HEP    [] Progressed/Changed HEP based on:   [] positioning   [] body mechanics   [] transfers   [] heat/ice application    [] other:      Other Objective/Functional Measures: FOTO 57/100 (56/100 at initial evaluation)     Pain Level (0-10 scale) post treatment: 3/10    ASSESSMENT/Changes in Function:     Patient will continue to benefit from skilled PT services to modify and progress therapeutic interventions, address functional mobility deficits, address ROM deficits, address strength deficits, analyze and address soft tissue restrictions, analyze and cue movement patterns, analyze and modify body mechanics/ergonomics, assess and modify postural abnormalities and instruct in home and community integration to attain remaining goals.      []  See Plan of Care  [x]  See progress note/recertification  []  See Discharge Summary         Progress towards goals / Updated goals:   Short Term Goals: To be accomplished in 2 treatments:  1.) The patient will be independent with their HEP consistently for at least one week- met  Long Term Goals: To be accomplished in 16 treatments:  1.) The patient will have at most 2/10 pain with daily activities- progressing  2.) The patient will be able to ambulate for at least 30min without an AD or having to change positions due to pain- met  3.) The patient will improve their FOTO score from 56 to at least 65 to show improvements in functional mobility- progressing (61/100)   4.) The patient will be able to comfortably get down to and up from the ground with minimal support in order to comfortably return to work- progressing    PLAN  [x]  Upgrade activities as tolerated     [x]  Continue plan of care  [x]  Update interventions per flow sheet       []  Discharge due to:_  []  Other:_      Janette Kate, PT 9/24/2020

## 2020-09-24 NOTE — PROGRESS NOTES
[facility]  [address] ([address]), [address]  Phone: [phone] Fax: [phone]    Progress Note    Name: Andre Harry   : 1960   MD: Osmany Courtney, DO       Treatment Diagnosis: Presence of right artificial hip joint [Z96.641]  Start of Care: 2020    Visits from Start of Care: 17  Missed Visits: 0    Summary of Care: The Pt has been seen for 17 outpatient physical therapy sessions s/p R CISCO on 2020. The Pt's therapy program has focused on improving her R hip functional mobility, improving her hip and core strength, improving her balance and neuromuscular control, correcting her gait impairments, and improving her activity tolerance and endurance via therapeutic exercises and pain relieving modalities. The Pt's R hip pain has improving significantly. She continues to have a mild dull ache that is constant and is only aggravated with significant overuse. Her R hip no longer limits her standing and walking tolerance; this is now limited by her LBP and is ~1.5 hrs. She is still unable to cross the R LE to don/doff socks/shoes and trim toenails secondary to pain. 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 Overall, she believes that she is 40-45% improved since beginning therapy. Recommend continued PT for an additional 6 sessions to help progress her remaining impairments to allow the Pt to safely return to her PLOF and work duties with less pain or risk of further injury.   Thank you for this referral.     Progress towards goals / Updated goals:   Short Term Goals: To be accomplished in 2 treatments:  1.) The patient will be independent with their HEP consistently for at least one week- met  Long Term Goals: To be accomplished in 16 treatments:  1.) The patient will have at most 2/10 pain with daily activities- progressing  2.) The patient will be able to ambulate for at least 30min without an AD or having to change positions due to pain- met  3.) The patient will improve their FOTO score from 56 to at least 65 to show improvements in functional mobility- progressing (61/100)   4.) The patient will be able to comfortably get down to and up from the ground with minimal support in order to comfortably return to work- progressing    Jerrod Marion, PT 9/24/2020     ________________________________________________________________________  NOTE TO PHYSICIAN:  Please complete the following and fax to: [facility]: [phone]  . Retain this original for your records. If you are unable to process this request in 24 hours, please contact our office.        ____ I have read the above report and request that my patient continue therapy with the following changes/special instructions:  ____ I have read the above report and request that my patient be discharged from therapy    Physician's Signature:_________________ Date:___________Time:__________

## 2020-09-28 ENCOUNTER — HOSPITAL ENCOUNTER (OUTPATIENT)
Dept: PHYSICAL THERAPY | Age: 60
Discharge: HOME | End: 2020-09-28
Payer: COMMERCIAL

## 2020-09-28 PROCEDURE — 97110 THERAPEUTIC EXERCISES: CPT

## 2020-09-28 NOTE — PROGRESS NOTES
PT DAILY TREATMENT NOTE 2-15    Patient Name: Mona Espino  Date:2020  : 1960  [x]  Patient  Verified  Payor: Ahmet Reynoso / Plan: Eddie Schilling HMO / Product Type: HMO /    In time: 10:35 AM  Out time: 11:33 AM  Total Treatment Time (min): 58 minutes  Visit #: 18     Treatment Area: Presence of right artificial hip joint [Z96.641]    SUBJECTIVE  Pain Level (0-10 scale): 1/10  Any medication changes, allergies to medications, adverse drug reactions, diagnosis change, or new procedure performed?: [x] No    [] Yes (see summary sheet for update)  Subjective functional status/changes:   [] No changes reported  The Pt reports that she is feeling good. She was less sore after her last session. She is having to get a defibrillator on 10/14/20. OBJECTIVE    58 min Therapeutic Exercise:  [x] See flow sheet :   Rationale: increase ROM, increase strength, improve coordination, improve balance and increase proprioception to improve the patients ability to perform ADLs and work duties with less pain or discomfort. With   [x] TE   [] TA   [] neuro   [] other: Patient Education: [x] Review HEP    [] Progressed/Changed HEP based on:   [] positioning   [] body mechanics   [] transfers   [] heat/ice application    [] other:      Other Objective/Functional Measures: None noted     Pain Level (0-10 scale) post treatment: 2/10    ASSESSMENT/Changes in Function:   The Pt tolerated her therapy program well. Today, she was able to lunge lower with the surgical side and there was no pain, but fatigue. She had to rest several times due to fatigue and high HR. She is progressing nicely with her strengthening exercises.   Patient will continue to benefit from skilled PT services to modify and progress therapeutic interventions, address functional mobility deficits, address ROM deficits, address strength deficits, analyze and address soft tissue restrictions, analyze and cue movement patterns, analyze and modify body mechanics/ergonomics, assess and modify postural abnormalities and instruct in home and community integration to attain remaining goals.      []  See Plan of Care  []  See progress note/recertification  []  See Discharge Summary         Progress towards goals / Updated goals:  Short Term Goals: To be accomplished in 2 treatments:  1.) The patient will be independent with their HEP consistently for at least one week- met  Long Term Goals: To be accomplished in 16 treatments:  1.) The patient will have at most 2/10 pain with daily activities- progressing  2.) The patient will be able to ambulate for at least 30min without an AD or having to change positions due to pain- met  3.) The patient will improve their FOTO score from 56 to at least 65 to show improvements in functional mobility- progressing (61/100)   4.) The patient will be able to comfortably get down to and up from the ground with minimal support in order to comfortably return to work- progressing    PLAN  [x]  Upgrade activities as tolerated     [x]  Continue plan of care  [x]  Update interventions per flow sheet       []  Discharge due to:_  []  Other:_      Marco Antonio Jarrett, PT 9/28/2020

## 2020-09-29 ENCOUNTER — OFFICE VISIT (OUTPATIENT)
Dept: ORTHOPEDIC SURGERY | Age: 60
End: 2020-09-29
Payer: COMMERCIAL

## 2020-09-29 VITALS
HEIGHT: 67 IN | SYSTOLIC BLOOD PRESSURE: 104 MMHG | BODY MASS INDEX: 33.43 KG/M2 | OXYGEN SATURATION: 98 % | HEART RATE: 92 BPM | WEIGHT: 213 LBS | TEMPERATURE: 97.3 F | DIASTOLIC BLOOD PRESSURE: 68 MMHG

## 2020-09-29 DIAGNOSIS — Z96.641 S/P HIP REPLACEMENT, RIGHT: Primary | ICD-10-CM

## 2020-09-29 PROCEDURE — 99024 POSTOP FOLLOW-UP VISIT: CPT | Performed by: ORTHOPAEDIC SURGERY

## 2020-09-29 RX ORDER — LEVOTHYROXINE SODIUM 137 UG/1
TABLET ORAL
COMMUNITY
Start: 2020-09-11

## 2020-09-29 NOTE — PROGRESS NOTES
Identified pt with two pt identifiers (name and ). Reviewed chart in preparation for visit and have obtained necessary documentation. Brisa Morrison is a 61 y.o. female  Chief Complaint   Patient presents with    Surgical Follow-up     RT hip     Visit Vitals  /68 (BP 1 Location: Right arm, BP Patient Position: Sitting)   Pulse 92   Temp 97.3 °F (36.3 °C) (Tympanic)   Ht 5' 7\" (1.702 m)   Wt 213 lb (96.6 kg)   SpO2 98%   BMI 33.36 kg/m²     1. Have you been to the ER, urgent care clinic since your last visit? Hospitalized since your last visit? No    2. Have you seen or consulted any other health care providers outside of the [address] [place] [address] since your last visit? Include any pap smears or colon screening.  No

## 2020-09-29 NOTE — PROGRESS NOTES
is here for a follow up visit from a total hip arthroplasty on the right side. The patient is doing well, with no medical complications since discharge. Pain has been appropriate since surgery,and the patient is progressing well with physical therapy. She is concerned over her physical job and does not feel ready to return at this point. Current Outpatient Medications on File Prior to Visit   Medication Sig Dispense Refill    LevoxyL 137 mcg tablet       meloxicam (MOBIC) 15 mg tablet Take 1 Tab by mouth daily. 60 Tab 1    senna-docusate (PERICOLACE) 8.6-50 mg per tablet Take 1 Tab by mouth daily. 30 Tab 0    doxycycline (MONODOX) 100 mg capsule Take 200 mg by mouth two (2) times a day.  B.infantis-B.ani-B.long-B.bifi (Probiotic 4X) 10-15 mg TbEC Take 10-15 mg by mouth daily.  omega 3-dha-epa-fish oil (Fish Oil) 100-160-1,000 mg cap Take 1,000 mg by mouth daily. take 1 tablet daily      calcium carbonate (CALCIUM 600 PO) Take 1,000 mg by mouth daily.  fexofenadine-pseudoephedrine (Allegra-D 12 Hour)  mg Tb12 Take 1 Tab by mouth every twelve (12) hours.  OTHER Occuvite   Ruten-gums      furosemide (LASIX) 40 mg tablet TAKE 1 TABLET BY MOUTH EVERY DAY      metoprolol succinate (TOPROL-XL) 25 mg XL tablet TAKE 1 TABLET BY MOUTH EVERY DAY      Entresto 24-26 mg tablet TAKE 1 TABLET BY MOUTH TWICE A DAY      [] oxyCODONE IR (ROXICODONE) 5 mg immediate release tablet Take 1-2 Tabs by mouth every four (4) hours as needed for Pain for up to 14 days. Max Daily Amount: 60 mg. 60 Tab 0    Levothyroxine 150 mcg cap Take 150 mcg by mouth Daily (before breakfast). No current facility-administered medications on file prior to visit. ROS:  General: denies agitation, major chest pain, unexpected weakness  Hip pain is managed with tylenol and occasional oxycodone. Skin: healing wound is without issue.    Strength: appropriate weakness of involved extremity is resolving since surgery      Physical Examination:    Blood pressure 104/68, pulse 92, temperature 97.3 °F (36.3 °C), temperature source Tympanic, height 5' 7\" (1.702 m), weight 213 lb (96.6 kg), SpO2 98 %. Skin: no significant drainage, no wound dehiscence. Sensation intact to light touch at level of wound and distally. Lateral femoral cutaneous nerve function is intact with slight ely-incisional numbness. Strength is 5/5 with hip flexion and abduction  Leg lengths are clinically equal  Distal swelling is noted, but appropriate for postoperative course  Distal capillary refill less than 2 seconds      Imaging:    Postoperative xrays are reviewed, they indicate a right total hip arthroplasty in excellent position without evidence of loosening or failure. Leg lengths are equal through the hip.       Assessment: Status post right total hip arthroplasty    Plan:  Patient will continue physical therapy, with the goal of outpatient therapy and then home exercise program as soon as is possible  Wound care and dental prophylaxis instructions were reviewed  Continue to weight bear as tolerated without restriction, except to keep to the positions of comfort  Deep Venous Thrombosis Prophylaxis: none    Follow up will be at 4 weeks from now,  no xrays on follow up

## 2020-10-05 ENCOUNTER — HOSPITAL ENCOUNTER (OUTPATIENT)
Dept: PHYSICAL THERAPY | Age: 60
Discharge: HOME | End: 2020-10-05
Payer: COMMERCIAL

## 2020-10-05 PROCEDURE — 97110 THERAPEUTIC EXERCISES: CPT

## 2020-10-05 PROCEDURE — 97530 THERAPEUTIC ACTIVITIES: CPT

## 2020-10-05 NOTE — PROGRESS NOTES
PT DAILY TREATMENT NOTE 2-15    Patient Name: Shreya Brannon  Date:10/5/2020  : 1960  [x]  Patient  Verified  Payor: Sonja Esparza / Plan: Susanne Chen HMO / Product Type: HMO /    In time: 9:45 AM  Out time: 10:53 AM  Total Treatment Time (min): 68 minutes  Visit #: 19     Treatment Area: Presence of right artificial hip joint [Z96.641]    SUBJECTIVE  Pain Level (0-10 scale): 3/10  Any medication changes, allergies to medications, adverse drug reactions, diagnosis change, or new procedure performed?: [x] No    [] Yes (see summary sheet for update)  Subjective functional status/changes:   [] No changes reported  The Pt reports that her f/u went well with Dr. Pablito Cleveland and per Pt report he thinks that she will be able to return to work, but she is worried about getting up and down from the floor independently. She lifted 30 lb cat food from the bottom shelf into her cart and then into the back of her cart; she thinks she strained her groin just a little so it is more sore. OBJECTIVE    56 min Therapeutic Exercise:  [x] See flow sheet :   Rationale: increase ROM, increase strength, improve coordination, improve balance and increase proprioception to improve the patients ability to ambulate and perform ADLs with less pain or discomfort. 12 min Therapeutic Activity:  [x]  See flow sheet : practiced transfers from standing to kneeling independently using a table for support   Rationale: increase ROM, increase strength, improve coordination, improve balance and increase proprioception  to improve the patients ability to perform work duties with less pain or discomfort.      With   [x] TE   [] TA   [] neuro   [] other: Patient Education: [x] Review HEP    [] Progressed/Changed HEP based on:   [] positioning   [] body mechanics   [] transfers   [] heat/ice application    [] other:      Other Objective/Functional Measures: None noted     Pain Level (0-10 scale) post treatment: 3/10    ASSESSMENT/Changes in Function: Today, worked on transfers from the floor to standing and vice versa. She did this well, but did require a table to push thru her UEs to get to standing. She did not have any hip pain with these transfers, but it did bother her lower back. She had difficulty staying fully erect in tall kneeling due to increased anterior hip tightness. Patient will continue to benefit from skilled PT services to modify and progress therapeutic interventions, address functional mobility deficits, address ROM deficits, address strength deficits, analyze and address soft tissue restrictions, analyze and cue movement patterns, analyze and modify body mechanics/ergonomics, assess and modify postural abnormalities and instruct in home and community integration to attain remaining goals.      []  See Plan of Care  []  See progress note/recertification  []  See Discharge Summary         Progress towards goals / Updated goals:  Short Term Goals: To be accomplished in 2 treatments:  1.) The patient will be independent with their HEP consistently for at least one week- met  Long Term Goals: To be accomplished in 16 treatments:  1.) The patient will have at most 2/10 pain with daily activities- progressing  2.) The patient will be able to ambulate for at least 30min without an AD or having to change positions due to pain- met  3.) The patient will improve their FOTO score from 56 to at least 65 to show improvements in functional mobility- progressing (61/100)   4.) The patient will be able to comfortably get down to and up from the ground with minimal support in order to comfortably return to work- progressing    PLAN  [x]  Upgrade activities as tolerated     [x]  Continue plan of care  [x]  Update interventions per flow sheet       []  Discharge due to:_  []  Other:_      Sammie Arellano, PT 10/5/2020

## 2020-10-10 ENCOUNTER — TRANSCRIBE ORDER (OUTPATIENT)
Dept: REGISTRATION | Age: 60
End: 2020-10-10

## 2020-10-10 ENCOUNTER — HOSPITAL ENCOUNTER (OUTPATIENT)
Dept: PREADMISSION TESTING | Age: 60
Discharge: HOME | End: 2020-10-10
Payer: COMMERCIAL

## 2020-10-10 DIAGNOSIS — Z01.812 PRE-PROCEDURE LAB EXAM: Primary | ICD-10-CM

## 2020-10-10 DIAGNOSIS — Z01.812 PRE-PROCEDURE LAB EXAM: ICD-10-CM

## 2020-10-10 PROCEDURE — 87635 SARS-COV-2 COVID-19 AMP PRB: CPT

## 2020-10-11 LAB — SARS-COV-2, COV2NT: NOT DETECTED

## 2020-10-12 ENCOUNTER — HOSPITAL ENCOUNTER (OUTPATIENT)
Dept: PHYSICAL THERAPY | Age: 60
Discharge: HOME | End: 2020-10-12
Payer: COMMERCIAL

## 2020-10-12 PROCEDURE — 97110 THERAPEUTIC EXERCISES: CPT

## 2020-10-12 NOTE — PROGRESS NOTES
PT DAILY TREATMENT NOTE 2-15    Patient Name: Montana More  Date:10/12/2020  : 1960  [x]  Patient  Verified  Payor: Veronica Shields / Plan: Jono Paul PPO / Product Type: PPO /    In time: 9:45 AM  Out time: 10:40 AM  Total Treatment Time (min): 55 minutes  Visit #: 20  Treatment Area: Presence of right artificial hip joint [Z96.641]    SUBJECTIVE  Pain Level (0-10 scale): 1/10  Any medication changes, allergies to medications, adverse drug reactions, diagnosis change, or new procedure performed?: [x] No    [] Yes (see summary sheet for update)  Subjective functional status/changes:   [] No changes reported  The Pt reports that she is feeling more stiffness than pain this morning. She reports that because of the weather she was unable to get outside and walk and can tell that she is stiff. She is scheduled to have her procedure on Wednesday. OBJECTIVE    55 min Therapeutic Exercise:  [x] See flow sheet :   Rationale: increase ROM, increase strength, improve coordination, improve balance and increase proprioception to improve the patients ability to perform ADLs and work duties with less pain or difficulty    With   [x] TE   [] TA   [] neuro   [] other: Patient Education: [x] Review HEP    [] Progressed/Changed HEP based on:   [] positioning   [] body mechanics   [] transfers   [] heat/ice application    [] other:      Other Objective/Functional Measures: None noted     Pain Level (0-10 scale) post treatment: 1/10    ASSESSMENT/Changes in Function:   The Pt was able to do 5 min on the elliptical without any hip pain or discomfort; she had difficulty due to her HR increasing quickly and having difficulty stabilizing. Did not perform many of the challenging exercises today due to the Pt having surgery on Wednesday because do not want her to be too sore or tired before the surgery. We will progress her after her surgery as tolerated.   Patient will continue to benefit from skilled PT services to modify and progress therapeutic interventions, address functional mobility deficits, address ROM deficits, address strength deficits, analyze and address soft tissue restrictions, analyze and cue movement patterns, analyze and modify body mechanics/ergonomics, assess and modify postural abnormalities and instruct in home and community integration to attain remaining goals.      []  See Plan of Care  []  See progress note/recertification  []  See Discharge Summary         Progress towards goals / Updated goals:  Short Term Goals: To be accomplished in 2 treatments:  1.) The patient will be independent with their HEP consistently for at least one week- met  Long Term Goals: To be accomplished in 16 treatments:  1.) The patient will have at most 2/10 pain with daily activities- progressing  2.) The patient will be able to ambulate for at least 30min without an AD or having to change positions due to pain- met  3.) The patient will improve their FOTO score from 56 to at least 65 to show improvements in functional mobility- progressing (61/100)   4.) The patient will be able to comfortably get down to and up from the ground with minimal support in order to comfortably return to work- progressing    PLAN  [x]  Upgrade activities as tolerated     [x]  Continue plan of care  [x]  Update interventions per flow sheet       []  Discharge due to:_  []  Other:_      Mercie Snellen, PT 10/12/2020

## 2020-10-14 ENCOUNTER — APPOINTMENT (OUTPATIENT)
Dept: GENERAL RADIOLOGY | Age: 60
End: 2020-10-14
Attending: INTERNAL MEDICINE
Payer: COMMERCIAL

## 2020-10-14 ENCOUNTER — HOSPITAL ENCOUNTER (OUTPATIENT)
Age: 60
Setting detail: OUTPATIENT SURGERY
Discharge: HOME | End: 2020-10-14
Attending: INTERNAL MEDICINE | Admitting: INTERNAL MEDICINE
Payer: COMMERCIAL

## 2020-10-14 VITALS
TEMPERATURE: 98.5 F | OXYGEN SATURATION: 98 % | HEIGHT: 67 IN | BODY MASS INDEX: 33.12 KG/M2 | HEART RATE: 72 BPM | WEIGHT: 211 LBS | SYSTOLIC BLOOD PRESSURE: 97 MMHG | RESPIRATION RATE: 19 BRPM | DIASTOLIC BLOOD PRESSURE: 49 MMHG

## 2020-10-14 DIAGNOSIS — I42.8 CARDIOMYOPATHY, NONCORONARY (HCC): ICD-10-CM

## 2020-10-14 LAB
ATRIAL RATE: 71 BPM
CALCULATED P AXIS, ECG09: 65 DEGREES
CALCULATED R AXIS, ECG10: 115 DEGREES
CALCULATED T AXIS, ECG11: 116 DEGREES
DIAGNOSIS, 93000: NORMAL
P-R INTERVAL, ECG05: 192 MS
Q-T INTERVAL, ECG07: 452 MS
QRS DURATION, ECG06: 110 MS
QTC CALCULATION (BEZET), ECG08: 491 MS
VENTRICULAR RATE, ECG03: 71 BPM

## 2020-10-14 PROCEDURE — 74011000250 HC RX REV CODE- 250: Performed by: INTERNAL MEDICINE

## 2020-10-14 PROCEDURE — 71045 X-RAY EXAM CHEST 1 VIEW: CPT

## 2020-10-14 PROCEDURE — C1777 LEAD, AICD, ENDO SINGLE COIL: HCPCS | Performed by: INTERNAL MEDICINE

## 2020-10-14 PROCEDURE — 74011250636 HC RX REV CODE- 250/636: Performed by: INTERNAL MEDICINE

## 2020-10-14 PROCEDURE — 33249 INSJ/RPLCMT DEFIB W/LEAD(S): CPT | Performed by: INTERNAL MEDICINE

## 2020-10-14 PROCEDURE — C1893 INTRO/SHEATH, FIXED,NON-PEEL: HCPCS | Performed by: INTERNAL MEDICINE

## 2020-10-14 PROCEDURE — 77030031139 HC SUT VCRL2 J&J -A: Performed by: INTERNAL MEDICINE

## 2020-10-14 PROCEDURE — 77030028700 HC BLD TISS PLSM MEDT -E: Performed by: INTERNAL MEDICINE

## 2020-10-14 PROCEDURE — 93642 EP EVL 1/2CHMB TRNSVNS CVDFB: CPT | Performed by: INTERNAL MEDICINE

## 2020-10-14 PROCEDURE — 99152 MOD SED SAME PHYS/QHP 5/>YRS: CPT | Performed by: INTERNAL MEDICINE

## 2020-10-14 PROCEDURE — 77030002996 HC SUT SLK J&J -A: Performed by: INTERNAL MEDICINE

## 2020-10-14 PROCEDURE — 77030022704 HC SUT VLOC COVD -B: Performed by: INTERNAL MEDICINE

## 2020-10-14 PROCEDURE — 93005 ELECTROCARDIOGRAM TRACING: CPT

## 2020-10-14 PROCEDURE — 99153 MOD SED SAME PHYS/QHP EA: CPT | Performed by: INTERNAL MEDICINE

## 2020-10-14 PROCEDURE — 77030039046 HC PAD DEFIB RADIOTRNSPNT CNMD -B: Performed by: INTERNAL MEDICINE

## 2020-10-14 PROCEDURE — 77030010507 HC ADH SKN DERMBND J&J -B: Performed by: INTERNAL MEDICINE

## 2020-10-14 PROCEDURE — C1722 AICD, SINGLE CHAMBER: HCPCS | Performed by: INTERNAL MEDICINE

## 2020-10-14 PROCEDURE — 77030040375: Performed by: INTERNAL MEDICINE

## 2020-10-14 DEVICE — IMPLANTABLE CARDIOVERTER DEFIBRILLATOR VR
Type: IMPLANTABLE DEVICE | Status: FUNCTIONAL
Brand: VIGILANT™ EL ICD VR

## 2020-10-14 DEVICE — INTEGRATED BIPOLAR PACE/SENSE AND DEFIBRILLATION LEAD
Type: IMPLANTABLE DEVICE | Status: FUNCTIONAL
Brand: ENDOTAK RELIANCE® S

## 2020-10-14 RX ORDER — CEFAZOLIN SODIUM/WATER 2 G/20 ML
2 SYRINGE (ML) INTRAVENOUS ONCE
Status: COMPLETED | OUTPATIENT
Start: 2020-10-14 | End: 2020-10-14

## 2020-10-14 RX ORDER — FENTANYL CITRATE 50 UG/ML
INJECTION, SOLUTION INTRAMUSCULAR; INTRAVENOUS AS NEEDED
Status: DISCONTINUED | OUTPATIENT
Start: 2020-10-14 | End: 2020-10-14 | Stop reason: HOSPADM

## 2020-10-14 RX ORDER — SODIUM CHLORIDE 0.9 % (FLUSH) 0.9 %
5-40 SYRINGE (ML) INJECTION EVERY 8 HOURS
Status: DISCONTINUED | OUTPATIENT
Start: 2020-10-14 | End: 2020-10-14 | Stop reason: HOSPADM

## 2020-10-14 RX ORDER — MIDAZOLAM HYDROCHLORIDE 1 MG/ML
INJECTION, SOLUTION INTRAMUSCULAR; INTRAVENOUS AS NEEDED
Status: DISCONTINUED | OUTPATIENT
Start: 2020-10-14 | End: 2020-10-14 | Stop reason: HOSPADM

## 2020-10-14 RX ORDER — HYDROCODONE BITARTRATE AND ACETAMINOPHEN 5; 325 MG/1; MG/1
1 TABLET ORAL
Status: DISCONTINUED | OUTPATIENT
Start: 2020-10-14 | End: 2020-10-14 | Stop reason: HOSPADM

## 2020-10-14 RX ORDER — SODIUM CHLORIDE 0.9 % (FLUSH) 0.9 %
5-40 SYRINGE (ML) INJECTION AS NEEDED
Status: DISCONTINUED | OUTPATIENT
Start: 2020-10-14 | End: 2020-10-14 | Stop reason: HOSPADM

## 2020-10-14 RX ORDER — LIDOCAINE HYDROCHLORIDE 10 MG/ML
INJECTION INFILTRATION; PERINEURAL AS NEEDED
Status: DISCONTINUED | OUTPATIENT
Start: 2020-10-14 | End: 2020-10-14 | Stop reason: HOSPADM

## 2020-10-14 NOTE — PROCEDURES
PROCEDURE:  ICD IMPLANT  10/14/2020    Indication: Cardiomyopathy LVEF 25%. ICD for primary prevention    PROCEDURES PERFORMED:  1. Conscious sedation. 2. Fluoroscopy for lead placement. 3. Permanent Implantable Cardioverter Defibrillator (ICD) Implantation:      A: [employer] ICD model  D232 Vigilant       B: Placement of ventricular lead with threshold testing. Lead: BS 0272         4. ICD defribrillation threshold testing by the step down method    COMPLICATIONS:None. ESTIMATED BLOOD LOSS: Minimal  SPECIMENS: None  ASSISTANTS: See Benny    PROCEDURAL NOTE:  The patient was brought to the laboratory in a sedated postabsorptive state. The left chest wall was prepped and draped in the usual fashion and anesthetized with 20 mL of 2% lidocaine. Incision was made over the deltopectoral groove and extended to the level of the pectoralis fascia. A pocket was made anterior to the pectoralis fascia for in which to implant the device. The left cephalic vein was ligated with one 0- silk tie. A venotomy was created followed by the passing of a guide wire and tear away sheath. The ventricular lead was inserted and advanced to the right ventricular apex. Threshold testing was performed. Once adequate position was obtained the peel-away sheath was removed   The lead was secured to the pectoralis muscle utilizing its protective sleeve with #2-0 silk ties around the lead. The ICD pocket was flushed with antibiotic containing sterile saline  solution. The lead was attached to generator. Lead and generator placed in the pocket with the leads posterior to the generator. Subcutaneous tissue closed in 2 layers utilizing #2-0 Vicryl. Skin closed with dermabond glue with an excellent final result. Defibrillation threshold test was performed by the step down method with a threshold of 14 J. The device was programed to > 2 times the defibrillation threshold.    The patient was transferred to the [facility] area    No complications were noted. ICD LEAD THRESHOLD TESTIN. Ventricular R-wave: 8.3  millivolts; Ventricular capture threshold ( 0.4 milliseconds pulse width): Voltage 0.8  volts, impedance 886 ohms, no diaphragmatic stimulation of 10 volt output.

## 2020-10-14 NOTE — Clinical Note
A Bovie was used. Blend setting: low. Mode: monopolar. Coagulation Settin. Cut Settin. Site (pad location): lateral thigh. Laterality: left.

## 2020-10-14 NOTE — Clinical Note
Sheath #1: Dressed using bacteriostatic and topical skin adhesive dressing. Site: clean, dry, & intact, no bleeding and no hematoma.

## 2020-10-14 NOTE — PROGRESS NOTES
TRANSFER - OUT REPORT:    Verbal report given to LYNNETTE Marti on Mitchel Barnes being transferred to cath lab recovery for routine progression of care       Report consisted of patients Situation, Background, Assessment and   Recommendations(SBAR). Information from the following report(s) Procedure Summary was reviewed with the receiving nurse. Opportunity for questions and clarification was provided. Cardiac Cath Lab Procedure Area Arrival Note:    Mitchel Barnes arrived to Cardiac Cath Lab, Procedure Area. Patient identifiers verified with NAME and DATE OF BIRTH. Procedure verified with patient. Consent forms verified. Allergies verified. Patient informed of procedure and plan of care. Questions answered with review. Patient voiced understanding of procedure and plan of care. Patient on cardiac monitor, non-invasive blood pressure, SPO2 monitor. On  or O2 @ 2 lpm via nasal canula. IV of 0.9% NS on pump at 25 ml/hr. Patient status doing well without problems. Patient is A&Ox nasal ccanula. Patient reports no p[ain. Patient medicated during procedure with orders obtained and verified by Dr. Rosana Rodríguez. Refer to patients Cardiac Cath Lab PROCEDURE REPORT for vital signs, assessment, status, and response during procedure, printed at end of case. Printed report on chart or scanned into chart.

## 2020-10-14 NOTE — Clinical Note
Left chest prepped with ChloraPrep Wet prep solution applied at: 828. Wet prep solution dried at: 831. Wet prep elapsed drying time: 3 mins.

## 2020-10-14 NOTE — Clinical Note
Left chest prepped with ChloraPrep and draped. Wet prep solution applied at: 830. Wet prep solution dried at: 833. Wet prep elapsed drying time: 3 mins.

## 2020-10-14 NOTE — PROGRESS NOTES
Guanako Garcia ambulated @ 36 (time) approximately 50 feet. Patient tolerated ambulation without adverse advents. Left Subclavian (right/left, groin/arm)  without bleeding or hematoma noted.

## 2020-10-14 NOTE — DISCHARGE INSTRUCTIONS
Pacemaker and ICD incision care and Discharge Instructions    Limitations and Precautions    Do not lift the affected arm over your head for the next 2 weeks. If you are given a sling, only use it for 2-3 days. The sling is just a comfort measure and a remind to not lift your arm. Do not lift anything heavy for 2 weeks. Restriction of 10 lbs total weight. For example 1 six pack of 12 oz soda weighs 4 lbs. 1 gallon of milk weighs 8.5 lbs. Some swelling, redness, and pain are common with all incisions and normally will go away as the incision heals. If swelling, redness, or pain increases or if the area feels warm to touch contact a doctor. Also contact a doctor if the incision edges reopen or separate, if any drainage is noted or if you have a high fever , > 101 degrees. Caring for your Incision    · Surgical Glue was used over your incision  1. If a small dressing is over your incision, you may remove it one day after your procedure. 2. Surgical glue has been applied directly on the incision. This will be shiny in appearance. 3. 24 hours after your surgery, you may briefly wet your incision in the shower or bath. Do not soak or scrub your incision. After showering or bathing, gently blot your wound dry with a soft towel. 4. Do not scratch, rub, or pick at the adhesive film. This may loosen the film before your incision has healed. 5. Protect the incision from prolonged exposure to sunlight or tanning lamps while the film in place. 6. Do not apply liquid or ointment medications or any other product to your incision while the adhesive film is in place. These may loosen the film before your wound is healed. 7. The adhesive film will start to flake off in a week or two. Information about your Pacemaker/ICD    Use with Caution:  · You may safely use a cell phone on the side opposite from your device. · Keep a cell phone 6 inches from your device.   · Keep any small electrical devices such as an I-pod 6 inches from your device  · You CAN  HAVE A MRI SCAN  · Do not use a TENS unit or magnetic therapy application near your device. Check with your nurse to be approved. · Use of lawn equipment or power tools; Keep all equipment at arms length. Other Information:  · Keep your ID card with you at all times. · You may use a microwave oven and any other household appliances. · You may walk through a metal detector. It will not harm your device; however, it may cause the security arm to sound  · You may have an X-ray or CAT Scan.  · If you undergo any type of procedure or surgery, notify the physician of your device. It may need to be adjusted prior to the procedure.   · We recommend purchasing a Medic Alert bracelet or necklace    If you have further questions you may call your physician's office

## 2020-10-14 NOTE — PROGRESS NOTES
TRANSFER - IN REPORT:    Verbal report received from Tayler Banks on Kobe Palencia, Procedure ICD , from the Cardiac Cath lab, for routine progression of care. Report consisted of patients Situation, Background, Assessment and Recommendations(SBAR). Information from the following report(s) Procedure Summary, MAR and Recent Results was reviewed with the receiving clinician. Opportunity for questions and clarification was provided. Assessment completed upon patients arrival to [address] [place] [address] and care assumed. Cardiac Cath Lab Recovery Arrival Note:     Kobe Palencia arrived to [facility] recovery area. Patient procedure= ICD. Patient on cardiac monitor, non-invasive blood pressure, Patient status doing well without problems. Patient is A&Ox 4. Patient reports no pain, no chest pain, no n/v. Procedure site without any bleeding and no hematoma.

## 2020-10-14 NOTE — DISCHARGE SUMMARY
CARDIOLOGY SHORT STAY D/C    Patient stable after  icd implant    Visit Vitals  BP (!) 105/59 (BP 1 Location: Right arm, BP Patient Position: At rest)   Pulse 75   Temp 98.5 °F (36.9 °C)   Resp 20   Ht 5' 7\" (1.702 m)   Wt 95.7 kg (211 lb)   SpO2 98%   Breastfeeding No   BMI 33.05 kg/m²     Cardiovascular: regular rate and rhythm, no edema, no murmurs  Respiratory: clear to ascultation bilaterally  Musculoskeletal: Incision ok, groin sites ok  Interrogation: Ok  Tele: NSR     PLAN    1) Discharge to home in stable condition    2)  Followup as below:       3) Discharge meds as below:    Current Discharge Medication List      CONTINUE these medications which have NOT CHANGED    Details   LevoxyL 137 mcg tablet       doxycycline (MONODOX) 100 mg capsule Take 200 mg by mouth two (2) times a day. B.infantis-B.ani-B.long-B.bifi (Probiotic 4X) 10-15 mg TbEC Take 10-15 mg by mouth daily. omega 3-dha-epa-fish oil (Fish Oil) 100-160-1,000 mg cap Take 1,000 mg by mouth daily. take 1 tablet daily      calcium carbonate (CALCIUM 600 PO) Take 1,000 mg by mouth daily. fexofenadine-pseudoephedrine (Allegra-D 12 Hour)  mg Tb12 Take 1 Tab by mouth every twelve (12) hours. OTHER Occuvite   Ruten-gums      furosemide (LASIX) 40 mg tablet TAKE 1 TABLET BY MOUTH EVERY DAY      metoprolol succinate (TOPROL-XL) 25 mg XL tablet TAKE 1 TABLET BY MOUTH EVERY DAY      Entresto 24-26 mg tablet TAKE 1 TABLET BY MOUTH TWICE A DAY      meloxicam (MOBIC) 15 mg tablet Take 1 Tab by mouth daily. Qty: 60 Tab, Refills: 1    Associated Diagnoses: Bilateral primary osteoarthritis of hip      senna-docusate (PERICOLACE) 8.6-50 mg per tablet Take 1 Tab by mouth daily.   Qty: 30 Tab, Refills: 0

## 2020-10-14 NOTE — PROGRESS NOTES
I have reviewed discharge instructions with the patient. The patient verbalized understanding. Copy of discharge instructions given to patient. Patient's Niece: Eladia Lepe, is present to pick the patient up. Left arm sling applied, ice pack given, home monitoring device.

## 2020-10-19 ENCOUNTER — APPOINTMENT (OUTPATIENT)
Dept: PHYSICAL THERAPY | Age: 60
End: 2020-10-19
Payer: COMMERCIAL

## 2020-10-26 ENCOUNTER — APPOINTMENT (OUTPATIENT)
Dept: PHYSICAL THERAPY | Age: 60
End: 2020-10-26
Payer: COMMERCIAL

## 2020-10-28 ENCOUNTER — OFFICE VISIT (OUTPATIENT)
Dept: ORTHOPEDIC SURGERY | Age: 60
End: 2020-10-28
Payer: COMMERCIAL

## 2020-10-28 VITALS
SYSTOLIC BLOOD PRESSURE: 114 MMHG | HEART RATE: 93 BPM | TEMPERATURE: 97.7 F | OXYGEN SATURATION: 99 % | HEIGHT: 67 IN | DIASTOLIC BLOOD PRESSURE: 70 MMHG | WEIGHT: 216 LBS | BODY MASS INDEX: 33.9 KG/M2

## 2020-10-28 DIAGNOSIS — Z96.641 S/P HIP REPLACEMENT, RIGHT: Primary | ICD-10-CM

## 2020-10-28 PROCEDURE — 99212 OFFICE O/P EST SF 10 MIN: CPT | Performed by: ORTHOPAEDIC SURGERY

## 2020-10-28 NOTE — PROGRESS NOTES
Identified pt with two pt identifiers (name and ). Reviewed chart in preparation for visit and have obtained necessary documentation. Krystin Holcomb is a 61 y.o. female  Chief Complaint   Patient presents with    Surgical Follow-up     RT hip     Visit Vitals  /70 (BP 1 Location: Right arm, BP Patient Position: Sitting)   Pulse 93   Temp 97.7 °F (36.5 °C) (Tympanic)   Ht 5' 7\" (1.702 m)   Wt 216 lb (98 kg)   SpO2 99%   BMI 33.83 kg/m²     1. Have you been to the ER, urgent care clinic since your last visit? Hospitalized since your last visit? No    2. Have you seen or consulted any other health care providers outside of the [address] [place] [address] since your last visit? Include any pap smears or colon screening.  No

## 2020-10-28 NOTE — PROGRESS NOTES
10/28/2020    Chief Complaint: Follow up for right hip replacement    HPI:  is a 61 y.o.  who is now 3.5 months status post right hip  arthroplasty. The patient states that they are doing well. The preoperative pain is gone and the postoperative pain is minimal.  Regular exercises have helped with residual symptoms. Past Medical History:   Diagnosis Date    Arthritis     Cardiomyopathy [facility])     cardiologist-Dr. Gloria Caballero     Heart failure ([address]     acute systolic heart failure    Thyroid disease        Past Surgical History:   Procedure Laterality Date    HX APPENDECTOMY      HX GYN      dysplasia    HX HIP REPLACEMENT      MT EPHYS EVAL PACG CVDFB PRGRMG/REPRGRMG PARAMETERS N/A 10/14/2020    Eval Icd Generator & Leads W Testing At Implant performed by Gaye Baker MD at [address] CATH LAB    MT INSJ/RPLCMT PERM DFB W/TRNSVNS LDS 1/DUAL CHMBR N/A 10/14/2020    Insert Icd Single performed by Gaye Baker MD at [address] CATH LAB       Current Outpatient Medications on File Prior to Visit   Medication Sig Dispense Refill    LevoxyL 137 mcg tablet       meloxicam (MOBIC) 15 mg tablet Take 1 Tab by mouth daily. 60 Tab 1    senna-docusate (PERICOLACE) 8.6-50 mg per tablet Take 1 Tab by mouth daily. 30 Tab 0    doxycycline (MONODOX) 100 mg capsule Take 200 mg by mouth two (2) times a day.  B.infantis-B.ani-B.long-B.bifi (Probiotic 4X) 10-15 mg TbEC Take 10-15 mg by mouth daily.  omega 3-dha-epa-fish oil (Fish Oil) 100-160-1,000 mg cap Take 1,000 mg by mouth daily. take 1 tablet daily      calcium carbonate (CALCIUM 600 PO) Take 1,000 mg by mouth daily.  fexofenadine-pseudoephedrine (Allegra-D 12 Hour)  mg Tb12 Take 1 Tab by mouth every twelve (12) hours.       OTHER Occuvite   Ruten-gums      furosemide (LASIX) 40 mg tablet TAKE 1 TABLET BY MOUTH EVERY DAY      metoprolol succinate (TOPROL-XL) 25 mg XL tablet TAKE 1 TABLET BY MOUTH EVERY DAY     [facility] 24-26 mg tablet TAKE 1 TABLET BY MOUTH TWICE A DAY       No current facility-administered medications on file prior to visit. No Known Allergies    Family History   Problem Relation Age of Onset    Heart Disease Mother     Hypertension Mother     Diabetes Brother     Hypertension Brother        Social History     Socioeconomic History    Marital status: SINGLE     Spouse name: Not on file    Number of children: Not on file    Years of education: Not on file    Highest education level: Not on file   Tobacco Use    Smoking status: Former Smoker     Packs/day: 0.50     Years: 30.00     Pack years: 15.00     Last attempt to quit: 2019     Years since quittin.8    Smokeless tobacco: Never Used   Substance and Sexual Activity    Alcohol use: Yes     Alcohol/week: 1.0 standard drinks     Types: 1 Glasses of wine per week     Comment: weekends    Drug use: Yes     Types: Prescription, OTC    Sexual activity: Yes   Other Topics Concern         Review of Systems:       General: Denies headache, lethargy, fever, weight loss  Ears/Nose/Throat: Denies ear discharge, drainage, nosebleeds, hoarse voice, dental problems  Cardiovascular: Denies chest pain, shortness of breath  Lungs: Denies chest pain, breathing problems, wheezing, pneumonia  Stomach: Denies stomach pain, heartburn, constipation, irritable bowel  Skin: Denies rash, sores, open wounds  Musculoskeletal: right hip replacement  Genitourinary: Denies dysuria, hematuria, polyuria  Gastrointestinal: Denies constipation, obstipation, diarrhea  Neurological: Denies changes in sight, smell, hearing, taste, seizures. Denies loss of consciousness.   Psychiatric: Denies depression, sleep pattern changes, anxiety, change in personality  Endocrine: Denies mood swings, heat or cold intolerance  Hematologic/Lymphatic: Denies anemia, purpura, petechia  Allergic/Immunologic: Denies swelling of throat, pain or swelling at lymph nodes      Physical Examination:    Visit Vitals  /70 (BP 1 Location: Right arm, BP Patient Position: Sitting)   Pulse 93   Temp 97.7 °F (36.5 °C) (Tympanic)   Ht 5' 7\" (1.702 m)   Wt 216 lb (98 kg)   SpO2 99%   BMI 33.83 kg/m²        General: AOX3, no apparent distress  Psychiatric: mood and affect appropriate  Lungs: breathing is symmetric and unlabored bilaterally  Heart: regular rate and rhythm  Abdomen: no guarding  Head: normocephalic, atraumatic  Skin: No significant abnormalities, good turgor  Sensation intact to light touch: L1-S1 dermatomes  Muscular exam: 5/5 strength in all major muscle groups unless noted in specialty exam.    Extremities:      Left upper extremity: Full active and passive range of motion without pain, deformity, no open wound, strength 5/5 in all major muscle groups. Right upper extremity: Full active and passive range of motion without pain, deformity, no open wound, strength 5/5 in all major muscle groups. Left lower extremity: Full active and passive range of motion without pain, deformity, no open wound, strength 5/5 in all major muscle groups. Right lower extremity:  Well healed surgical wound is noted. Patient ambulates with no device and no limp. No deformity is noted. Circumduction of the hip does not cause arthritic pain as it had preoperatively. Strength testing is indicative of 4+/5 strength at hip flexion, extension,  5/5 knee flexion and extension, tibialis anterior, EHL, and FHL. Sensation is intact to light touch in the L1-S1 dermatomes with lateral femoral cutaneous nerve function intact. Capillary refill is less than 2 seconds distally. Diagnostics:    Pertinent Xrays:  Xrays are available of the right hip, they indicate a total hip arthroplasty in excellent position without evidence of loosening or failure. Leg lengths are equal.        Assessment: Aftercare, status post right hip arthroplasty    Plan:   will continue physical therapy exercises.  We discussed the importance of continued vigilance to this end. We also discussed dental prophylaxis and safe activities and reasonable life choices regarding activity level. Patient stated their understanding. Pain control for now will be as needed only, and will be patient directed.  will follow up in 4 weeks. no x-rays on follow up. Ms. Stefano Linares has a reminder for a \"due or due soon\" health maintenance. I have asked that she contact her primary care provider for follow-up on this health maintenance.

## 2020-11-02 ENCOUNTER — HOSPITAL ENCOUNTER (OUTPATIENT)
Dept: PHYSICAL THERAPY | Age: 60
Discharge: HOME | End: 2020-11-02
Payer: COMMERCIAL

## 2020-11-02 PROCEDURE — 97110 THERAPEUTIC EXERCISES: CPT

## 2020-11-02 NOTE — PROGRESS NOTES
PT DAILY TREATMENT NOTE 2-15    Patient Name: Krystin Holcomb  Date:2020  : 1960  [x]  Patient  Verified  Payor: [place] Rank / Plan: Yetta Old PPO / Product Type: PPO /    In time: 9:45 AM  Out time: 10:52 AM  Total Treatment Time (min): 67 minutes  Visit #: 21    Treatment Area: Presence of right artificial hip joint [Z96.641]    SUBJECTIVE  Pain Level (0-10 scale): 0/10  Any medication changes, allergies to medications, adverse drug reactions, diagnosis change, or new procedure performed?: [x] No    [] Yes (see summary sheet for update)  Subjective functional status/changes:   [] No changes reported  The Pt had a defibrillator placed on 10/14/2020 and has been healing from that surgery. She is doing well and feeling better. During that time she has continued with her standing hip exercises and reports that the hip feels better overall. She has been able to walk 30 min straight without any hip discomfort. Her current limitation is being able to don/doff socks and shoes independently. OBJECTIVE    67 min Therapeutic Exercise:  [x] See flow sheet :   Rationale: increase ROM, increase strength, improve coordination, improve balance and increase proprioception to improve the patients ability to perform ADLs and work duties with less pain or discomfort. With   [x] TE   [] TA   [] neuro   [] other: Patient Education: [x] Review HEP    [] Progressed/Changed HEP based on:   [] positioning   [] body mechanics   [] transfers   [] heat/ice application    [] other:      Other Objective/Functional Measures: None noted     Pain Level (0-10 scale) post treatment: 2/10 (sore)    ASSESSMENT/Changes in Function:   The Pt was able to perform all of her exercises that did not require any UE involvement well. She noticed increased muscle activation with the higher resistance band, but not significant pain. She is progressing well towards her therapeutic goals.   Patient will continue to benefit from skilled PT services to modify and progress therapeutic interventions, address functional mobility deficits, address ROM deficits, address strength deficits, analyze and address soft tissue restrictions, analyze and cue movement patterns, analyze and modify body mechanics/ergonomics, assess and modify postural abnormalities and instruct in home and community integration to attain remaining goals.      []  See Plan of Care  []  See progress note/recertification  []  See Discharge Summary         Progress towards goals / Updated goals:  Short Term Goals: To be accomplished in 2 treatments:  1.) The patient will be independent with their HEP consistently for at least one week- met  Long Term Goals: To be accomplished in 16 treatments:  1.) The patient will have at most 2/10 pain with daily activities- progressing  2.) The patient will be able to ambulate for at least 30min without an AD or having to change positions due to pain- met  3.) The patient will improve their FOTO score from 56 to at least 65 to show improvements in functional mobility- progressing (61/100)   4.) The patient will be able to comfortably get down to and up from the ground with minimal support in order to comfortably return to work- progressing    PLAN  [x]  Upgrade activities as tolerated     [x]  Continue plan of care  [x]  Update interventions per flow sheet       []  Discharge due to:_  []  Other:_      Fracisco Skinner, PT 11/2/2020

## 2020-11-09 ENCOUNTER — HOSPITAL ENCOUNTER (OUTPATIENT)
Dept: PHYSICAL THERAPY | Age: 60
Discharge: HOME | End: 2020-11-09
Payer: COMMERCIAL

## 2020-11-09 PROCEDURE — 97110 THERAPEUTIC EXERCISES: CPT

## 2020-11-09 NOTE — PROGRESS NOTES
PT DAILY TREATMENT NOTE 2-15    Patient Name: Tomasz Wick  Date:2020  : 1960  [x]  Patient  Verified  Payor: Althea Hamman / Plan: Clair Chaudhary PPO / Product Type: PPO /    In time: 9:45 AM  Out time: 10:55 AM  Total Treatment Time (min): 70 minutes  Visit #: 22     Treatment Area: Presence of right artificial hip joint [Z96.641]    SUBJECTIVE  Pain Level (0-10 scale): 0/10  Any medication changes, allergies to medications, adverse drug reactions, diagnosis change, or new procedure performed?: [x] No    [] Yes (see summary sheet for update)  Subjective functional status/changes:   [] No changes reported  The Pt reports that she was sore with the upgrade to the blue band, but it was not bad. Overall, she is feeling better and is able to now to lift the R LE onto the edge of the tub independently. She continues to have stiffness and tightness that prevent her from being able to cross the R LE to don/doff socks and shoes. Her standing and walking tolerances are not limited by her hip and it is her back pain that cause her to need to rest. She is independent with stairs using a handrail. She is able to get up and down from the floor independently using a chair. Overall, she believes that she is 80% improved since beginning therapy. OBJECTIVE    70 min Therapeutic Exercise:  [x] See flow sheet :   Rationale: increase ROM, increase strength, improve coordination, improve balance and increase proprioception to improve the patients ability to perform ADLs    With   [x] TE   [] TA   [] neuro   [x] other: Patient Education: [x] Review HEP    [] Progressed/Changed HEP based on:   [] positioning   [] body mechanics   [] transfers   [] heat/ice application    [x] other: Pt educated how to safely progress her HEP independently.      Other Objective/Functional Measures:  FOTO 61/100 (56/100 at initial evaluation)     Pain Level (0-10 scale) post treatment: 1/10    ASSESSMENT/Changes in Function:     []  See Plan of Care  []  See progress note/recertification  [x]  See Discharge Summary         Progress towards goals / Updated goals:   Short Term Goals: To be accomplished in 2 treatments:  1.) The patient will be independent with their HEP consistently for at least one week- met  Long Term Goals: To be accomplished in 16 treatments:  1.) The patient will have at most 2/10 pain with daily activities- met  2.) The patient will be able to ambulate for at least 30min without an AD or having to change positions due to pain- met  3.) The patient will improve their FOTO score from 56 to at least 65 to show improvements in functional mobility- progressing FOTO 61/100 at discharge  4.) The patient will be able to comfortably get down to and up from the ground with minimal support in order to comfortably return to work- met    PLAN  []  Upgrade activities as tolerated     []  Continue plan of care  []  Update interventions per flow sheet       [x]  Discharge due to: Pt has met or is progressing well towards therapeutic goals  []  Other:_      Paxton Hannon, PT 11/9/2020

## 2020-11-09 NOTE — ANCILLARY DISCHARGE INSTRUCTIONS
[facility] Physical Therapy [address] [place] [address] [address] Phone: [phone] Fax: [phone] Discharge Summary  2-15 Patient name: Charles Ford  : 1960  Provider#: [id number] Referral source: Almaz Woodruff DO Medical/Treatment Diagnosis: Presence of right artificial hip joint [Z96.641] Prior Hospitalization: see medical history Comorbidities: See Plan of Care Prior Level of Function:See Plan of Care Medications: Verified on Patient Summary List 
 
Start of Care: 2020      Onset Date:R CISCO 2020 Visits from Start of Care: 22     Missed Visits: 0 Reporting Period : 2020 to 2020 ASSESSMENT/SUMMARY OF CARE: The Pt was seen for 22 outpatient physical therapy sessions s/p R CISCO on 2020. The Pt's therapy program focused on improving her R hip functional mobility, improving her hip and core strength, improving her balance and neuromuscular control, correcting her gait impairments, and improving her activity tolerance and endurance via therapeutic exercises and pain relieving modalities. The Pt has been able to safely return to all ADLs without any significant pain or limitation. The main complaint at this time, is that she is unable to cross the R LE to don/doff socks/shoes independently due to continued tightness in the R hip (it is getting progressively worse). Otherwise, she has no functional limitations and believes that she is 80% improved since beginning therapy. At this time, it is believed that the Pt has reached maximum benefit from skilled PT and will continue to progress well independently. The Pt was educated how to safely progress her HEP and voiced understanding. The Pt is discharged from skilled PT and will contact her referring provider with any further questions or concerns. Thank you for this referral. 
 
Progress towards goals / Updated goals: Short Term Goals: To be accomplished in 2 treatments: 
1.) The patient will be independent with their HEP consistently for at least one week- met Long Term Goals: To be accomplished in 16 treatments: 
1.) The patient will have at most 2/10 pain with daily activities- met 
2.) The patient will be able to ambulate for at least 30min without an AD or having to change positions due to pain- met 
3.) The patient will improve their FOTO score from 56 to at least 65 to show improvements in functional mobility- progressing FOTO 61/100 at discharge 4.) The patient will be able to comfortably get down to and up from the ground with minimal support in order to comfortably return to work- met 
  
RECOMMENDATIONS: 
[x]Discontinue therapy: [x]Patient has reached or is progressing toward set goals []Patient is non-compliant or has abdicated 
    []Due to lack of appreciable progress towards set goals []Other Jose Sharpe, PT 11/9/2020

## 2020-12-02 ENCOUNTER — OFFICE VISIT (OUTPATIENT)
Dept: ORTHOPEDIC SURGERY | Age: 60
End: 2020-12-02
Payer: COMMERCIAL

## 2020-12-02 VITALS
HEART RATE: 87 BPM | OXYGEN SATURATION: 98 % | TEMPERATURE: 97.9 F | SYSTOLIC BLOOD PRESSURE: 110 MMHG | DIASTOLIC BLOOD PRESSURE: 74 MMHG | HEIGHT: 67 IN | WEIGHT: 219 LBS | BODY MASS INDEX: 34.37 KG/M2

## 2020-12-02 DIAGNOSIS — Z96.641 S/P HIP REPLACEMENT, RIGHT: Primary | ICD-10-CM

## 2020-12-02 PROCEDURE — 99212 OFFICE O/P EST SF 10 MIN: CPT | Performed by: ORTHOPAEDIC SURGERY

## 2020-12-02 NOTE — LETTER
12/2/2020 10:13 AM 
 
Ms. Mona Espino [id number] [facility] [id number] To whom it may concern, 
 
 
 
 Mona Espino is under the care of [facility]. Mona Espino will be released to work on 12/7/20 with the following restrictions: no lifting, squatting, kneeling. If you have any questions or concerns, please feel free to contact my office.  
 
 
 
 
 
Sincerely, 
 
 
Jamie Tinsley, DO

## 2020-12-02 NOTE — PROGRESS NOTES
12/2/2020    Chief Complaint: Follow up for right hip replacement    HPI:  is a 61 y.o.  who is now 5 months status post right hip  arthroplasty. The patient states that they are doing well. The preoperative pain is gone and the postoperative pain is gone. Regular exercises have helped with residual symptoms. She still has some mobility issues with getting up from a kneeling position. Past Medical History:   Diagnosis Date    Arthritis     Cardiomyopathy [facility])     cardiologist-Dr. Steele Care     Heart failure ([facility] [address]     acute systolic heart failure    Thyroid disease        Past Surgical History:   Procedure Laterality Date    HX APPENDECTOMY      HX GYN      dysplasia    HX HIP REPLACEMENT      [place] EPHYS EVAL PACG CVDFB PRGRMG/REPRGRMG PARAMETERS N/A 10/14/2020    Eval Icd Generator & Leads W Testing At Implant performed by Taj Johns MD at [address] CATH LAB    [place] INSJ/RPLCMT PERM DFB W/TRNSVNS LDS 1/DUAL CHMBR N/A 10/14/2020    Insert Icd Single performed by Taj Johns MD at [address] [place] [address] CATH LAB       Current Outpatient Medications on File Prior to Visit   Medication Sig Dispense Refill    LevoxyL 137 mcg tablet       meloxicam (MOBIC) 15 mg tablet Take 1 Tab by mouth daily. 60 Tab 1    senna-docusate (PERICOLACE) 8.6-50 mg per tablet Take 1 Tab by mouth daily. 30 Tab 0    doxycycline (MONODOX) 100 mg capsule Take 200 mg by mouth two (2) times a day.  B.infantis-B.ani-B.long-B.bifi (Probiotic 4X) 10-15 mg TbEC Take 10-15 mg by mouth daily.  omega 3-dha-epa-fish oil (Fish Oil) 100-160-1,000 mg cap Take 1,000 mg by mouth daily. take 1 tablet daily      calcium carbonate (CALCIUM 600 PO) Take 1,000 mg by mouth daily.  fexofenadine-pseudoephedrine (Allegra-D 12 Hour)  mg Tb12 Take 1 Tab by mouth every twelve (12) hours.       OTHER Occuvite   Ruten-gums      furosemide (LASIX) 40 mg tablet TAKE 1 TABLET BY MOUTH EVERY DAY      metoprolol succinate (TOPROL-XL) 25 mg XL tablet TAKE 1 TABLET BY MOUTH EVERY DAY      Entresto 24-26 mg tablet TAKE 1 TABLET BY MOUTH TWICE A DAY       No current facility-administered medications on file prior to visit. No Known Allergies    Family History   Problem Relation Age of Onset    Heart Disease Mother     Hypertension Mother     Diabetes Brother     Hypertension Brother        Social History     Socioeconomic History    Marital status: SINGLE     Spouse name: Not on file    Number of children: Not on file    Years of education: Not on file    Highest education level: Not on file   Tobacco Use    Smoking status: Former Smoker     Packs/day: 0.50     Years: 30.00     Pack years: 15.00     Last attempt to quit: 2019     Years since quittin.9    Smokeless tobacco: Never Used   Substance and Sexual Activity    Alcohol use: Yes     Alcohol/week: 1.0 standard drinks     Types: 1 Glasses of wine per week     Comment: weekends    Drug use: Yes     Types: Prescription, OTC    Sexual activity: Yes   Other Topics Concern         Review of Systems:       General: Denies headache, lethargy, fever, weight loss  Ears/Nose/Throat: Denies ear discharge, drainage, nosebleeds, hoarse voice, dental problems  Cardiovascular: Denies chest pain, shortness of breath  Lungs: Denies chest pain, breathing problems, wheezing, pneumonia  Stomach: Denies stomach pain, heartburn, constipation, irritable bowel  Skin: Denies rash, sores, open wounds  Musculoskeletal: right leg weakness  Genitourinary: Denies dysuria, hematuria, polyuria  Gastrointestinal: Denies constipation, obstipation, diarrhea  Neurological: Denies changes in sight, smell, hearing, taste, seizures. Denies loss of consciousness.   Psychiatric: Denies depression, sleep pattern changes, anxiety, change in personality  Endocrine: Denies mood swings, heat or cold intolerance  Hematologic/Lymphatic: Denies anemia, purpura, petechia  Allergic/Immunologic: Denies swelling of throat, pain or swelling at lymph nodes      Physical Examination:    Visit Vitals  /74 (BP 1 Location: Right arm, BP Patient Position: Sitting)   Pulse 87   Temp 97.9 °F (36.6 °C) (Tympanic)   Ht 5' 7\" (1.702 m)   Wt 219 lb (99.3 kg)   SpO2 98%   BMI 34.30 kg/m²        General: AOX3, no apparent distress  Psychiatric: mood and affect appropriate  Lungs: breathing is symmetric and unlabored bilaterally  Heart: regular rate and rhythm  Abdomen: no guarding  Head: normocephalic, atraumatic  Skin: No significant abnormalities, good turgor  Sensation intact to light touch: L1-S1 dermatomes  Muscular exam: 5/5 strength in all major muscle groups unless noted in specialty exam.    Extremities:      Left upper extremity: Full active and passive range of motion without pain, deformity, no open wound, strength 5/5 in all major muscle groups. Right upper extremity: Full active and passive range of motion without pain, deformity, no open wound, strength 5/5 in all major muscle groups. Left lower extremity: Full active and passive range of motion without pain, deformity, no open wound, strength 5/5 in all major muscle groups. Right lower extremity:  Well healed surgical wound is noted. Patient ambulates with no device and no limp. No deformity is noted. Circumduction of the hip does not cause arthritic pain as it had preoperatively. Strength testing is indicative of 4+/5 strength at hip flexion, extension,  5/5 knee flexion and extension, tibialis anterior, EHL, and FHL. Sensation is intact to light touch in the L1-S1 dermatomes with lateral femoral cutaneous nerve function intact. Capillary refill is less than 2 seconds distally. Diagnostics:    Pertinent Xrays:  Xrays are available of the right hip, they indicate a total hip arthroplasty in excellent position without evidence of loosening or failure.   Leg lengths are equal.        Assessment: Aftercare, status post right hip arthroplasty    Plan:   will continue physical therapy exercises. We discussed the importance of continued vigilance to this end. We also discussed dental prophylaxis and safe activities and reasonable life choices regarding activity level. Patient stated their understanding. Pain control for now will be as needed only, and will be patient directed. Release to light duties.  will follow up in 3 months. no x-rays on follow up. Ms. Marge Pettit has a reminder for a \"due or due soon\" health maintenance. I have asked that she contact her primary care provider for follow-up on this health maintenance.

## 2021-01-14 DIAGNOSIS — Z47.89 ORTHOPEDIC AFTERCARE: Primary | ICD-10-CM

## 2021-01-14 RX ORDER — AMOXICILLIN AND CLAVULANATE POTASSIUM 500; 125 MG/1; MG/1
4 TABLET, FILM COATED ORAL ONCE
Qty: 4 TAB | Refills: 1 | Status: SHIPPED | OUTPATIENT
Start: 2021-01-14 | End: 2021-01-14 | Stop reason: DRUGHIGH

## 2021-01-14 RX ORDER — AMOXICILLIN AND CLAVULANATE POTASSIUM 500; 125 MG/1; MG/1
4 TABLET, FILM COATED ORAL ONCE
Qty: 4 TAB | Refills: 1 | Status: SHIPPED | OUTPATIENT
Start: 2021-01-14 | End: 2021-08-12 | Stop reason: SDUPTHER

## 2021-03-03 ENCOUNTER — OFFICE VISIT (OUTPATIENT)
Dept: ORTHOPEDIC SURGERY | Age: 61
End: 2021-03-03
Payer: COMMERCIAL

## 2021-03-03 VITALS
HEIGHT: 67 IN | TEMPERATURE: 96.1 F | DIASTOLIC BLOOD PRESSURE: 65 MMHG | SYSTOLIC BLOOD PRESSURE: 100 MMHG | HEART RATE: 85 BPM | WEIGHT: 218 LBS | OXYGEN SATURATION: 98 % | BODY MASS INDEX: 34.21 KG/M2

## 2021-03-03 DIAGNOSIS — M16.0 BILATERAL PRIMARY OSTEOARTHRITIS OF HIP: ICD-10-CM

## 2021-03-03 DIAGNOSIS — Z96.641 S/P HIP REPLACEMENT, RIGHT: Primary | ICD-10-CM

## 2021-03-03 PROCEDURE — 99212 OFFICE O/P EST SF 10 MIN: CPT | Performed by: ORTHOPAEDIC SURGERY

## 2021-03-03 NOTE — PROGRESS NOTES
Identified pt with two pt identifiers (name and ). Reviewed chart in preparation for visit and have obtained necessary documentation. Sarita Fragoso is a 61 y.o. female  Chief Complaint   Patient presents with    Surgical Follow-up     RT hip     Visit Vitals  /65 (BP 1 Location: Right arm, BP Patient Position: Sitting, BP Cuff Size: Large adult)   Pulse 85   Temp (!) 96.1 °F (35.6 °C) (Tympanic)   Ht 5' 7\" (1.702 m)   Wt 218 lb (98.9 kg)   SpO2 98%   BMI 34.14 kg/m²     1. Have you been to the ER, urgent care clinic since your last visit? Hospitalized since your last visit? No    2. Have you seen or consulted any other health care providers outside of the [address] [place] [address] since your last visit? Include any pap smears or colon screening.  No

## 2021-03-03 NOTE — PROGRESS NOTES
3/3/2021    Chief Complaint: Follow up for right hip replacement    HPI:  is a 61 y.o.  who is now 8 months status post right hip  arthroplasty. The patient states that they are doing well. The preoperative pain is gone and the postoperative pain is gone. Regular exercises have helped with residual symptoms. Past Medical History:   Diagnosis Date    Arthritis     Cardiomyopathy [facility])     cardiologist-Dr. Grzegorz Cruz     Heart failure ([facility] [address]     acute systolic heart failure    Thyroid disease        Past Surgical History:   Procedure Laterality Date    HX APPENDECTOMY      HX GYN      dysplasia    HX HIP REPLACEMENT      NE EPHYS EVAL PACG CVDFB PRGRMG/REPRGRMG PARAMETERS N/A 10/14/2020    Eval Icd Generator & Leads W Testing At Implant performed by Telma Burns MD at [address] CATH LAB    NE INSJ/RPLCMT PERM DFB W/TRNSVNS LDS 1/DUAL CHMBR N/A 10/14/2020    Insert Icd Single performed by Tel[facility] Burns MD [facility] [address] [facility] [address] CATH [facility]       Current Outpatient Medications on File Prior to Visit   Medication Sig Dispense Refill    LevoxyL 137 mcg tablet       meloxicam (MOBIC) 15 mg tablet Take 1 Tab by mouth daily. 60 Tab 1    senna-docusate (PERICOLACE) 8.6-50 mg per tablet Take 1 Tab by mouth daily. 30 Tab 0    doxycycline (MONODOX) 100 mg capsule Take 200 mg by mouth two (2) times a day.  B.infantis-B.ani-B.long-B.bifi (Probiotic 4X) 10-15 mg TbEC Take 10-15 mg by mouth daily.  omega 3-dha-epa-fish oil (Fish Oil) 100-160-1,000 mg cap Take 1,000 mg by mouth daily. take 1 tablet daily      calcium carbonate (CALCIUM 600 PO) Take 1,000 mg by mouth daily.  fexofenadine-pseudoephedrine (Allegra-D 12 Hour)  mg Tb12 Take 1 Tab by mouth every twelve (12) hours.       OTHER Occuvite   Ruten-gums      furosemide (LASIX) 40 mg tablet TAKE 1 TABLET BY MOUTH EVERY DAY      metoprolol succinate (TOPROL-XL) 25 mg XL tablet TAKE 1 TABLET BY MOUTH EVERY DAY      Entresto 24-26 mg tablet TAKE 1 TABLET BY MOUTH TWICE A DAY       No current facility-administered medications on file prior to visit. No Known Allergies    Family History   Problem Relation Age of Onset    Heart Disease Mother     Hypertension Mother     Diabetes Brother     Hypertension Brother        Social History     Socioeconomic History    Marital status: SINGLE     Spouse name: Not on file    Number of children: Not on file    Years of education: Not on file    Highest education level: Not on file   Tobacco Use    Smoking status: Former Smoker     Packs/day: 0.50     Years: 30.00     Pack years: 15.00     Quit date:      Years since quittin.1    Smokeless tobacco: Never Used   Substance and Sexual Activity    Alcohol use: Yes     Alcohol/week: 1.0 standard drinks     Types: 1 Glasses of wine per week     Comment: weekends    Drug use: Yes     Types: Prescription, OTC    Sexual activity: Yes   Other Topics Concern         Review of Systems:       General: Denies headache, lethargy, fever, weight loss  Ears/Nose/Throat: Denies ear discharge, drainage, nosebleeds, hoarse voice, dental problems  Cardiovascular: Denies chest pain, shortness of breath  Lungs: Denies chest pain, breathing problems, wheezing, pneumonia  Stomach: Denies stomach pain, heartburn, constipation, irritable bowel  Skin: Denies rash, sores, open wounds  Musculoskeletal: mild right sided incisional numbness  Genitourinary: Denies dysuria, hematuria, polyuria  Gastrointestinal: Denies constipation, obstipation, diarrhea  Neurological: Denies changes in sight, smell, hearing, taste, seizures. Denies loss of consciousness.   Psychiatric: Denies depression, sleep pattern changes, anxiety, change in personality  Endocrine: Denies mood swings, heat or cold intolerance  Hematologic/Lymphatic: Denies anemia, purpura, petechia  Allergic/Immunologic: Denies swelling of throat, pain or swelling at lymph nodes      Physical Examination:    Visit Vitals  /65 (BP 1 Location: Right arm, BP Patient Position: Sitting, BP Cuff Size: Large adult)   Pulse 85   Temp (!) 96.1 °F (35.6 °C) (Tympanic)   Ht 5' 7\" (1.702 m)   Wt 218 lb (98.9 kg)   SpO2 98%   BMI 34.14 kg/m²        General: AOX3, no apparent distress  Psychiatric: mood and affect appropriate  Lungs: breathing is symmetric and unlabored bilaterally  Heart: regular rate and rhythm  Abdomen: no guarding  Head: normocephalic, atraumatic  Skin: No significant abnormalities, good turgor  Sensation intact to light touch: L1-S1 dermatomes  Muscular exam: 5/5 strength in all major muscle groups unless noted in specialty exam.    Extremities:      Left upper extremity: Full active and passive range of motion without pain, deformity, no open wound, strength 5/5 in all major muscle groups. Right upper extremity: Full active and passive range of motion without pain, deformity, no open wound, strength 5/5 in all major muscle groups. Left lower extremity: Full active and passive range of motion without pain, deformity, no open wound, strength 5/5 in all major muscle groups. Right lower extremity:  Well healed surgical wound is noted. Patient ambulates with no device and no limp. No deformity is noted. Circumduction of the hip does not cause arthritic pain as it had preoperatively. Strength testing is indicative of 5/5 strength at hip flexion, extension,  5/5 knee flexion and extension, tibialis anterior, EHL, and FHL. Sensation is intact to light touch in the L1-S1 dermatomes with lateral femoral cutaneous nerve function slightly diminished at lateral thigh. Capillary refill is less than 2 seconds distally. Diagnostics:    Pertinent Xrays:  Xrays are available of the right hip, they indicate a total hip arthroplasty in excellent position without evidence of loosening or failure.   Leg lengths are equal.        Assessment: Aftercare, status post right hip arthroplasty    Plan:   will continue physical therapy exercises. We discussed the importance of continued vigilance to this end. We also discussed dental prophylaxis and safe activities and reasonable life choices regarding activity level. Patient stated their understanding. Pain control for now will be as needed only, and will be patient directed.  will follow up in 5 months. Right hip x-rays on follow up. Ms. Rosa Traore has a reminder for a \"due or due soon\" health maintenance. I have asked that she contact her primary care provider for follow-up on this health maintenance.

## 2021-07-06 DIAGNOSIS — M25.551 RIGHT HIP PAIN: Primary | ICD-10-CM

## 2021-07-07 ENCOUNTER — HOSPITAL ENCOUNTER (OUTPATIENT)
Dept: GENERAL RADIOLOGY | Age: 61
Discharge: HOME | End: 2021-07-07
Payer: COMMERCIAL

## 2021-07-07 ENCOUNTER — OFFICE VISIT (OUTPATIENT)
Dept: ORTHOPEDIC SURGERY | Age: 61
End: 2021-07-07
Payer: COMMERCIAL

## 2021-07-07 VITALS
BODY MASS INDEX: 34.21 KG/M2 | HEART RATE: 92 BPM | SYSTOLIC BLOOD PRESSURE: 76 MMHG | DIASTOLIC BLOOD PRESSURE: 55 MMHG | WEIGHT: 218 LBS | TEMPERATURE: 96.2 F | HEIGHT: 67 IN | OXYGEN SATURATION: 99 %

## 2021-07-07 DIAGNOSIS — M25.551 RIGHT HIP PAIN: ICD-10-CM

## 2021-07-07 DIAGNOSIS — Z96.641 S/P HIP REPLACEMENT, RIGHT: Primary | ICD-10-CM

## 2021-07-07 PROCEDURE — 99213 OFFICE O/P EST LOW 20 MIN: CPT | Performed by: ORTHOPAEDIC SURGERY

## 2021-07-07 PROCEDURE — 73502 X-RAY EXAM HIP UNI 2-3 VIEWS: CPT

## 2021-07-07 NOTE — PROGRESS NOTES
Identified pt with two pt identifiers (name and ). Reviewed chart in preparation for visit and have obtained necessary documentation. Olga Calderón is a 61 y.o. female  Chief Complaint   Patient presents with    Surgical Follow-up     1 yr RT hip     Visit Vitals  BP (!) 76/55 (BP 1 Location: Right arm, BP Patient Position: Sitting, BP Cuff Size: Large adult)   Pulse 92   Temp (!) 96.2 °F (35.7 °C) (Tympanic)   Ht 5' 7\" (1.702 m)   Wt 218 lb (98.9 kg)   SpO2 99%   BMI 34.14 kg/m²     1. Have you been to the ER, urgent care clinic since your last visit? Hospitalized since your last visit? No    2. Have you seen or consulted any other health care providers outside of the [address] [place] [address] since your last visit? Include any pap smears or colon screening.  No

## 2021-07-07 NOTE — PROGRESS NOTES
7/7/2021    Chief Complaint: Follow up for right hip replacement    HPI:  is a 61 y.o.  who is now one year status post right hip  arthroplasty. The patient states that they are doing well. The preoperative pain is gone and the postoperative pain is minimal.  Regular exercises have helped with residual symptoms. Past Medical History:   Diagnosis Date    Arthritis     Cardiomyopathy [facility])     cardiologist-Dr. Lenard Walter     Heart failure ([facility] [address]     acute systolic heart failure    Thyroid disease        Past Surgical History:   Procedure Laterality Date    HX APPENDECTOMY      HX GYN      dysplasia    HX HIP REPLACEMENT      NC EPHYS EVAL PACG CVDFB PRGRMG/REPRGRMG PARAMETERS N/A 10/14/2020    Eval Icd Generator & Leads W Testing At Implant performed by José Manuel Castañeda MD at [address] CATH LAB    NC INSJ/RPLCMT PERM DFB W/TRNSVNS LDS 1/DUAL CHMBR N/A 10/14/2020    Insert Icd Single performed by José Manuel Castañeda MD at [address] CATH LAB       Current Outpatient Medications on File Prior to Visit   Medication Sig Dispense Refill    LevoxyL 137 mcg tablet       meloxicam (MOBIC) 15 mg tablet Take 1 Tab by mouth daily. 60 Tab 1    senna-docusate (PERICOLACE) 8.6-50 mg per tablet Take 1 Tab by mouth daily. 30 Tab 0    doxycycline (MONODOX) 100 mg capsule Take 200 mg by mouth two (2) times a day.  B.infantis-B.ani-B.long-B.bifi (Probiotic 4X) 10-15 mg TbEC Take 10-15 mg by mouth daily.  omega 3-dha-epa-fish oil (Fish Oil) 100-160-1,000 mg cap Take 1,000 mg by mouth daily. take 1 tablet daily      calcium carbonate (CALCIUM 600 PO) Take 1,000 mg by mouth daily.  fexofenadine-pseudoephedrine (Allegra-D 12 Hour)  mg Tb12 Take 1 Tab by mouth every twelve (12) hours.       OTHER Occuvite   Ruten-gums      furosemide (LASIX) 40 mg tablet TAKE 1 TABLET BY MOUTH EVERY DAY      metoprolol succinate (TOPROL-XL) 25 mg XL tablet TAKE 1 TABLET BY MOUTH EVERY DAY      Entresto 24-26 mg tablet TAKE 1 TABLET BY MOUTH TWICE A DAY       No current facility-administered medications on file prior to visit. No Known Allergies    Family History   Problem Relation Age of Onset    Heart Disease Mother     Hypertension Mother     Diabetes Brother     Hypertension Brother        Social History     Socioeconomic History    Marital status: SINGLE     Spouse name: Not on file    Number of children: Not on file    Years of education: Not on file    Highest education level: Not on file   Tobacco Use    Smoking status: Former Smoker     Packs/day: 0.50     Years: 30.00     Pack years: 15.00     Quit date:      Years since quittin.5    Smokeless tobacco: Never Used   Substance and Sexual Activity    Alcohol use: Yes     Alcohol/week: 1.0 standard drinks     Types: 1 Glasses of wine per week     Comment: weekends    Drug use: Yes     Types: Prescription, OTC    Sexual activity: Yes   Other Topics Concern     Social Determinants of Health     Financial Resource Strain:     Difficulty of Paying Living Expenses:    Food Insecurity:     Worried About Running Out of Food in the Last Year:     Ran Out of Food in the Last Year:    Transportation Needs:     Lack of Transportation (Medical):      Lack of Transportation (Non-Medical):    Physical Activity:     Days of Exercise per Week:     Minutes of Exercise per Session:    Stress:     Feeling of Stress :    Social Connections:     Frequency of Communication with Friends and Family:     Frequency of Social Gatherings with Friends and Family:     Attends Mu-ism Services:     Active Member of Clubs or Organizations:     Attends Club or Organization Meetings:     Marital Status:          Review of Systems:       General: Denies headache, lethargy, fever, weight loss  Ears/Nose/Throat: Denies ear discharge, drainage, nosebleeds, hoarse voice, dental problems  Cardiovascular: Denies chest pain, shortness of breath  Lungs: Denies chest pain, breathing problems, wheezing, pneumonia  Stomach: Denies stomach pain, heartburn, constipation, irritable bowel  Skin: Denies rash, sores, open wounds  Musculoskeletal: Minor musculoskeletal pain  Genitourinary: Denies dysuria, hematuria, polyuria  Gastrointestinal: Denies constipation, obstipation, diarrhea  Neurological: Denies changes in sight, smell, hearing, taste, seizures. Denies loss of consciousness. Psychiatric: Denies depression, sleep pattern changes, anxiety, change in personality  Endocrine: Denies mood swings, heat or cold intolerance  Hematologic/Lymphatic: Denies anemia, purpura, petechia  Allergic/Immunologic: Denies swelling of throat, pain or swelling at lymph nodes      Physical Examination:    Visit Vitals  BP (!) 76/55 (BP 1 Location: Right arm, BP Patient Position: Sitting, BP Cuff Size: Large adult)   Pulse 92   Temp (!) 96.2 °F (35.7 °C) (Tympanic)   Ht 5' 7\" (1.702 m)   Wt 218 lb (98.9 kg)   SpO2 99%   BMI 34.14 kg/m²        General: AOX3, no apparent distress  Psychiatric: mood and affect appropriate  Lungs: breathing is symmetric and unlabored bilaterally  Heart: regular rate and rhythm  Abdomen: no guarding  Head: normocephalic, atraumatic  Skin: No significant abnormalities, good turgor  Sensation intact to light touch: L1-S1 dermatomes  Muscular exam: 5/5 strength in all major muscle groups unless noted in specialty exam.    Extremities:      Left upper extremity: Full active and passive range of motion without pain, deformity, no open wound, strength 5/5 in all major muscle groups. Right upper extremity: Full active and passive range of motion without pain, deformity, no open wound, strength 5/5 in all major muscle groups. Left lower extremity: Full active and passive range of motion without pain, deformity, no open wound, strength 5/5 in all major muscle groups. Right lower extremity:  Well healed surgical wound is noted.   Patient ambulates with no device and no limp.  No deformity is noted. Circumduction of the hip does not cause arthritic pain as it had preoperatively. Strength testing is indicative of 5/5 strength at hip flexion, extension,  5/5 knee flexion and extension, tibialis anterior, EHL, and FHL. Sensation is intact to light touch in the L1-S1 dermatomes with lateral femoral cutaneous nerve function slightly diminished. Capillary refill is less than 2 seconds distally. Diagnostics:    Pertinent Xrays:  Xrays are available of the right hip, they indicate a total hip arthroplasty in excellent position without evidence of loosening or failure. Leg lengths are equal.        Assessment: Aftercare, status post right hip arthroplasty    Plan:   will continue physical therapy exercises. We discussed the importance of continued vigilance to this end. We also discussed dental prophylaxis and safe activities and reasonable life choices regarding activity level. Patient stated their understanding. Pain control for now will be as needed only, and will be patient directed.  will follow up in 2 years. Right hip x-rays on follow up. Ms. Katie Barnhart has a reminder for a \"due or due soon\" health maintenance. I have asked that she contact her primary care provider for follow-up on this health maintenance.

## 2021-08-12 DIAGNOSIS — Z47.89 ORTHOPEDIC AFTERCARE: ICD-10-CM

## 2021-08-12 RX ORDER — AMOXICILLIN AND CLAVULANATE POTASSIUM 500; 125 MG/1; MG/1
4 TABLET, FILM COATED ORAL ONCE
Qty: 4 TABLET | Refills: 1 | Status: SHIPPED | OUTPATIENT
Start: 2021-08-12 | End: 2021-08-12

## 2021-11-04 NOTE — ED PROVIDER NOTES
HPI Comments: Ad Ross is a 64 y.o. female with history of appendectomy who presents ambulatory with a family member to ED c/o constant worsening 10/10 aching RLQ pain for the past week, along with associated constipation for the past 5 days. Pt states she has been taking OTC stool softeners with no relief, and she has been passing a small amount of gas. Per medical records on 1/25/17 pt came to the [facility] ED for similar symptoms, and CT abdomen/pelvis without contrast was normal. Pt states she followed up with her OBGYN after that ED visit and received a normal workup, but she was referred to the [facility] ED for nausea and vomiting. Pt states she has not had any nausea or vomiting in the past 4 days. Pt denies any fever, chest pain. PCP:  Brian Guthrie MD    There are no other complaints, changes or physical findings at this time. Written by Arlin Linton, ED Scribe, as dictated by Zakiya Bailon DO. The history is provided by the patient. No  was used. No past medical history on file. Past Surgical History:   Procedure Laterality Date    Hx gyn       dysplasia         Family History:   Problem Relation Age of Onset    Heart Disease Mother        Social History     Social History    Marital status: SINGLE     Spouse name: N/A    Number of children: N/A    Years of education: N/A     Occupational History    Not on file. Social History Main Topics    Smoking status: Current Every Day Smoker     Packs/day: 0.50     Years: 30.00    Smokeless tobacco: Not on file    Alcohol use Yes      Comment: weekends    Drug use: Not on file    Sexual activity: Not on file     Other Topics Concern    Not on file     Social History Narrative         ALLERGIES: Review of patient's allergies indicates no known allergies. Review of Systems   Constitutional: Negative for activity change. HENT: Negative for congestion and sore throat.     Eyes: Negative for pain and Refills forwarded to Dr Drake to address as Gabapentin requires PDMP review and approval    redness. Respiratory: Negative for cough, chest tightness and shortness of breath. Cardiovascular: Negative for chest pain and palpitations. Gastrointestinal: Positive for abdominal pain, constipation, nausea and vomiting. Negative for diarrhea. Genitourinary: Negative for frequency and urgency. Musculoskeletal: Negative for neck pain. Skin: Negative for rash. Neurological: Negative for syncope, light-headedness and headaches. Psychiatric/Behavioral: Negative for confusion. Vitals:    01/30/17 1023 01/30/17 1605   BP: 103/73 120/72   Pulse: 84 72   Resp: 16 16   Temp: 98.6 °F (37 °C)    SpO2: 98% 99%   Weight: 71.5 kg (157 lb 10.1 oz)    Height: 5' 7\" (1.702 m)             Physical Exam   Constitutional: She is oriented to person, place, and time. She appears well-developed and well-nourished. No distress. HENT:   Head: Normocephalic and atraumatic. Mouth/Throat: Oropharynx is clear and moist.   Eyes: Conjunctivae and EOM are normal.   Neck: Neck supple. No JVD present. No tracheal deviation present. Cardiovascular: Normal rate, regular rhythm and intact distal pulses. Exam reveals no gallop and no friction rub. No murmur heard. Pulmonary/Chest: Effort normal and breath sounds normal. No stridor. No respiratory distress. She has no wheezes. Abdominal: Soft. Bowel sounds are normal. She exhibits no distension and no mass. There is no tenderness. There is no guarding. Musculoskeletal: Normal range of motion. She exhibits no edema or tenderness. No deformity   Neurological: She is alert and oriented to person, place, and time. She has normal strength. No focal deficits   Skin: Skin is warm, dry and intact. No rash noted. Psychiatric: She has a normal mood and affect. Her behavior is normal. Judgment and thought content normal.   Nursing note and vitals reviewed.        MDM  Number of Diagnoses or Management Options  Diagnosis management comments: Pt was seen here on 1/25/17 for RLQ abdominal pain, had a negative work up including blood work and a normal CT abd/pelvis. Pt continues with RLQ pain. Abdominal exam is benign, pt has no s/s of bowel obstruction. DDx includes constipation, uti. Will recheck labs, acute series. Will treat with magnesium citrate, then reassess. Amount and/or Complexity of Data Reviewed  Clinical lab tests: reviewed and ordered  Tests in the radiology section of CPT®: reviewed and ordered  Review and summarize past medical records: yes    Patient Progress  Patient progress: stable    ED Course       Procedures    2:00 PM  Pt is receiving her enema. 3:15 PM  Pt had a small bowel movement, states her pain is improved and she can \"feel things moving\". Will discharge patient home with golytely and have her follow up with her PCP. LABORATORY TESTS:  Recent Results (from the past 12 hour(s))   METABOLIC PANEL, COMPREHENSIVE    Collection Time: 01/30/17 11:35 AM   Result Value Ref Range    Sodium 137 136 - 145 mmol/L    Potassium 4.1 3.5 - 5.1 mmol/L    Chloride 101 97 - 108 mmol/L    CO2 28 21 - 32 mmol/L    Anion gap 8 5 - 15 mmol/L    Glucose 88 65 - 100 mg/dL    BUN 10 6 - 20 MG/DL    Creatinine 0.53 (L) 0.55 - 1.02 MG/DL    BUN/Creatinine ratio 19 12 - 20      GFR est AA >60 >60 ml/min/1.73m2    GFR est non-AA >60 >60 ml/min/1.73m2    Calcium 8.7 8.5 - 10.1 MG/DL    Bilirubin, total 0.4 0.2 - 1.0 MG/DL    ALT 28 12 - 78 U/L    AST 19 15 - 37 U/L    Alk.  phosphatase 54 45 - 117 U/L    Protein, total 6.5 6.4 - 8.2 g/dL    Albumin 3.4 (L) 3.5 - 5.0 g/dL    Globulin 3.1 2.0 - 4.0 g/dL    A-G Ratio 1.1 1.1 - 2.2     CBC WITH AUTOMATED DIFF    Collection Time: 01/30/17 11:35 AM   Result Value Ref Range    WBC 6.8 3.6 - 11.0 K/uL    RBC 4.54 3.80 - 5.20 M/uL    HGB 15.2 11.5 - 16.0 g/dL    HCT 44.0 35.0 - 47.0 %    MCV 96.9 80.0 - 99.0 FL    MCH 33.5 26.0 - 34.0 PG    MCHC 34.5 30.0 - 36.5 g/dL    RDW 12.9 11.5 - 14.5 %    PLATELET 160 499 - 760 K/uL NEUTROPHILS 60 32 - 75 %    LYMPHOCYTES 28 12 - 49 %    MONOCYTES 8 5 - 13 %    EOSINOPHILS 4 0 - 7 %    BASOPHILS 0 0 - 1 %    ABS. NEUTROPHILS 4.1 1.8 - 8.0 K/UL    ABS. LYMPHOCYTES 1.9 0.8 - 3.5 K/UL    ABS. MONOCYTES 0.6 0.0 - 1.0 K/UL    ABS. EOSINOPHILS 0.2 0.0 - 0.4 K/UL    ABS. BASOPHILS 0.0 0.0 - 0.1 K/UL   URINALYSIS W/ REFLEX CULTURE    Collection Time: 01/30/17 11:35 AM   Result Value Ref Range    Color YELLOW/STRAW      Appearance CLEAR CLEAR      Specific gravity 1.009 1.003 - 1.030      pH (UA) 7.5 5.0 - 8.0      Protein NEGATIVE  NEG mg/dL    Glucose NEGATIVE  NEG mg/dL    Ketone TRACE (A) NEG mg/dL    Bilirubin NEGATIVE  NEG      Blood NEGATIVE  NEG      Urobilinogen 0.2 0.2 - 1.0 EU/dL    Nitrites NEGATIVE  NEG      Leukocyte Esterase NEGATIVE  NEG      WBC 0-4 0 - 4 /hpf    RBC 0-5 0 - 5 /hpf    Epithelial cells FEW FEW /lpf    Bacteria NEGATIVE  NEG /hpf    UA:UC IF INDICATED CULTURE NOT INDICATED BY UA RESULT CNI      Hyaline Cast 0-2 0 - 5 /lpf       IMAGING RESULTS:  CXR Results  (Last 48 hours)               01/30/17 1201  XR ABD ACUTE W 1 V CHEST Final result    Impression:  IMPRESSION: No acute chest or abdominal abnormality demonstrated. Narrative:  EXAM:  XR ABD ACUTE W 1 V CHEST   INDICATION:  Abdominal Pain, constipation, last bowel movement 5 days ago. Right   lower quadrant pain. COMPARISON:  CT abdomen and pelvis 1/25/17   TECHNIQUE:     Acute abdomen series with PA chest and supine and upright abdominal x-rays were   obtained. FINDINGS:    Heart size and pulmonary vascularity are normal. Lungs are clear. No pleural   fluid or pneumothorax. Scattered gas and fecal matter in the colon. Mild prominence of feces right   colon. Some scattered gas in nondistended small bowel. No free intraperitoneal air. Some degenerative changes noted in the thoracolumbar spine.                  MEDICATIONS GIVEN:  Medications   magnesium citrate solution 296 mL (296 mL Oral Given 1/30/17 1158)   dicyclomine (BENTYL) tablet 20 mg (20 mg Oral Given 1/30/17 1158)       IMPRESSION:  1. Constipation, unspecified constipation type    2. RLQ abdominal pain        PLAN:  1. Discharge Medication List as of 1/30/2017  3:11 PM      START taking these medications    Details   PEG 3350-Electrolytes (COLYTE;GOLYTELY) solr Take 4,000 mL by mouth once for 1 dose. Over 4 hours, Print, Disp-1 Bottle, R-0         CONTINUE these medications which have NOT CHANGED    Details   oxyCODONE-acetaminophen (PERCOCET) 5-325 mg per tablet Take 1 Tab by mouth every four (4) hours as needed for Pain. Max Daily Amount: 6 Tabs., Print, Disp-20 Tab, R-0      levothyroxine (LEVOXYL) 112 mcg tablet Take  by mouth Daily (before breakfast). Historical Med      DOXYCYCLINE CALCIUM PO Take  by mouth. Historical Med           2. Follow-up Information     Follow up With Details Comments [address] [facility] MD Call  [facility] [address] [place] [address]  [phone]      [facility] EMERGENCY DEPT  As needed, If symptoms worsen [address]  [address] [facility]  [phone]        Return to ED if worse       DISCHARGE NOTE  4:59 PM  The patient has been re-evaluated and is ready for discharge. Reviewed available results with patient. Counseled pt on diagnosis and care plan. Pt has expressed understanding, and all questions have been answered. Pt agrees with plan and agrees to follow up as recommended, or return to the ED if their symptoms worsen. Discharge instructions have been provided and explained to the pt, along with reasons to return to the ED. Attestations: This note is prepared by Arlin Linton, acting as Scribe for [employer], DO. [employer], DO: The scribe's documentation has been prepared under my direction and personally reviewed by me in its entirety.  I confirm that the note above accurately reflects all work, treatment, procedures, and medical decision making performed by me.

## 2022-02-16 ENCOUNTER — TELEPHONE (OUTPATIENT)
Dept: ORTHOPEDIC SURGERY | Age: 62
End: 2022-02-16

## 2022-02-16 DIAGNOSIS — Z47.89 ORTHOPEDIC AFTERCARE: ICD-10-CM

## 2022-02-16 DIAGNOSIS — Z47.89 ORTHOPEDIC AFTERCARE: Primary | ICD-10-CM

## 2022-02-16 RX ORDER — AMOXICILLIN AND CLAVULANATE POTASSIUM 500; 125 MG/1; MG/1
4 TABLET, FILM COATED ORAL ONCE
Qty: 4 TABLET | Refills: 0 | Status: SHIPPED | OUTPATIENT
Start: 2022-02-16 | End: 2022-02-16 | Stop reason: SDUPTHER

## 2022-02-17 RX ORDER — AMOXICILLIN AND CLAVULANATE POTASSIUM 500; 125 MG/1; MG/1
4 TABLET, FILM COATED ORAL ONCE
Qty: 4 TABLET | Refills: 0 | Status: SHIPPED | OUTPATIENT
Start: 2022-02-17 | End: 2022-02-18 | Stop reason: SDUPTHER

## 2022-02-18 DIAGNOSIS — Z47.89 ORTHOPEDIC AFTERCARE: ICD-10-CM

## 2022-02-19 RX ORDER — AMOXICILLIN AND CLAVULANATE POTASSIUM 500; 125 MG/1; MG/1
4 TABLET, FILM COATED ORAL ONCE
Qty: 4 TABLET | Refills: 0 | Status: SHIPPED | OUTPATIENT
Start: 2022-02-19 | End: 2022-02-19

## 2022-03-18 PROBLEM — I50.22 SYSTOLIC CHF, CHRONIC (HCC): Status: ACTIVE | Noted: 2020-06-26

## 2022-03-18 PROBLEM — I42.8 CARDIOMYOPATHY, NONISCHEMIC (HCC): Status: ACTIVE | Noted: 2020-04-15

## 2022-03-18 PROBLEM — Z87.891 FORMER CIGARETTE SMOKER: Status: ACTIVE | Noted: 2020-02-21

## 2022-03-18 PROBLEM — Z96.641 S/P HIP REPLACEMENT, RIGHT: Status: ACTIVE | Noted: 2020-12-02

## 2022-03-19 PROBLEM — E03.9 ACQUIRED HYPOTHYROIDISM: Status: ACTIVE | Noted: 2018-04-03

## 2022-03-19 PROBLEM — M16.0 BILATERAL PRIMARY OSTEOARTHRITIS OF HIP: Status: ACTIVE | Noted: 2020-03-30

## 2022-03-20 PROBLEM — I50.21 ACUTE SYSTOLIC CONGESTIVE HEART FAILURE (HCC): Status: ACTIVE | Noted: 2020-04-15

## 2022-03-20 PROBLEM — M16.11 UNILATERAL PRIMARY OSTEOARTHRITIS, RIGHT HIP: Status: ACTIVE | Noted: 2020-07-06

## 2022-06-30 DIAGNOSIS — Z47.89 ORTHOPEDIC AFTERCARE: Primary | ICD-10-CM

## 2022-07-05 ENCOUNTER — OFFICE VISIT (OUTPATIENT)
Dept: ORTHOPEDIC SURGERY | Age: 62
End: 2022-07-05
Payer: COMMERCIAL

## 2022-07-05 ENCOUNTER — HOSPITAL ENCOUNTER (OUTPATIENT)
Dept: GENERAL RADIOLOGY | Age: 62
Discharge: HOME | End: 2022-07-05
Payer: COMMERCIAL

## 2022-07-05 VITALS
SYSTOLIC BLOOD PRESSURE: 113 MMHG | TEMPERATURE: 97.6 F | OXYGEN SATURATION: 98 % | WEIGHT: 231 LBS | BODY MASS INDEX: 36.18 KG/M2 | HEART RATE: 88 BPM | DIASTOLIC BLOOD PRESSURE: 76 MMHG

## 2022-07-05 DIAGNOSIS — Z47.89 ORTHOPEDIC AFTERCARE: ICD-10-CM

## 2022-07-05 DIAGNOSIS — Z96.641 S/P HIP REPLACEMENT, RIGHT: Primary | ICD-10-CM

## 2022-07-05 PROCEDURE — 73502 X-RAY EXAM HIP UNI 2-3 VIEWS: CPT

## 2022-07-05 PROCEDURE — 99213 OFFICE O/P EST LOW 20 MIN: CPT | Performed by: ORTHOPAEDIC SURGERY

## 2022-07-05 NOTE — PROGRESS NOTES
7/5/2022    Chief Complaint: Follow up for right hip replacement    HPI:  is a 64 y.o.  who is now one year status post right hip  arthroplasty. The patient states that they are doing well. The preoperative pain is gone and the postoperative pain is gone. Regular exercises have helped with residual symptoms. Past Medical History:   Diagnosis Date    Arthritis     Cardiomyopathy [facility])     cardiologist-Dr. Beth Doyle     Heart failure ([facility] [address]     acute systolic heart failure    Thyroid disease        Past Surgical History:   Procedure Laterality Date    HX APPENDECTOMY      HX GYN      dysplasia    HX HIP REPLACEMENT      SC EPHYS EVAL PACG CVDFB PRGRMG/REPRGRMG PARAMETERS N/A 10/14/2020    Eval Icd Generator & Leads W Testing At Implant performed by Kyung Salcido MD at [address] CATH LAB    SC INSJ/RPLCMT PERM DFB W/TRNSVNS LDS 1/DUAL CHMBR N/A 10/14/2020    Insert Icd Single performed by Kyung Salcido MD at [address] CATH LAB       Current Outpatient Medications on File Prior to Visit   Medication Sig Dispense Refill    LevoxyL 137 mcg tablet       meloxicam (MOBIC) 15 mg tablet Take 1 Tab by mouth daily. 60 Tab 1    senna-docusate (PERICOLACE) 8.6-50 mg per tablet Take 1 Tab by mouth daily. 30 Tab 0    doxycycline (MONODOX) 100 mg capsule Take 200 mg by mouth two (2) times a day.  B.infantis-B.ani-B.long-B.bifi (Probiotic 4X) 10-15 mg TbEC Take 10-15 mg by mouth daily.  omega 3-dha-epa-fish oil (Fish Oil) 100-160-1,000 mg cap Take 1,000 mg by mouth daily. take 1 tablet daily      calcium carbonate (CALCIUM 600 PO) Take 1,000 mg by mouth daily.  fexofenadine-pseudoephedrine (Allegra-D 12 Hour)  mg Tb12 Take 1 Tab by mouth every twelve (12) hours.       OTHER Occuvite   Ruten-gums      furosemide (LASIX) 40 mg tablet TAKE 1 TABLET BY MOUTH EVERY DAY      metoprolol succinate (TOPROL-XL) 25 mg XL tablet TAKE 1 TABLET BY MOUTH EVERY DAY      Entresto 24-26 mg tablet TAKE 1 TABLET BY MOUTH TWICE A DAY       No current facility-administered medications on file prior to visit. No Known Allergies    Family History   Problem Relation Age of Onset    Heart Disease Mother     Hypertension Mother     Diabetes Brother     Hypertension Brother        Social History     Socioeconomic History    Marital status: SINGLE   Tobacco Use    Smoking status: Former Smoker     Packs/day: 0.50     Years: 30.00     Pack years: 15.00     Quit date: 2019     Years since quitting: 3.5    Smokeless tobacco: Never Used   Substance and Sexual Activity    Alcohol use: Yes     Alcohol/week: 1.0 standard drink     Types: 1 Glasses of wine per week     Comment: weekends    Drug use: Yes     Types: Prescription, OTC    Sexual activity: Yes         Review of Systems:       General: Denies headache, lethargy, fever, weight loss  Ears/Nose/Throat: Denies ear discharge, drainage, nosebleeds, hoarse voice, dental problems  Cardiovascular: Denies chest pain, shortness of breath  Lungs: Denies chest pain, breathing problems, wheezing, pneumonia  Stomach: Denies stomach pain, heartburn, constipation, irritable bowel  Skin: Denies rash, sores, open wounds  Musculoskeletal: no issues  Genitourinary: Denies dysuria, hematuria, polyuria  Gastrointestinal: Denies constipation, obstipation, diarrhea  Neurological: Denies changes in sight, smell, hearing, taste, seizures. Denies loss of consciousness.   Psychiatric: Denies depression, sleep pattern changes, anxiety, change in personality  Endocrine: Denies mood swings, heat or cold intolerance  Hematologic/Lymphatic: Denies anemia, purpura, petechia  Allergic/Immunologic: Denies swelling of throat, pain or swelling at lymph nodes      Physical Examination:    Visit Vitals  /76   Pulse 88   Temp 97.6 °F (36.4 °C)   Wt 231 lb (104.8 kg)   SpO2 98%   BMI 36.18 kg/m²        General: AOX3, no apparent distress  Psychiatric: mood and affect appropriate  Lungs: breathing is symmetric and unlabored bilaterally  Heart: regular rate and rhythm  Abdomen: no guarding  Head: normocephalic, atraumatic  Skin: No significant abnormalities, good turgor  Sensation intact to light touch: L1-S1 dermatomes  Muscular exam: 5/5 strength in all major muscle groups unless noted in specialty exam.    Extremities:      Left upper extremity: Full active and passive range of motion without pain, deformity, no open wound, strength 5/5 in all major muscle groups. Right upper extremity: Full active and passive range of motion without pain, deformity, no open wound, strength 5/5 in all major muscle groups. Left lower extremity: Full active and passive range of motion without pain, deformity, no open wound, strength 5/5 in all major muscle groups. Right lower extremity:  Well healed surgical wound is noted. Patient ambulates with no device and no limp. No deformity is noted. Circumduction of the hip does not cause arthritic pain as it had preoperatively. Strength testing is indicative of 5/5 strength at hip flexion, extension,  5/5 knee flexion and extension, tibialis anterior, EHL, and FHL. Sensation is intact to light touch in the L1-S1 dermatomes with lateral femoral cutaneous nerve function intact. Capillary refill is less than 2 seconds distally. Diagnostics:    Pertinent Xrays:  Xrays are ordered and reviewed by myself,  available of the right hip, they indicate a total hip arthroplasty in excellent position without evidence of loosening or failure. Leg lengths are equal.        Assessment: Aftercare, status post right hip arthroplasty    Plan:   will continue physical therapy exercises. We discussed the importance of continued vigilance to this end. We also discussed dental prophylaxis and safe activities and reasonable life choices regarding activity level. Patient stated their understanding.   Pain control for now will be as needed only, and will be patient directed.  will follow up in 2 years. Right hip x-rays on follow up. Ms. Justine Avery has a reminder for a \"due or due soon\" health maintenance. I have asked that she contact her primary care provider for follow-up on this health maintenance.

## 2022-08-31 ENCOUNTER — TELEPHONE (OUTPATIENT)
Dept: ORTHOPEDIC SURGERY | Age: 62
End: 2022-08-31

## 2022-08-31 DIAGNOSIS — Z47.89 ORTHOPEDIC AFTERCARE: Primary | ICD-10-CM

## 2022-08-31 RX ORDER — AMOXICILLIN AND CLAVULANATE POTASSIUM 500; 125 MG/1; MG/1
4 TABLET, FILM COATED ORAL ONCE
Qty: 4 TABLET | Refills: 1 | Status: SHIPPED | OUTPATIENT
Start: 2022-08-31 | End: 2022-08-31

## 2022-08-31 NOTE — TELEPHONE ENCOUNTER
Patient is requesting antibiotics for her upcoming dental procedure on 9/01/22.     She would like the prescription to be sent to the [facility]

## 2023-09-19 ENCOUNTER — TELEPHONE (OUTPATIENT)
Age: 63
End: 2023-09-19

## 2023-09-19 RX ORDER — AMOXICILLIN 500 MG/1
CAPSULE ORAL
Qty: 4 CAPSULE | Refills: 2 | Status: SHIPPED | OUTPATIENT
Start: 2023-09-19

## (undated) DEVICE — DERMABOND SKIN ADH 0.7ML -- DERMABOND ADVANCED 12/BX

## (undated) DEVICE — SUTURE V-LOC 90 3-0 L9IN ABSRB VLT L26MM V-20 1/2 CIR TAPR VLOCM0644

## (undated) DEVICE — SHEET, DRAPE, SPLIT, STERILE: Brand: MEDLINE

## (undated) DEVICE — BLADE ELECTRODE: Brand: EDGE

## (undated) DEVICE — INTRO SHTH 9FR 13X20CM -- USE ITEM# 341577

## (undated) DEVICE — SUTURE VCRL SZ 1 L36IN ABSRB UD L36MM CT-1 1/2 CIR J947H

## (undated) DEVICE — STRIP,CLOSURE,WOUND,MEDI-STRIP,1/2X4: Brand: MEDLINE

## (undated) DEVICE — 3M™ IOBAN™ 2 ANTIMICROBIAL INCISE DRAPE 6650EZ: Brand: IOBAN™ 2

## (undated) DEVICE — 4-PORT MANIFOLD: Brand: NEPTUNE 2

## (undated) DEVICE — SMOKE EVACUATION PENCIL: Brand: VALLEYLAB

## (undated) DEVICE — SUT SLK 0 30IN CT1 BLK --

## (undated) DEVICE — HOOD: Brand: FLYTE

## (undated) DEVICE — PLASMABLADE PS210-030S 3.0S LOCK: Brand: PLASMABLADE™

## (undated) DEVICE — GARMENT,MEDLINE,DVT,INT,CALF,MED, GEN2: Brand: MEDLINE

## (undated) DEVICE — STERILE POLYISOPRENE POWDER-FREE SURGICAL GLOVES: Brand: PROTEXIS

## (undated) DEVICE — PACEMAKER PACK: Brand: MEDLINE INDUSTRIES, INC.

## (undated) DEVICE — BRUSH SCRB PCMX NL CLN 12ML --

## (undated) DEVICE — HANDLE LT SNAP ON ULT DURABLE LENS FOR TRUMPF ALC DISPOSABLE

## (undated) DEVICE — SUTURE MCRYL SZ 3-0 L27IN ABSRB UD L24MM PS-1 3/8 CIR PRIM Y936H

## (undated) DEVICE — REM POLYHESIVE ADULT PATIENT RETURN ELECTRODE: Brand: VALLEYLAB

## (undated) DEVICE — 3M™ TEGADERM™ TRANSPARENT FILM DRESSING FRAME STYLE, 1627, 4 IN X 10 IN (10 CM X 25 CM), 20/CT 4CT/CASE: Brand: 3M™ TEGADERM™

## (undated) DEVICE — SUTURE VCRL SZ 2-0 L36IN ABSRB UD L36MM CT-1 1/2 CIR J945H

## (undated) DEVICE — PREP SKN PREVAIL 40ML APPL --

## (undated) DEVICE — CONTAINER,SPECIMEN,3OZ,OR STRL: Brand: MEDLINE

## (undated) DEVICE — STERILE POLYISOPRENE POWDER-FREE SURGICAL GLOVES WITH EMOLLIENT COATING: Brand: PROTEXIS

## (undated) DEVICE — SOLUTION IV 1000ML 0.9% SOD CHL

## (undated) DEVICE — Device: Brand: PADPRO

## (undated) DEVICE — 3M™ IOBAN™ 2 ANTIMICROBIAL INCISE DRAPE 6651EZ: Brand: IOBAN™ 2

## (undated) DEVICE — STRYKER PERFORMANCE SERIES SAGITTAL BLADE: Brand: STRYKER PERFORMANCE SERIES

## (undated) DEVICE — LABEL MED MRMC ORTH STRL

## (undated) DEVICE — DRAPE,U/ SHT,SPLIT,PLAS,STERIL: Brand: MEDLINE

## (undated) DEVICE — INFECTION CONTROL KIT SYS

## (undated) DEVICE — KIT POS FOAM HANA TBL

## (undated) DEVICE — SOLUTION IRRIG 1000ML H2O STRL BLT

## (undated) DEVICE — DRAPE XR C ARM 41X74IN LF --

## (undated) DEVICE — SYR 50ML LR LCK 1ML GRAD NSAF --

## (undated) DEVICE — Device

## (undated) DEVICE — DRESSING FOAM 4X6 DISP POSTOP MEPILEX BORD AG

## (undated) DEVICE — SUTURE VCRL SZ 3-0 L27IN ABSRB VLT L26MM SH 1/2 CIR J316H

## (undated) DEVICE — SUTURE PERMAHAND SZ 0 L18IN NONABSORBABLE BLK SILK BRAID A186H

## (undated) DEVICE — DEVICE TRNSF SPIK STL 2008S] MICROTEK MEDICAL INC]

## (undated) DEVICE — NEEDLE HYPO 18GA L1.5IN PNK S STL HUB POLYPR SHLD REG BVL

## (undated) DEVICE — 450 ML BOTTLE OF 0.05% CHLORHEXIDINE GLUCONATE IN 99.95% STERILE WATER FOR IRRIGATION, USP AND APPLICATOR.: Brand: IRRISEPT ANTIMICROBIAL WOUND LAVAGE